# Patient Record
Sex: FEMALE | Race: OTHER | HISPANIC OR LATINO | URBAN - METROPOLITAN AREA
[De-identification: names, ages, dates, MRNs, and addresses within clinical notes are randomized per-mention and may not be internally consistent; named-entity substitution may affect disease eponyms.]

---

## 2017-06-01 ENCOUNTER — EMERGENCY (EMERGENCY)
Facility: HOSPITAL | Age: 82
LOS: 1 days | Discharge: HOME | End: 2017-06-01
Attending: EMERGENCY MEDICINE
Payer: MEDICARE

## 2017-06-01 VITALS
HEART RATE: 70 BPM | TEMPERATURE: 99 F | OXYGEN SATURATION: 98 % | WEIGHT: 130.07 LBS | DIASTOLIC BLOOD PRESSURE: 55 MMHG | SYSTOLIC BLOOD PRESSURE: 128 MMHG | HEIGHT: 61 IN | RESPIRATION RATE: 16 BRPM

## 2017-06-01 DIAGNOSIS — Z98.890 OTHER SPECIFIED POSTPROCEDURAL STATES: Chronic | ICD-10-CM

## 2017-06-01 LAB
ALBUMIN SERPL ELPH-MCNC: 3.7 G/DL — SIGNIFICANT CHANGE UP (ref 3.5–5)
ALP SERPL-CCNC: 62 U/L — SIGNIFICANT CHANGE UP (ref 40–120)
ALT FLD-CCNC: 32 U/L DA — SIGNIFICANT CHANGE UP (ref 10–60)
ANION GAP SERPL CALC-SCNC: 3 MMOL/L — LOW (ref 5–17)
APPEARANCE UR: CLEAR — SIGNIFICANT CHANGE UP
AST SERPL-CCNC: 25 U/L — SIGNIFICANT CHANGE UP (ref 10–40)
BASOPHILS # BLD AUTO: 0.1 K/UL — SIGNIFICANT CHANGE UP (ref 0–0.2)
BASOPHILS NFR BLD AUTO: 2 % — SIGNIFICANT CHANGE UP (ref 0–2)
BILIRUB SERPL-MCNC: 1 MG/DL — SIGNIFICANT CHANGE UP (ref 0.2–1.2)
BILIRUB UR-MCNC: NEGATIVE — SIGNIFICANT CHANGE UP
BUN SERPL-MCNC: 9 MG/DL — SIGNIFICANT CHANGE UP (ref 7–18)
CALCIUM SERPL-MCNC: 9.5 MG/DL — SIGNIFICANT CHANGE UP (ref 8.4–10.5)
CHLORIDE SERPL-SCNC: 105 MMOL/L — SIGNIFICANT CHANGE UP (ref 96–108)
CO2 SERPL-SCNC: 35 MMOL/L — HIGH (ref 22–31)
COLOR SPEC: YELLOW — SIGNIFICANT CHANGE UP
CREAT SERPL-MCNC: 0.69 MG/DL — SIGNIFICANT CHANGE UP (ref 0.5–1.3)
DIFF PNL FLD: ABNORMAL
EOSINOPHIL # BLD AUTO: 0.2 K/UL — SIGNIFICANT CHANGE UP (ref 0–0.5)
EOSINOPHIL NFR BLD AUTO: 3.2 % — SIGNIFICANT CHANGE UP (ref 0–6)
GLUCOSE SERPL-MCNC: 80 MG/DL — SIGNIFICANT CHANGE UP (ref 70–99)
GLUCOSE UR QL: NEGATIVE — SIGNIFICANT CHANGE UP
HCT VFR BLD CALC: 41.7 % — SIGNIFICANT CHANGE UP (ref 34.5–45)
HGB BLD-MCNC: 12.9 G/DL — SIGNIFICANT CHANGE UP (ref 11.5–15.5)
KETONES UR-MCNC: NEGATIVE — SIGNIFICANT CHANGE UP
LEUKOCYTE ESTERASE UR-ACNC: NEGATIVE — SIGNIFICANT CHANGE UP
LIDOCAIN IGE QN: 188 U/L — SIGNIFICANT CHANGE UP (ref 73–393)
LYMPHOCYTES # BLD AUTO: 2.6 K/UL — SIGNIFICANT CHANGE UP (ref 1–3.3)
LYMPHOCYTES # BLD AUTO: 43.5 % — SIGNIFICANT CHANGE UP (ref 13–44)
MCHC RBC-ENTMCNC: 26.8 PG — LOW (ref 27–34)
MCHC RBC-ENTMCNC: 31 GM/DL — LOW (ref 32–36)
MCV RBC AUTO: 86.4 FL — SIGNIFICANT CHANGE UP (ref 80–100)
MONOCYTES # BLD AUTO: 0.5 K/UL — SIGNIFICANT CHANGE UP (ref 0–0.9)
MONOCYTES NFR BLD AUTO: 8.4 % — SIGNIFICANT CHANGE UP (ref 2–14)
NEUTROPHILS # BLD AUTO: 2.5 K/UL — SIGNIFICANT CHANGE UP (ref 1.8–7.4)
NEUTROPHILS NFR BLD AUTO: 42.9 % — LOW (ref 43–77)
NITRITE UR-MCNC: NEGATIVE — SIGNIFICANT CHANGE UP
PH UR: 7 — SIGNIFICANT CHANGE UP (ref 5–8)
PLATELET # BLD AUTO: 239 K/UL — SIGNIFICANT CHANGE UP (ref 150–400)
POTASSIUM SERPL-MCNC: 4.5 MMOL/L — SIGNIFICANT CHANGE UP (ref 3.5–5.3)
POTASSIUM SERPL-SCNC: 4.5 MMOL/L — SIGNIFICANT CHANGE UP (ref 3.5–5.3)
PROT SERPL-MCNC: 8.2 G/DL — SIGNIFICANT CHANGE UP (ref 6–8.3)
PROT UR-MCNC: NEGATIVE — SIGNIFICANT CHANGE UP
RBC # BLD: 4.83 M/UL — SIGNIFICANT CHANGE UP (ref 3.8–5.2)
RBC # FLD: 13.6 % — SIGNIFICANT CHANGE UP (ref 10.3–14.5)
SODIUM SERPL-SCNC: 143 MMOL/L — SIGNIFICANT CHANGE UP (ref 135–145)
SP GR SPEC: 1 — LOW (ref 1.01–1.02)
UROBILINOGEN FLD QL: NEGATIVE — SIGNIFICANT CHANGE UP
WBC # BLD: 5.9 K/UL — SIGNIFICANT CHANGE UP (ref 3.8–10.5)
WBC # FLD AUTO: 5.9 K/UL — SIGNIFICANT CHANGE UP (ref 3.8–10.5)

## 2017-06-01 PROCEDURE — 87186 SC STD MICRODIL/AGAR DIL: CPT

## 2017-06-01 PROCEDURE — 80053 COMPREHEN METABOLIC PANEL: CPT

## 2017-06-01 PROCEDURE — 74177 CT ABD & PELVIS W/CONTRAST: CPT | Mod: 26

## 2017-06-01 PROCEDURE — 83690 ASSAY OF LIPASE: CPT

## 2017-06-01 PROCEDURE — 85027 COMPLETE CBC AUTOMATED: CPT

## 2017-06-01 PROCEDURE — 87086 URINE CULTURE/COLONY COUNT: CPT

## 2017-06-01 PROCEDURE — 74177 CT ABD & PELVIS W/CONTRAST: CPT

## 2017-06-01 PROCEDURE — 99285 EMERGENCY DEPT VISIT HI MDM: CPT

## 2017-06-01 PROCEDURE — 99284 EMERGENCY DEPT VISIT MOD MDM: CPT | Mod: 25

## 2017-06-01 PROCEDURE — 81001 URINALYSIS AUTO W/SCOPE: CPT

## 2017-06-01 RX ORDER — SODIUM CHLORIDE 9 MG/ML
1000 INJECTION INTRAMUSCULAR; INTRAVENOUS; SUBCUTANEOUS ONCE
Qty: 0 | Refills: 0 | Status: COMPLETED | OUTPATIENT
Start: 2017-06-01 | End: 2017-06-01

## 2017-06-01 RX ADMIN — SODIUM CHLORIDE 1000 MILLILITER(S): 9 INJECTION INTRAMUSCULAR; INTRAVENOUS; SUBCUTANEOUS at 20:26

## 2017-06-01 NOTE — ED ADULT TRIAGE NOTE - CHIEF COMPLAINT QUOTE
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 Surgical eval Dr Mcbride/ [place] per paperwork with pt.

## 2017-06-01 NOTE — ED PROVIDER NOTE - CHPI ED SYMPTOMS NEG
no diarrhea/no chills/no nausea/no fever/no vomiting/no SOB, no cough, no palpitations, no chest pain

## 2017-06-01 NOTE — ED PROVIDER NOTE - OBJECTIVE STATEMENT
81 y/o F pt with no significant PMHx, and PSHx of H/O hernia repair (12/2016), presents to ED c/o diffuse abd pain, vaginal spotting and dysuria x yesterday. Pt notes abd pain has resolved today. Pt reports visiting the OBGYN to day and was referred to the ED for further evaluation. Pt denies fever, chills, nausea, vomiting, diarrhea, SOB, cough, CP, palpitations, or any other complaints. NKDA. Nai (daughter)at bedside translating for pt in Divehi. 81 y/o F pt with no significant PMHx, and PSHx of H/O hernia repair (12/2016), presents to ED c/o diffuse abd pain, vaginal spotting and dysuria x yesterday. Pt notes abd pain has resolved today. Pt reports visiting the OBGYN to day and was referred to the ED for further evaluation. Pt denies fever, chills, nausea, vomiting, diarrhea, SOB, cough, CP, palpitations, or any other complaints. NKDA. Nai (daughter)at bedside translating for pt in Greenlandic, offered translational services but pt declined. 83 y/o F pt with no significant PMHx, and PSHx of H/O hernia repair (12/2016), presents to ED c/o diffuse abd pain, vaginal spotting and dysuria x yesterday. Pt notes abd pain has resolved today. Pt reports visiting the OBGYN today and was referred to the ED for further evaluation. Pt denies fever, chills, nausea, vomiting, diarrhea, SOB, cough, CP, palpitations, or any other complaints. NKDA.

## 2017-06-01 NOTE — ED ADULT NURSE NOTE - OBJECTIVE STATEMENT
Patient complains of abdominal pain, dysuria, and vaginal spotting x yesterday. Denies fever, chills, nausea, vomiting, shortness of breath.

## 2017-06-01 NOTE — ED ADULT NURSE REASSESSMENT NOTE - NS ED NURSE REASSESS COMMENT FT1
CT scan completed. Patient remains alert, responsive, resting in stretcher, moving all extremities. Family at bedside.

## 2017-06-01 NOTE — ED ADULT NURSE NOTE - ED STAT RN HANDOFF DETAILS
Patient endorsed to RN Tova, alert, responsive, resting in stretcher. Awaiting inpatient bed. Patient endorsed to RN Tova, alert, responsive, resting in stretcher. Awaiting disposition.

## 2017-06-01 NOTE — ED PROVIDER NOTE - PROGRESS NOTE DETAILS
CT negative for any acute sx proscess. Discussed w Kimberly VILLALOBOS PA, pt can return to Dr. Quiroz for TV/pelvic sono

## 2017-06-01 NOTE — ED PROVIDER NOTE - MEDICAL DECISION MAKING DETAILS
82 F pt with abd pain and vaginal spotting. Will do labs, UA, CT abd/pelvis, and pain control (pt declined).

## 2017-06-01 NOTE — ED PROVIDER NOTE - SKIN, MLM
Skin normal color for race, warm, dry and intact. 10 x 4 cm well healing scar on the medial distal leg from skin grafting

## 2017-06-03 DIAGNOSIS — R10.9 UNSPECIFIED ABDOMINAL PAIN: ICD-10-CM

## 2019-08-28 ENCOUNTER — INPATIENT (INPATIENT)
Facility: HOSPITAL | Age: 84
LOS: 1 days | Discharge: HOME | DRG: 871 | End: 2019-08-30
Attending: INTERNAL MEDICINE | Admitting: INTERNAL MEDICINE
Payer: MEDICARE

## 2019-08-28 VITALS
HEART RATE: 89 BPM | SYSTOLIC BLOOD PRESSURE: 144 MMHG | DIASTOLIC BLOOD PRESSURE: 67 MMHG | RESPIRATION RATE: 22 BRPM | HEIGHT: 60 IN | WEIGHT: 126.99 LBS | OXYGEN SATURATION: 93 % | TEMPERATURE: 103 F

## 2019-08-28 DIAGNOSIS — Z98.890 OTHER SPECIFIED POSTPROCEDURAL STATES: Chronic | ICD-10-CM

## 2019-08-28 DIAGNOSIS — R10.13 EPIGASTRIC PAIN: ICD-10-CM

## 2019-08-28 DIAGNOSIS — J18.9 PNEUMONIA, UNSPECIFIED ORGANISM: ICD-10-CM

## 2019-08-28 DIAGNOSIS — Z29.9 ENCOUNTER FOR PROPHYLACTIC MEASURES, UNSPECIFIED: ICD-10-CM

## 2019-08-28 DIAGNOSIS — R74.8 ABNORMAL LEVELS OF OTHER SERUM ENZYMES: ICD-10-CM

## 2019-08-28 DIAGNOSIS — A41.9 SEPSIS, UNSPECIFIED ORGANISM: ICD-10-CM

## 2019-08-28 LAB
24R-OH-CALCIDIOL SERPL-MCNC: 26.6 NG/ML — LOW (ref 30–80)
ALBUMIN SERPL ELPH-MCNC: 2.8 G/DL — LOW (ref 3.5–5)
ALBUMIN SERPL ELPH-MCNC: 3.6 G/DL — SIGNIFICANT CHANGE UP (ref 3.5–5)
ALP SERPL-CCNC: 56 U/L — SIGNIFICANT CHANGE UP (ref 40–120)
ALP SERPL-CCNC: 67 U/L — SIGNIFICANT CHANGE UP (ref 40–120)
ALT FLD-CCNC: 27 U/L DA — SIGNIFICANT CHANGE UP (ref 10–60)
ALT FLD-CCNC: 30 U/L DA — SIGNIFICANT CHANGE UP (ref 10–60)
ANION GAP SERPL CALC-SCNC: 4 MMOL/L — LOW (ref 5–17)
ANION GAP SERPL CALC-SCNC: 6 MMOL/L — SIGNIFICANT CHANGE UP (ref 5–17)
APPEARANCE UR: CLEAR — SIGNIFICANT CHANGE UP
APTT BLD: 25.2 SEC — LOW (ref 27.5–36.3)
AST SERPL-CCNC: 23 U/L — SIGNIFICANT CHANGE UP (ref 10–40)
AST SERPL-CCNC: 37 U/L — SIGNIFICANT CHANGE UP (ref 10–40)
BASOPHILS # BLD AUTO: 0.02 K/UL — SIGNIFICANT CHANGE UP (ref 0–0.2)
BASOPHILS NFR BLD AUTO: 0.3 % — SIGNIFICANT CHANGE UP (ref 0–2)
BILIRUB SERPL-MCNC: 1.6 MG/DL — HIGH (ref 0.2–1.2)
BILIRUB SERPL-MCNC: 2 MG/DL — HIGH (ref 0.2–1.2)
BILIRUB UR-MCNC: NEGATIVE — SIGNIFICANT CHANGE UP
BUN SERPL-MCNC: 14 MG/DL — SIGNIFICANT CHANGE UP (ref 7–18)
BUN SERPL-MCNC: 9 MG/DL — SIGNIFICANT CHANGE UP (ref 7–18)
CALCIUM SERPL-MCNC: 7.5 MG/DL — LOW (ref 8.4–10.5)
CALCIUM SERPL-MCNC: 8.6 MG/DL — SIGNIFICANT CHANGE UP (ref 8.4–10.5)
CHLORIDE SERPL-SCNC: 102 MMOL/L — SIGNIFICANT CHANGE UP (ref 96–108)
CHLORIDE SERPL-SCNC: 109 MMOL/L — HIGH (ref 96–108)
CHOLEST SERPL-MCNC: 124 MG/DL — SIGNIFICANT CHANGE UP (ref 10–199)
CK MB BLD-MCNC: 3.6 % — HIGH (ref 0–3.5)
CK MB BLD-MCNC: 4.3 % — HIGH (ref 0–3.5)
CK MB CFR SERPL CALC: 7.1 NG/ML — HIGH (ref 0–3.6)
CK MB CFR SERPL CALC: 9.3 NG/ML — HIGH (ref 0–3.6)
CK SERPL-CCNC: 196 U/L — SIGNIFICANT CHANGE UP (ref 21–215)
CK SERPL-CCNC: 217 U/L — HIGH (ref 21–215)
CO2 SERPL-SCNC: 27 MMOL/L — SIGNIFICANT CHANGE UP (ref 22–31)
CO2 SERPL-SCNC: 30 MMOL/L — SIGNIFICANT CHANGE UP (ref 22–31)
COLOR SPEC: YELLOW — SIGNIFICANT CHANGE UP
CREAT SERPL-MCNC: 0.68 MG/DL — SIGNIFICANT CHANGE UP (ref 0.5–1.3)
CREAT SERPL-MCNC: 0.83 MG/DL — SIGNIFICANT CHANGE UP (ref 0.5–1.3)
DIFF PNL FLD: ABNORMAL
EOSINOPHIL # BLD AUTO: 0.04 K/UL — SIGNIFICANT CHANGE UP (ref 0–0.5)
EOSINOPHIL NFR BLD AUTO: 0.6 % — SIGNIFICANT CHANGE UP (ref 0–6)
GLUCOSE SERPL-MCNC: 110 MG/DL — HIGH (ref 70–99)
GLUCOSE SERPL-MCNC: 125 MG/DL — HIGH (ref 70–99)
GLUCOSE UR QL: NEGATIVE — SIGNIFICANT CHANGE UP
HBA1C BLD-MCNC: 5.7 % — HIGH (ref 4–5.6)
HCT VFR BLD CALC: 33.9 % — LOW (ref 34.5–45)
HCT VFR BLD CALC: 38.4 % — SIGNIFICANT CHANGE UP (ref 34.5–45)
HDLC SERPL-MCNC: 66 MG/DL — SIGNIFICANT CHANGE UP
HGB BLD-MCNC: 10.7 G/DL — LOW (ref 11.5–15.5)
HGB BLD-MCNC: 12.2 G/DL — SIGNIFICANT CHANGE UP (ref 11.5–15.5)
IMM GRANULOCYTES NFR BLD AUTO: 0.3 % — SIGNIFICANT CHANGE UP (ref 0–1.5)
INR BLD: 1.37 RATIO — HIGH (ref 0.88–1.16)
KETONES UR-MCNC: NEGATIVE — SIGNIFICANT CHANGE UP
LACTATE SERPL-SCNC: 1.1 MMOL/L — SIGNIFICANT CHANGE UP (ref 0.7–2)
LEUKOCYTE ESTERASE UR-ACNC: ABNORMAL
LIPID PNL WITH DIRECT LDL SERPL: 50 MG/DL — SIGNIFICANT CHANGE UP
LYMPHOCYTES # BLD AUTO: 0.63 K/UL — LOW (ref 1–3.3)
LYMPHOCYTES # BLD AUTO: 10.2 % — LOW (ref 13–44)
MAGNESIUM SERPL-MCNC: 1.9 MG/DL — SIGNIFICANT CHANGE UP (ref 1.6–2.6)
MCHC RBC-ENTMCNC: 26.8 PG — LOW (ref 27–34)
MCHC RBC-ENTMCNC: 26.9 PG — LOW (ref 27–34)
MCHC RBC-ENTMCNC: 31.6 GM/DL — LOW (ref 32–36)
MCHC RBC-ENTMCNC: 31.8 GM/DL — LOW (ref 32–36)
MCV RBC AUTO: 84.8 FL — SIGNIFICANT CHANGE UP (ref 80–100)
MCV RBC AUTO: 84.8 FL — SIGNIFICANT CHANGE UP (ref 80–100)
MONOCYTES # BLD AUTO: 0.04 K/UL — SIGNIFICANT CHANGE UP (ref 0–0.9)
MONOCYTES NFR BLD AUTO: 0.6 % — LOW (ref 2–14)
NEUTROPHILS # BLD AUTO: 5.42 K/UL — SIGNIFICANT CHANGE UP (ref 1.8–7.4)
NEUTROPHILS NFR BLD AUTO: 88 % — HIGH (ref 43–77)
NITRITE UR-MCNC: NEGATIVE — SIGNIFICANT CHANGE UP
NRBC # BLD: 0 /100 WBCS — SIGNIFICANT CHANGE UP (ref 0–0)
NRBC # BLD: 0 /100 WBCS — SIGNIFICANT CHANGE UP (ref 0–0)
NT-PROBNP SERPL-SCNC: 88 PG/ML — SIGNIFICANT CHANGE UP (ref 0–450)
PH UR: 7 — SIGNIFICANT CHANGE UP (ref 5–8)
PHOSPHATE SERPL-MCNC: 3 MG/DL — SIGNIFICANT CHANGE UP (ref 2.5–4.5)
PLATELET # BLD AUTO: 164 K/UL — SIGNIFICANT CHANGE UP (ref 150–400)
PLATELET # BLD AUTO: 193 K/UL — SIGNIFICANT CHANGE UP (ref 150–400)
POTASSIUM SERPL-MCNC: 3.6 MMOL/L — SIGNIFICANT CHANGE UP (ref 3.5–5.3)
POTASSIUM SERPL-MCNC: 3.8 MMOL/L — SIGNIFICANT CHANGE UP (ref 3.5–5.3)
POTASSIUM SERPL-SCNC: 3.6 MMOL/L — SIGNIFICANT CHANGE UP (ref 3.5–5.3)
POTASSIUM SERPL-SCNC: 3.8 MMOL/L — SIGNIFICANT CHANGE UP (ref 3.5–5.3)
PROT SERPL-MCNC: 6.3 G/DL — SIGNIFICANT CHANGE UP (ref 6–8.3)
PROT SERPL-MCNC: 7.8 G/DL — SIGNIFICANT CHANGE UP (ref 6–8.3)
PROT UR-MCNC: 30 MG/DL
PROTHROM AB SERPL-ACNC: 15.3 SEC — HIGH (ref 10–12.9)
RBC # BLD: 4 M/UL — SIGNIFICANT CHANGE UP (ref 3.8–5.2)
RBC # BLD: 4.53 M/UL — SIGNIFICANT CHANGE UP (ref 3.8–5.2)
RBC # FLD: 14.7 % — HIGH (ref 10.3–14.5)
RBC # FLD: 15 % — HIGH (ref 10.3–14.5)
SODIUM SERPL-SCNC: 138 MMOL/L — SIGNIFICANT CHANGE UP (ref 135–145)
SODIUM SERPL-SCNC: 140 MMOL/L — SIGNIFICANT CHANGE UP (ref 135–145)
SP GR SPEC: 1 — LOW (ref 1.01–1.02)
TOTAL CHOLESTEROL/HDL RATIO MEASUREMENT: 1.9 RATIO — LOW (ref 3.3–7.1)
TRIGL SERPL-MCNC: 42 MG/DL — SIGNIFICANT CHANGE UP (ref 10–149)
TROPONIN I SERPL-MCNC: 0.13 NG/ML — HIGH (ref 0–0.04)
TROPONIN I SERPL-MCNC: 4.18 NG/ML — HIGH (ref 0–0.04)
TROPONIN I SERPL-MCNC: 4.53 NG/ML — HIGH (ref 0–0.04)
TROPONIN I SERPL-MCNC: 5.89 NG/ML — HIGH (ref 0–0.04)
TSH SERPL-MCNC: 0.31 UU/ML — LOW (ref 0.34–4.82)
UROBILINOGEN FLD QL: NEGATIVE — SIGNIFICANT CHANGE UP
VIT B12 SERPL-MCNC: 421 PG/ML — SIGNIFICANT CHANGE UP (ref 232–1245)
WBC # BLD: 6.17 K/UL — SIGNIFICANT CHANGE UP (ref 3.8–10.5)
WBC # BLD: 7.24 K/UL — SIGNIFICANT CHANGE UP (ref 3.8–10.5)
WBC # FLD AUTO: 6.17 K/UL — SIGNIFICANT CHANGE UP (ref 3.8–10.5)
WBC # FLD AUTO: 7.24 K/UL — SIGNIFICANT CHANGE UP (ref 3.8–10.5)

## 2019-08-28 PROCEDURE — 93010 ELECTROCARDIOGRAM REPORT: CPT | Mod: 76

## 2019-08-28 PROCEDURE — 99285 EMERGENCY DEPT VISIT HI MDM: CPT

## 2019-08-28 PROCEDURE — 71275 CT ANGIOGRAPHY CHEST: CPT | Mod: 26

## 2019-08-28 PROCEDURE — 99223 1ST HOSP IP/OBS HIGH 75: CPT

## 2019-08-28 PROCEDURE — 71045 X-RAY EXAM CHEST 1 VIEW: CPT | Mod: 26

## 2019-08-28 PROCEDURE — 99232 SBSQ HOSP IP/OBS MODERATE 35: CPT

## 2019-08-28 RX ORDER — ASPIRIN/CALCIUM CARB/MAGNESIUM 324 MG
81 TABLET ORAL DAILY
Refills: 0 | Status: DISCONTINUED | OUTPATIENT
Start: 2019-08-28 | End: 2019-08-30

## 2019-08-28 RX ORDER — AZITHROMYCIN 500 MG/1
500 TABLET, FILM COATED ORAL EVERY 24 HOURS
Refills: 0 | Status: DISCONTINUED | OUTPATIENT
Start: 2019-08-29 | End: 2019-08-30

## 2019-08-28 RX ORDER — SODIUM CHLORIDE 9 MG/ML
1000 INJECTION INTRAMUSCULAR; INTRAVENOUS; SUBCUTANEOUS ONCE
Refills: 0 | Status: COMPLETED | OUTPATIENT
Start: 2019-08-28 | End: 2019-08-28

## 2019-08-28 RX ORDER — SODIUM CHLORIDE 9 MG/ML
1000 INJECTION INTRAMUSCULAR; INTRAVENOUS; SUBCUTANEOUS
Refills: 0 | Status: DISCONTINUED | OUTPATIENT
Start: 2019-08-28 | End: 2019-08-30

## 2019-08-28 RX ORDER — ATORVASTATIN CALCIUM 80 MG/1
40 TABLET, FILM COATED ORAL AT BEDTIME
Refills: 0 | Status: DISCONTINUED | OUTPATIENT
Start: 2019-08-28 | End: 2019-08-30

## 2019-08-28 RX ORDER — KETOROLAC TROMETHAMINE 30 MG/ML
15 SYRINGE (ML) INJECTION ONCE
Refills: 0 | Status: DISCONTINUED | OUTPATIENT
Start: 2019-08-28 | End: 2019-08-28

## 2019-08-28 RX ORDER — HEPARIN SODIUM 5000 [USP'U]/ML
5000 INJECTION INTRAVENOUS; SUBCUTANEOUS EVERY 12 HOURS
Refills: 0 | Status: DISCONTINUED | OUTPATIENT
Start: 2019-08-28 | End: 2019-08-30

## 2019-08-28 RX ORDER — ACETAMINOPHEN 500 MG
1000 TABLET ORAL ONCE
Refills: 0 | Status: COMPLETED | OUTPATIENT
Start: 2019-08-28 | End: 2019-08-28

## 2019-08-28 RX ORDER — CEFTRIAXONE 500 MG/1
1000 INJECTION, POWDER, FOR SOLUTION INTRAMUSCULAR; INTRAVENOUS EVERY 24 HOURS
Refills: 0 | Status: DISCONTINUED | OUTPATIENT
Start: 2019-08-29 | End: 2019-08-30

## 2019-08-28 RX ORDER — CEFTRIAXONE 500 MG/1
1000 INJECTION, POWDER, FOR SOLUTION INTRAMUSCULAR; INTRAVENOUS ONCE
Refills: 0 | Status: COMPLETED | OUTPATIENT
Start: 2019-08-28 | End: 2019-08-28

## 2019-08-28 RX ORDER — ASPIRIN/CALCIUM CARB/MAGNESIUM 324 MG
325 TABLET ORAL ONCE
Refills: 0 | Status: COMPLETED | OUTPATIENT
Start: 2019-08-28 | End: 2019-08-28

## 2019-08-28 RX ORDER — PANTOPRAZOLE SODIUM 20 MG/1
40 TABLET, DELAYED RELEASE ORAL
Refills: 0 | Status: DISCONTINUED | OUTPATIENT
Start: 2019-08-28 | End: 2019-08-30

## 2019-08-28 RX ORDER — AZITHROMYCIN 500 MG/1
500 TABLET, FILM COATED ORAL ONCE
Refills: 0 | Status: COMPLETED | OUTPATIENT
Start: 2019-08-28 | End: 2019-08-28

## 2019-08-28 RX ORDER — SODIUM CHLORIDE 9 MG/ML
1800 INJECTION INTRAMUSCULAR; INTRAVENOUS; SUBCUTANEOUS ONCE
Refills: 0 | Status: COMPLETED | OUTPATIENT
Start: 2019-08-28 | End: 2019-08-28

## 2019-08-28 RX ORDER — MORPHINE SULFATE 50 MG/1
1 CAPSULE, EXTENDED RELEASE ORAL ONCE
Refills: 0 | Status: DISCONTINUED | OUTPATIENT
Start: 2019-08-28 | End: 2019-08-28

## 2019-08-28 RX ORDER — MORPHINE SULFATE 50 MG/1
2 CAPSULE, EXTENDED RELEASE ORAL ONCE
Refills: 0 | Status: DISCONTINUED | OUTPATIENT
Start: 2019-08-28 | End: 2019-08-28

## 2019-08-28 RX ADMIN — Medication 30 MILLILITER(S): at 22:25

## 2019-08-28 RX ADMIN — SODIUM CHLORIDE 75 MILLILITER(S): 9 INJECTION INTRAMUSCULAR; INTRAVENOUS; SUBCUTANEOUS at 17:57

## 2019-08-28 RX ADMIN — Medication 325 MILLIGRAM(S): at 07:52

## 2019-08-28 RX ADMIN — CEFTRIAXONE 1000 MILLIGRAM(S): 500 INJECTION, POWDER, FOR SOLUTION INTRAMUSCULAR; INTRAVENOUS at 03:46

## 2019-08-28 RX ADMIN — Medication 1000 MILLIGRAM(S): at 05:03

## 2019-08-28 RX ADMIN — MORPHINE SULFATE 2 MILLIGRAM(S): 50 CAPSULE, EXTENDED RELEASE ORAL at 07:00

## 2019-08-28 RX ADMIN — Medication 400 MILLIGRAM(S): at 01:55

## 2019-08-28 RX ADMIN — MORPHINE SULFATE 1 MILLIGRAM(S): 50 CAPSULE, EXTENDED RELEASE ORAL at 23:23

## 2019-08-28 RX ADMIN — PANTOPRAZOLE SODIUM 40 MILLIGRAM(S): 20 TABLET, DELAYED RELEASE ORAL at 07:52

## 2019-08-28 RX ADMIN — SODIUM CHLORIDE 1000 MILLILITER(S): 9 INJECTION INTRAMUSCULAR; INTRAVENOUS; SUBCUTANEOUS at 06:59

## 2019-08-28 RX ADMIN — SODIUM CHLORIDE 75 MILLILITER(S): 9 INJECTION INTRAMUSCULAR; INTRAVENOUS; SUBCUTANEOUS at 07:26

## 2019-08-28 RX ADMIN — Medication 81 MILLIGRAM(S): at 12:00

## 2019-08-28 RX ADMIN — CEFTRIAXONE 100 MILLIGRAM(S): 500 INJECTION, POWDER, FOR SOLUTION INTRAMUSCULAR; INTRAVENOUS at 03:10

## 2019-08-28 RX ADMIN — SODIUM CHLORIDE 1800 MILLILITER(S): 9 INJECTION INTRAMUSCULAR; INTRAVENOUS; SUBCUTANEOUS at 01:54

## 2019-08-28 RX ADMIN — Medication 15 MILLIGRAM(S): at 03:58

## 2019-08-28 RX ADMIN — AZITHROMYCIN 500 MILLIGRAM(S): 500 TABLET, FILM COATED ORAL at 03:10

## 2019-08-28 RX ADMIN — Medication 15 MILLIGRAM(S): at 05:03

## 2019-08-28 RX ADMIN — HEPARIN SODIUM 5000 UNIT(S): 5000 INJECTION INTRAVENOUS; SUBCUTANEOUS at 17:56

## 2019-08-28 RX ADMIN — MORPHINE SULFATE 2 MILLIGRAM(S): 50 CAPSULE, EXTENDED RELEASE ORAL at 05:08

## 2019-08-28 NOTE — H&P ADULT - PROBLEM SELECTOR PLAN 3
could be from atypical presentation of NSTEMI or gastritis   pt takes ibuprofen at home but only takes if needed  will start pantoprazole  pt denies dysphagia or odynophagia, hemoptysis or hematemesis

## 2019-08-28 NOTE — H&P ADULT - PROBLEM SELECTOR PLAN 1
pt came in with fever of 103, RR 22 meets sirs criteria  CT angio chest neg for consolidation, PE  U/A only positive for bacteria, no wbc count, pt does not have urinary symptoms   since pt has cough will start empiric rocephin and zithromax  f/u blood and urine culture   s/p 2.8 L ivf c/w ns at 75 cc/hr

## 2019-08-28 NOTE — H&P ADULT - PROBLEM SELECTOR PLAN 2
pt is having epigastric pain, improves with rubbing- atypical pain   ekg NSR, trop elevated 0.131 will trend it down   will give full dose apisin and start lipitor will hold bb since bp is running on lower side  echocardiogram, lipid panel, hba1c  tele monitoring  cardiology evaluation

## 2019-08-28 NOTE — H&P ADULT - HISTORY OF PRESENT ILLNESS
Patient is 83 year old female has past medical hx significant for celiac disease was brought in by daughter after pt was shaking last night.     Per daughter pt was in her usual health until last night when she woke up at midnight pt started to have shaking shivering pt was brought in to ED pt was code sepsis for fever of 103, pt had CT angio chest which was neg for PE, pt was given Rocephin and Zithromax for concern for pneumonia.   pt is very poor historian complaining of dry cough for a week, epigastric pain for several days, severe pain, non radiating, unable to provide much information, per daughter they were told pt has acid reflux, pt is having dizziness and upper back pain. Pt denies headache, sob, palpitation, abdominal pain, n/v/d, hematuria, dysuria hemoptysis, rash.

## 2019-08-28 NOTE — PROGRESS NOTE ADULT - PROBLEM SELECTOR PLAN 2
pt is having epigastric pain, improves with rubbing- atypical pain   ekg NSR, trop elevated 0.131 will trend it down   will give full dose apisin and start lipitor will hold bb since bp is running on lower side  echocardiogram, lipid panel, hba1c  tele monitoring  cardiology evaluation pt is having epigastric pain, improves with rubbing- atypical pain   ekg NSR, trop elevated 0.131 will trend it down 0: T2: 4.5-->T3:5.8   full dose apisin was given, lipitor started,  will hold bb since bp is running on lower side  echocardiogram, lipid panel : LDL:50, hba1c: 5.7  tele monitoring  cardiology evaluation pt is having epigastric pain, improves with rubbing- atypical pain   ekg NSR, trop elevated 0.131 will trend it down 0: T2: 4.5-->T3:5.8   full dose apisin was given, lipitor started,  will hold bb since bp is running on lower side  echocardiogram, lipid panel : LDL:50, hba1c: 5.7  tele monitoring  cardiology evaluation  repeat ECG in AM 8/28 : Invrt Ts, Tn:4.5  repeat ECG in PM 8/28 : Invrt Ts. Tn:5.8  will perform NST AM

## 2019-08-28 NOTE — ED PROVIDER NOTE - CARE PLAN
Principal Discharge DX:	PNA (pneumonia)  Secondary Diagnosis:	Hypoxia Principal Discharge DX:	PNA (pneumonia)  Secondary Diagnosis:	Hypoxia  Secondary Diagnosis:	UTI (urinary tract infection)

## 2019-08-28 NOTE — H&P ADULT - NSHPPHYSICALEXAM_GEN_ALL_CORE
Vital Signs Last 24 Hrs  T(C): 37.6 (28 Aug 2019 06:00), Max: 39.7 (28 Aug 2019 01:40)  T(F): 99.6 (28 Aug 2019 06:00), Max: 103.4 (28 Aug 2019 01:40)  HR: 67 (28 Aug 2019 07:00) (67 - 89)  BP: 98/50 (28 Aug 2019 07:00) (98/49 - 144/67)  BP(mean): --  RR: 12 (28 Aug 2019 07:00) (12 - 22)  SpO2: 95% (28 Aug 2019 07:00) (91% - 98%)    PHYSICAL EXAM:  GENERAL: NAD, well-developed  HEAD:  Atraumatic, Normocephalic  EYES: EOMI, PERRLA, conjunctiva and sclera clear  NECK: Supple, No JVD  CHEST/LUNG: Clear to auscultation bilaterally; No wheeze  HEART: Regular rate and rhythm; s1+ s2+  ABDOMEN: Soft, Nontender, epigastric tenderness ; Bowel sounds present  EXTREMITIES:, No clubbing, cyanosis, or edema  NEUROLOGY:AAOx3 non-focal  SKIN: No rashes or lesions

## 2019-08-28 NOTE — PROGRESS NOTE ADULT - SUBJECTIVE AND OBJECTIVE BOX
Patient is a 84y old  Female who presents with a chief complaint of Sepsis/ epigastric pain (28 Aug 2019 09:22)      INTERVAL HPI/OVERNIGHT EVENTS:      REVIEW OF SYSTEMS:  CONSTITUTIONAL: No fever, weight loss, or fatigue  EYES: No eye pain, visual disturbances, or discharge  ENMT:  No difficulty hearing, tinnitus, vertigo; No sinus or throat pain  NECK: No pain or stiffness  BREASTS: No pain, masses, or nipple discharge  RESPIRATORY: No cough, wheezing, chills or hemoptysis; No shortness of breath  CARDIOVASCULAR: No chest pain, palpitations, dizziness, or leg swelling  GASTROINTESTINAL: No abdominal or epigastric pain. No nausea, vomiting, or hematemesis; No diarrhea or constipation. No melena or hematochezia.  GENITOURINARY: No dysuria, frequency, hematuria, or incontinence  NEUROLOGICAL: No headaches, memory loss, loss of strength, numbness, or tremors  SKIN: No itching, burning, rashes, or lesions   LYMPH NODES: No enlarged glands  ENDOCRINE: No heat or cold intolerance; No hair loss  MUSCULOSKELETAL: No joint pain or swelling; No muscle, back, or extremity pain  HEME/LYMPH: No easy bruising, or bleeding gums  ALLERY AND IMMUNOLOGIC: No hives or eczema    FAMILY HISTORY:    Vital Signs Last 24 Hrs  T(C): 37 (28 Aug 2019 11:16), Max: 39.7 (28 Aug 2019 01:40)  T(F): 98.6 (28 Aug 2019 11:16), Max: 103.4 (28 Aug 2019 01:40)  HR: 58 (28 Aug 2019 11:16) (58 - 89)  BP: 107/49 (28 Aug 2019 11:16) (90/47 - 144/67)  BP(mean): --  RR: 18 (28 Aug 2019 11:16) (12 - 22)  SpO2: 96% (28 Aug 2019 11:16) (91% - 98%)    08-28-19 @ 07:01  -  08-28-19 @ 12:12  --------------------------------------------------------  IN: 118 mL / OUT: 0 mL / NET: 118 mL        PHYSICAL EXAM:  GENERAL: NAD, well-groomed, well-developed  HEAD:  Atraumatic, Normocephalic  EYES: EOMI, PERRLA, conjunctiva and sclera clear  ENMT: No tonsillar erythema, exudates, or enlargement; Moist mucous membranes, Good dentition, No lesions  NECK: Supple, No JVD, Normal thyroid  NERVOUS SYSTEM:  Alert & Oriented X3, Good concentration; Motor Strength 5/5 B/L upper and lower extremities; DTRs 2+ intact and symmetric  CHEST/LUNG: Clear to percussion bilaterally; No rales, rhonchi, wheezing, or rubs  HEART: Regular rate and rhythm; No murmurs, rubs, or gallops  ABDOMEN: Soft, Nontender, Nondistended; Bowel sounds present  EXTREMITIES:  2+ Peripheral Pulses, No clubbing, cyanosis, or edema  LYMPH: No lymphadenopathy noted  SKIN: No rashes or lesions    Consultant(s) Notes Reviewed:  [x ] YES  [ ] NO  Care Discussed with Consultants/Other Providers [ x] YES  [ ] NO    LABS:        RADIOLOGY & ADDITIONAL TESTS:    Imaging Personally Reviewed:  [ ] YES  [ ] NO  aspirin enteric coated 81 milliGRAM(s) Oral daily  atorvastatin 40 milliGRAM(s) Oral at bedtime  heparin  Injectable 5000 Unit(s) SubCutaneous every 12 hours  pantoprazole    Tablet 40 milliGRAM(s) Oral before breakfast  sodium chloride 0.9%. 1000 milliLiter(s) IV Continuous <Continuous>      HEALTH ISSUES - PROBLEM Dx:  Prophylactic measure: Prophylactic measure  Epigastric abdominal pain: Epigastric abdominal pain  Elevated troponin: Elevated troponin  Sepsis: Sepsis Patient is a 84y old  Female who presents with a chief complaint of Sepsis/ epigastric pain (28 Aug 2019 09:22)      INTERVAL HPI/OVERNIGHT EVENTS: pt seen and examined, no overnight acute events, morning Chest pain has been resolved. no SOB         REVIEW OF SYSTEMS:  CONSTITUTIONAL: No fever, weight loss, or fatigue  EYES: No eye pain, visual disturbances, or discharge  ENMT:  No difficulty hearing, tinnitus, vertigo; No sinus or throat pain  NECK: No pain or stiffness  BREASTS: No pain, masses, or nipple discharge  RESPIRATORY: No cough, wheezing, chills or hemoptysis; No shortness of breath  CARDIOVASCULAR: No chest pain, palpitations, dizziness, or leg swelling  GASTROINTESTINAL: No abdominal or epigastric pain. No nausea, vomiting, or hematemesis; No diarrhea or constipation. No melena or hematochezia.  GENITOURINARY: No dysuria, frequency, hematuria, or incontinence  NEUROLOGICAL: No headaches, memory loss, loss of strength, numbness, or tremors  SKIN: No itching, burning, rashes, or lesions   LYMPH NODES: No enlarged glands  ENDOCRINE: No heat or cold intolerance; No hair loss  MUSCULOSKELETAL: No joint pain or swelling; No muscle, back, or extremity pain  HEME/LYMPH: No easy bruising, or bleeding gums  ALLERY AND IMMUNOLOGIC: No hives or eczema    FAMILY HISTORY:    Vital Signs Last 24 Hrs  T(C): 37 (28 Aug 2019 11:16), Max: 39.7 (28 Aug 2019 01:40)  T(F): 98.6 (28 Aug 2019 11:16), Max: 103.4 (28 Aug 2019 01:40)  HR: 58 (28 Aug 2019 11:16) (58 - 89)  BP: 107/49 (28 Aug 2019 11:16) (90/47 - 144/67)  BP(mean): --  RR: 18 (28 Aug 2019 11:16) (12 - 22)  SpO2: 96% (28 Aug 2019 11:16) (91% - 98%)    08-28-19 @ 07:01  -  08-28-19 @ 12:12  --------------------------------------------------------  IN: 118 mL / OUT: 0 mL / NET: 118 mL        PHYSICAL EXAM:  GENERAL: NAD, well-groomed, well-developed  HEAD:  Atraumatic, Normocephalic  EYES: EOMI, PERRLA, conjunctiva and sclera clear  ENMT: No tonsillar erythema, exudates, or enlargement; Moist mucous membranes, Good dentition, No lesions  NECK: Supple, No JVD, Normal thyroid  NERVOUS SYSTEM:  Alert & Oriented X3, Good concentration; Motor Strength 5/5 B/L upper and lower extremities; DTRs 2+ intact and symmetric  CHEST/LUNG: Clear to percussion bilaterally; No rales, rhonchi, wheezing, or rubs  HEART: Regular rate and rhythm; No murmurs, rubs, or gallops  ABDOMEN: Soft, Nontender, Nondistended; Bowel sounds present  EXTREMITIES:  2+ Peripheral Pulses, No clubbing, cyanosis, or edema  LYMPH: No lymphadenopathy noted  SKIN: No rashes or lesions    Consultant(s) Notes Reviewed:  [x ] YES  [ ] NO  Care Discussed with Consultants/Other Providers [ x] YES  [ ] NO    LABS:                        10.7   7.24  )-----------( 164      ( 28 Aug 2019 07:53 )             33.9   08-28    140  |  109<H>  |  9   ----------------------------<  110<H>  3.6   |  27  |  0.68    Ca    7.5<L>      28 Aug 2019 07:53  Phos  3.0     08-28  Mg     1.9     08-28    TPro  6.3  /  Alb  2.8<L>  /  TBili  1.6<H>  /  DBili  x   /  AST  37  /  ALT  30  /  AlkPhos  56  08-28          RADIOLOGY & ADDITIONAL TESTS:    Imaging Personally Reviewed:  [ ] YES  [ ] NO  aspirin enteric coated 81 milliGRAM(s) Oral daily  atorvastatin 40 milliGRAM(s) Oral at bedtime  heparin  Injectable 5000 Unit(s) SubCutaneous every 12 hours  pantoprazole    Tablet 40 milliGRAM(s) Oral before breakfast  sodium chloride 0.9%. 1000 milliLiter(s) IV Continuous <Continuous>      HEALTH ISSUES - PROBLEM Dx:  Prophylactic measure: Prophylactic measure  Epigastric abdominal pain: Epigastric abdominal pain  Elevated troponin: Elevated troponin  Sepsis: Sepsis

## 2019-08-28 NOTE — H&P ADULT - ATTENDING COMMENTS
Vital Signs Last 24 Hrs  T(C): 37.6 (28 Aug 2019 06:00), Max: 39.7 (28 Aug 2019 01:40)  T(F): 99.6 (28 Aug 2019 06:00), Max: 103.4 (28 Aug 2019 01:40)  HR: 73 (28 Aug 2019 06:00) (70 - 89)  BP: 100/48 (28 Aug 2019 06:00) (100/48 - 144/67)  BP(mean): --  RR: 12 (28 Aug 2019 06:00) (12 - 22)  SpO2: 95% (28 Aug 2019 06:00) (91% - 98%) Patient seen and examined with Medical resident - Dr Alonso  84 year old woman with no significant medical hx with presenting with 1 day of high grade fever with chills, SOB and non-productive cough. [facility] ROS only revealed abdominal pain in the epigastric region.  No vomiting or diarrhea  IN the ED, initial work up unremarkable.     Vital Signs Last 24 Hrs  T(C): 37.6 (28 Aug 2019 06:00), Max: 39.7 (28 Aug 2019 01:40)  T(F): 99.6 (28 Aug 2019 06:00), Max: 103.4 (28 Aug 2019 01:40)  HR: 73 (28 Aug 2019 06:00) (70 - 89)  BP: 100/48 (28 Aug 2019 06:00) (100/48 - 144/67)  RR: 12 (28 Aug 2019 06:00) (12 - 22)  SpO2: 95% (28 Aug 2019 06:00) (91% - 98%)    Elderly woman, lying in bed, NAD presently, AAO X 3 - [place] speaking   MMM, no pharyngeal erythema, no palpable LAD  CTA B/L, no added sounds  Soft, mild ot moderate TTP mostly in the epigastric region with mild ttp in the LUQ and periumbilical area  ND BS +. NO CVA tenderness  No pedal edema    Labs                        12.2   6.17  )-----------( 193      ( 28 Aug 2019 02:29 )             38.4     08-28    138  |  102  |  14  ----------------------<  125<H>  3.8   |  30  |  0.83    Ca    8.6      28 Aug 2019 02:29  TPro  7.8  /  Alb  3.6  /  TBili  2.0<H>  /  DBili  x   /  AST  23  /  ALT  27  /  AlkPhos  67  08-28    CARDIAC MARKERS ( 28 Aug 2019 02:29 )  0.131 ng/mL / x     / x     / x     / x        Urinalysis Basic - ( 28 Aug 2019 03:40 )  Color: Yellow / Appearance: Clear / S.005 / pH: x  Gluc: x / Ketone: Negative  / Bili: Negative / Urobili: Negative   Blood: x / Protein: 30 mg/dL / Nitrite: Negative   Leuk Esterase: Small / RBC: 5-10 /HPF / WBC 3-5 /HPF   Sq Epi: x / Non Sq Epi: Few /HPF / Bacteria: Moderate /HPF    CTA chest - unremarkable findings    Impression  - Fever/SOB and cough, sick contact with no chest imaging findings likely early pneumonia vs acute bronchitis  - epigastric pain/tenderness with mild cardiac enzymes elevation - r/o ACS vs gastritis  - Mildly elevated glucose level - check A1c    - A/P  Admit to telemetry  Serial trop and repeat EKG  Lipid panel and A1c   mg X 1 dose now  Continue IV antibiotics empirically - ceftriaxone and azithromycin  F/up pending cultures  GI cocktail and PPI  Will monitor closely.

## 2019-08-28 NOTE — ED ADULT NURSE NOTE - OBJECTIVE STATEMENT
brought in by EMS from NH with fever and chills.. sepsis protocol initiated.  NS IVPB  bolus given. urine specimen  obtained & sent to lab. meds given as  ordered. pending ct.scan

## 2019-08-28 NOTE — H&P ADULT - PROBLEM SELECTOR PLAN 4
IMPROVE VTE Individual Risk Assessment  RISK                                                         Points  [  ] Previous VTE                                      3  [  ] Thrombophilia                                   2  [  ] Lower limb paralysis                         2 (unable to hold up >15 seconds)    [  ] Current Cancer                                  2       (within 6 months)  [  ] Immobilization > 24 hrs                    1  [  ] ICU/CCU stay > 24 hrs                         1  [  x] Age > 60                                              1  IMPROVE VTE Score 1  will start heparin sq for dvt ppx

## 2019-08-28 NOTE — ED PROVIDER NOTE - CLINICAL SUMMARY MEDICAL DECISION MAKING FREE TEXT BOX
85 yo presents with fever and cough. Code sepsis was initiated on arrival to ED. Will obtain Sepsis labs, CXR. Patient given empiric antibiotics, IV fluids, and Tylenol. Will reassess. 83 yo presents with fever and cough. Code sepsis was initiated on arrival to ED. Will obtain Sepsis labs, CXR. Patient given empiric antibiotics, IV fluids, and Tylenol. Will reassess.    labs show wbc wnl, cmp unremarkable, UA shows small LE/blood and moderate bacteria, CXR shows no focal infiltrate  on reevaluation, patient has normal capillary refill and normal distal pulses  CTA shows no evidence of PE or consolidation  discussed above with patient and family who agree with plan for admission in setting of need for oxygen supplementation.

## 2019-08-28 NOTE — ED PROVIDER NOTE - OBJECTIVE STATEMENT
83 yo female with no PM hx presents with fever and chills. Per family, they reports that around midnight patient started having shaking chills so family called EMS to bring patient to ED for evaluation. Patient reports having a non productive cough for the last day. Patient states her "lungs hurt." Per daughter, the patient has been complaining of back pain for the last month that recently worsened. Patient denies any vomiting, diarrhea, abdominal pain, dysuria, hematuria and any other acute complaints.

## 2019-08-28 NOTE — CHART NOTE - NSCHARTNOTEFT_GEN_A_CORE
Pt with elevated second cardiac enzyme, troponin 0.131 --> 4.530. No complaints of chest pain or shortness of breath. Pt states she had epigastric pain at home and in the ER which resolved. Discussed with Dr. Sher, cardiology consult Dr Escobedo called. Additional EKG ordered, third cardiac enzyme due at 1400. Signout given to Dr. Wilde on 5N.

## 2019-08-28 NOTE — CONSULT NOTE ADULT - SUBJECTIVE AND OBJECTIVE BOX
PRESENTING CC:Elevated troponins    SUBJ: 83 year old female has past medical hx significant for celiac disease was brought in by daughter after pt was shaking last night. Pt with elevated second cardiac enzyme, troponin 0.131 --> 4.530. No complaints of chest pain or shortness of breath.       PMH -reviewed admission note, no change since admission  Heart failure: acute [ ] chronic [ ] acute or chronic [ ] diastolic [ ] systolic [ ] combined systolic and diastolic[ ]  DEVIN: ATN[ ] renal medullary necrosis [ ] CKD I [ ]CKDII [ ]CKD III [ ]CKD IV [ ]CKD V [ ]Other pathological lesions [ ]    MEDICATIONS  (STANDING):  aspirin enteric coated 81 milliGRAM(s) Oral daily  atorvastatin 40 milliGRAM(s) Oral at bedtime  heparin  Injectable 5000 Unit(s) SubCutaneous every 12 hours  pantoprazole    Tablet 40 milliGRAM(s) Oral before breakfast  sodium chloride 0.9%. 1000 milliLiter(s) (75 mL/Hr) IV Continuous <Continuous>      FAMILY HISTORY:  No family history of premature coronary artery disease or sudden cardiac death      REVIEW OF SYSTEMS:  Constitutional: [ ] fever, [ ]weight loss,  [ ]fatigue  Eyes: [ ] visual changes  Respiratory: [ ]shortness of breath;  [ ] cough, [ ]wheezing, [ ]chills, [ ]hemoptysis  Cardiovascular: [ ] chest pain, [ ]palpitations, [ ]dizziness,  [ ]leg swelling[ ]orthopnea[ ]PND  Gastrointestinal: [x ] abdominal pain, [ ]nausea, [ ]vomiting,  [ ]diarrhea   Genitourinary: [ ] dysuria, [ ] hematuria  Neurologic: [ ] headaches [ ] tremors[ ]weakness  Skin: [ ] itching, [ ]burning, [ ] rashes  Endocrine: [ ] heat or cold intolerance  Musculoskeletal: [ ] joint pain or swelling; [ ] muscle, back, or extremity pain  Psychiatric: [ ] depression, [ ]anxiety, [ ]mood swings, or [ ]difficulty sleeping  Hematologic: [ ] easy bruising, [ ] bleeding gums    [x] All remaining systems negative except as per above.   [ ]Unable to obtain.    Vital Signs Last 24 Hrs  T(C): 36.8 (28 Aug 2019 08:34), Max: 39.7 (28 Aug 2019 01:40)  T(F): 98.2 (28 Aug 2019 08:34), Max: 103.4 (28 Aug 2019 01:40)  HR: 66 (28 Aug 2019 08:34) (66 - 89)  BP: 112/45 (28 Aug 2019 08:34) (90/47 - 144/67)  RR: 18 (28 Aug 2019 08:34) (12 - 22)  SpO2: 96% (28 Aug 2019 08:34) (91% - 98%)  I&O's Summary      PHYSICAL EXAM:  General: No acute distress BMI-24.8  HEENT: EOMI, PERRL  Neck: Supple, [ ] JVD  Lungs: Equal air entry bilaterally; [ ] rales [ ] wheezing [ ] rhonchi  Heart: Regular rate and rhythm; [x ] murmur   2/6 [x ] systolic [ ] diastolic [ ] radiation[ ] rubs [ ]  gallops  Abdomen: Epigastric tender, bowel sounds present  Extremities: No clubbing, cyanosis, [ ] edema  Nervous system:  Alert & Oriented X3, no focal deficits  Psychiatric: Normal affect  Skin: No rashes or lesions    LABS:  08-28    140  |  109<H>  |  9   ----------------------------<  110<H>  3.6   |  27  |  0.68    Ca    7.5<L>      28 Aug 2019 07:53  Phos  3.0     08-28  Mg     1.9     08-28    TPro  6.3  /  Alb  2.8<L>  /  TBili  1.6<H>  /  DBili  x   /  AST  37  /  ALT  30  /  AlkPhos  56  08-28    Creatinine Trend: 0.68<--, 0.83<--                        10.7   7.24  )-----------( 164      ( 28 Aug 2019 07:53 )             33.9     PT/INR - ( 28 Aug 2019 02:29 )   PT: 15.3 sec;   INR: 1.37 ratio         PTT - ( 28 Aug 2019 02:29 )  PTT:25.2 sec  Lipid Panel: Serum Pro-Brain Natriuretic Peptide: 88 pg/mL (08-28 @ 02:29)    Cardiac Enzymes: CARDIAC MARKERS ( 28 Aug 2019 07:53 )  4.530 ng/mL / x     / x     / x     / x      CARDIAC MARKERS ( 28 Aug 2019 02:29 )  0.131 ng/mL / x     / x     / x     / x          Serum Pro-Brain Natriuretic Peptide: 88 pg/mL (08-28-19 @ 02:29)        RADIOLOGY:    ECG [my interpretation]:    TELEMETRY:    ECHO:    STRESS TEST:    CATHETERIZATION:      IMPRESSION AND PLAN: PRESENTING CC:Elevated troponins    SUBJ: 83 year old female has past medical hx significant for celiac disease was brought in by daughter after pt was shaking last night. Pt with elevated second cardiac enzyme, troponin 0.131 --> 4.530. No complaints of chest pain or shortness of breath.       PMH -reviewed admission note, no change since admission  Heart failure: acute [ ] chronic [ ] acute or chronic [ ] diastolic [ ] systolic [ ] combined systolic and diastolic[ ]  DEVIN: ATN[ ] renal medullary necrosis [ ] CKD I [ ]CKDII [ ]CKD III [ ]CKD IV [ ]CKD V [ ]Other pathological lesions [ ]    MEDICATIONS  (STANDING):  aspirin enteric coated 81 milliGRAM(s) Oral daily  atorvastatin 40 milliGRAM(s) Oral at bedtime  heparin  Injectable 5000 Unit(s) SubCutaneous every 12 hours  pantoprazole    Tablet 40 milliGRAM(s) Oral before breakfast  sodium chloride 0.9%. 1000 milliLiter(s) (75 mL/Hr) IV Continuous <Continuous>      FAMILY HISTORY:  No family history of premature coronary artery disease or sudden cardiac death      REVIEW OF SYSTEMS:  Constitutional: [ ] fever, [ ]weight loss,  [ ]fatigue  Eyes: [ ] visual changes  Respiratory: [ ]shortness of breath;  [ ] cough, [ ]wheezing, [ ]chills, [ ]hemoptysis  Cardiovascular: [ ] chest pain, [ ]palpitations, [ ]dizziness,  [ ]leg swelling[ ]orthopnea[ ]PND  Gastrointestinal: [x ] abdominal pain, [ ]nausea, [ ]vomiting,  [ ]diarrhea   Genitourinary: [ ] dysuria, [ ] hematuria  Neurologic: [ ] headaches [ ] tremors[ ]weakness  Skin: [ ] itching, [ ]burning, [ ] rashes  Endocrine: [ ] heat or cold intolerance  Musculoskeletal: [ ] joint pain or swelling; [ ] muscle, back, or extremity pain  Psychiatric: [ ] depression, [ ]anxiety, [ ]mood swings, or [ ]difficulty sleeping  Hematologic: [ ] easy bruising, [ ] bleeding gums    [x] All remaining systems negative except as per above.   [ ]Unable to obtain.    Vital Signs Last 24 Hrs  T(C): 36.8 (28 Aug 2019 08:34), Max: 39.7 (28 Aug 2019 01:40)  T(F): 98.2 (28 Aug 2019 08:34), Max: 103.4 (28 Aug 2019 01:40)  HR: 66 (28 Aug 2019 08:34) (66 - 89)  BP: 112/45 (28 Aug 2019 08:34) (90/47 - 144/67)  RR: 18 (28 Aug 2019 08:34) (12 - 22)  SpO2: 96% (28 Aug 2019 08:34) (91% - 98%)  I&O's Summary      PHYSICAL EXAM:  General: No acute distress BMI-24.8  HEENT: EOMI, PERRL  Neck: Supple, [ ] JVD  Lungs: Equal air entry bilaterally; [ ] rales [ ] wheezing [ ] rhonchi  Heart: Regular rate and rhythm; [x ] murmur   2/6 [x ] systolic [ ] diastolic [ ] radiation[ ] rubs [ ]  gallops  Abdomen: Epigastric tender, bowel sounds present  Extremities: No clubbing, cyanosis, [ ] edema  Nervous system:  Alert & Oriented X3, no focal deficits  Psychiatric: Normal affect  Skin: No rashes or lesions    LABS:  08-28    140  |  109<H>  |  9   ----------------------------<  110<H>  3.6   |  27  |  0.68    Ca    7.5<L>      28 Aug 2019 07:53  Phos  3.0     08-28  Mg     1.9     08-28    TPro  6.3  /  Alb  2.8<L>  /  TBili  1.6<H>  /  DBili  x   /  AST  37  /  ALT  30  /  AlkPhos  56  08-28    Creatinine Trend: 0.68<--, 0.83<--                        10.7   7.24  )-----------( 164      ( 28 Aug 2019 07:53 )             33.9     PT/INR - ( 28 Aug 2019 02:29 )   PT: 15.3 sec;   INR: 1.37 ratio         PTT - ( 28 Aug 2019 02:29 )  PTT:25.2 sec  Lipid Panel: Serum Pro-Brain Natriuretic Peptide: 88 pg/mL (08-28 @ 02:29)    Cardiac Enzymes: CARDIAC MARKERS ( 28 Aug 2019 07:53 )  4.530 ng/mL / x     / x     / x     / x      CARDIAC MARKERS ( 28 Aug 2019 02:29 )  0.131 ng/mL / x     / x     / x     / x          Serum Pro-Brain Natriuretic Peptide: 88 pg/mL (08-28-19 @ 02:29)        RADIOLOGY:   CT ANGIO CHEST IMPRESSION:   No pulmonary embolism.    ECG [my interpretation]:Sinus rhythm no acute ST T wave abnormalities    TELEMETRY:Sinus rhythm      IMPRESSION AND PLAN: PRESENTING CC:Elevated troponins    SUBJ: 83 year old female has past medical hx significant for celiac disease was brought in by daughter after pt was shaking last night. Pt with elevated second cardiac enzyme, troponin 0.131 --> 4.530. No complaints of chest pain or shortness of breath.       PMH -reviewed admission note, no change since admission  Heart failure: acute [ ] chronic [ ] acute or chronic [ ] diastolic [ ] systolic [ ] combined systolic and diastolic[ ]  DEVIN: ATN[ ] renal medullary necrosis [ ] CKD I [ ]CKDII [ ]CKD III [ ]CKD IV [ ]CKD V [ ]Other pathological lesions [ ]    MEDICATIONS  (STANDING):  aspirin enteric coated 81 milliGRAM(s) Oral daily  atorvastatin 40 milliGRAM(s) Oral at bedtime  heparin  Injectable 5000 Unit(s) SubCutaneous every 12 hours  pantoprazole    Tablet 40 milliGRAM(s) Oral before breakfast  sodium chloride 0.9%. 1000 milliLiter(s) (75 mL/Hr) IV Continuous <Continuous>      FAMILY HISTORY:  No family history of premature coronary artery disease or sudden cardiac death      REVIEW OF SYSTEMS:  Constitutional: [ ] fever, [ ]weight loss,  [ ]fatigue  Eyes: [ ] visual changes  Respiratory: [ ]shortness of breath;  [ ] cough, [ ]wheezing, [ ]chills, [ ]hemoptysis  Cardiovascular: [ ] chest pain, [ ]palpitations, [ ]dizziness,  [ ]leg swelling[ ]orthopnea[ ]PND  Gastrointestinal: [x ] abdominal pain, [ ]nausea, [ ]vomiting,  [ ]diarrhea   Genitourinary: [ ] dysuria, [ ] hematuria  Neurologic: [ ] headaches [ ] tremors[ ]weakness  Skin: [ ] itching, [ ]burning, [ ] rashes  Endocrine: [ ] heat or cold intolerance  Musculoskeletal: [ ] joint pain or swelling; [ ] muscle, back, or extremity pain  Psychiatric: [ ] depression, [ ]anxiety, [ ]mood swings, or [ ]difficulty sleeping  Hematologic: [ ] easy bruising, [ ] bleeding gums    [x] All remaining systems negative except as per above.   [ ]Unable to obtain.    Vital Signs Last 24 Hrs  T(C): 36.8 (28 Aug 2019 08:34), Max: 39.7 (28 Aug 2019 01:40)  T(F): 98.2 (28 Aug 2019 08:34), Max: 103.4 (28 Aug 2019 01:40)  HR: 66 (28 Aug 2019 08:34) (66 - 89)  BP: 112/45 (28 Aug 2019 08:34) (90/47 - 144/67)  RR: 18 (28 Aug 2019 08:34) (12 - 22)  SpO2: 96% (28 Aug 2019 08:34) (91% - 98%)  I&O's Summary      PHYSICAL EXAM:  General: No acute distress BMI-24.8  HEENT: EOMI, PERRL  Neck: Supple, [ ] JVD  Lungs: Equal air entry bilaterally; [ ] rales [ ] wheezing [ ] rhonchi  Heart: Regular rate and rhythm; [x ] murmur   2/6 [x ] systolic [ ] diastolic [ ] radiation[ ] rubs [ ]  gallops  Abdomen: Epigastric tender, bowel sounds present  Extremities: No clubbing, cyanosis, [ ] edema  Nervous system:  Alert & Oriented X3, no focal deficits  Psychiatric: Normal affect  Skin: No rashes or lesions    LABS:  08-28    140  |  109<H>  |  9   ----------------------------<  110<H>  3.6   |  27  |  0.68    Ca    7.5<L>      28 Aug 2019 07:53  Phos  3.0     08-28  Mg     1.9     08-28    TPro  6.3  /  Alb  2.8<L>  /  TBili  1.6<H>  /  DBili  x   /  AST  37  /  ALT  30  /  AlkPhos  56  08-28    Creatinine Trend: 0.68<--, 0.83<--                        10.7   7.24  )-----------( 164      ( 28 Aug 2019 07:53 )             33.9     PT/INR - ( 28 Aug 2019 02:29 )   PT: 15.3 sec;   INR: 1.37 ratio         PTT - ( 28 Aug 2019 02:29 )  PTT:25.2 sec  Lipid Panel: Serum Pro-Brain Natriuretic Peptide: 88 pg/mL (08-28 @ 02:29)    Cardiac Enzymes: CARDIAC MARKERS ( 28 Aug 2019 07:53 )  4.530 ng/mL / x     / x     / x     / x      CARDIAC MARKERS ( 28 Aug 2019 02:29 )  0.131 ng/mL / x     / x     / x     / x          Serum Pro-Brain Natriuretic Peptide: 88 pg/mL (08-28-19 @ 02:29)        RADIOLOGY:   CT ANGIO CHEST IMPRESSION:   No pulmonary embolism.    ECG [my interpretation]:Sinus rhythm no acute ST T wave abnormalities    TELEMETRY:Sinus rhythm      IMPRESSION AND PLAN:  83 year old female has past medical hx significant for celiac disease was brought in by daughter after pt was shaking last night.       Problem/Plan - 1:  ·  Problem: Sepsis.  Plan: pt came in with fever of 103, RR 22 meets sirs criteria  CT angio chest neg for consolidation, PE  U/A only positive for bacteria, no wbc count, pt does not have urinary symptoms   since pt has cough will start empiric rocephin and zithromax      Problem/Plan - 2:  ·  Problem: Elevated troponin.  Plan: pt is having epigastric pain, improves with rubbing- atypical pain   ekg NSR, trop elevated   No prior cardiac history-likely demand  -TTE L V function

## 2019-08-28 NOTE — CHART NOTE - NSCHARTNOTEFT_GEN_A_CORE
paged by RN that pt is complaining of chest pain- assessed pt- pain in the epigastric area- ordered maalox- repeating EKG- tele doesn't any acute changes

## 2019-08-29 LAB
ANION GAP SERPL CALC-SCNC: 5 MMOL/L — SIGNIFICANT CHANGE UP (ref 5–17)
BUN SERPL-MCNC: 6 MG/DL — LOW (ref 7–18)
CALCIUM SERPL-MCNC: 8.4 MG/DL — SIGNIFICANT CHANGE UP (ref 8.4–10.5)
CHLORIDE SERPL-SCNC: 105 MMOL/L — SIGNIFICANT CHANGE UP (ref 96–108)
CO2 SERPL-SCNC: 29 MMOL/L — SIGNIFICANT CHANGE UP (ref 22–31)
CREAT SERPL-MCNC: 0.6 MG/DL — SIGNIFICANT CHANGE UP (ref 0.5–1.3)
GLUCOSE SERPL-MCNC: 99 MG/DL — SIGNIFICANT CHANGE UP (ref 70–99)
HCT VFR BLD CALC: 36.1 % — SIGNIFICANT CHANGE UP (ref 34.5–45)
HGB BLD-MCNC: 11.3 G/DL — LOW (ref 11.5–15.5)
MAGNESIUM SERPL-MCNC: 2.2 MG/DL — SIGNIFICANT CHANGE UP (ref 1.6–2.6)
MCHC RBC-ENTMCNC: 26.8 PG — LOW (ref 27–34)
MCHC RBC-ENTMCNC: 31.3 GM/DL — LOW (ref 32–36)
MCV RBC AUTO: 85.7 FL — SIGNIFICANT CHANGE UP (ref 80–100)
NRBC # BLD: 0 /100 WBCS — SIGNIFICANT CHANGE UP (ref 0–0)
PHOSPHATE SERPL-MCNC: 2.3 MG/DL — LOW (ref 2.5–4.5)
PLATELET # BLD AUTO: 192 K/UL — SIGNIFICANT CHANGE UP (ref 150–400)
POTASSIUM SERPL-MCNC: 3.8 MMOL/L — SIGNIFICANT CHANGE UP (ref 3.5–5.3)
POTASSIUM SERPL-SCNC: 3.8 MMOL/L — SIGNIFICANT CHANGE UP (ref 3.5–5.3)
RBC # BLD: 4.21 M/UL — SIGNIFICANT CHANGE UP (ref 3.8–5.2)
RBC # FLD: 15.3 % — HIGH (ref 10.3–14.5)
SODIUM SERPL-SCNC: 139 MMOL/L — SIGNIFICANT CHANGE UP (ref 135–145)
WBC # BLD: 6.41 K/UL — SIGNIFICANT CHANGE UP (ref 3.8–10.5)
WBC # FLD AUTO: 6.41 K/UL — SIGNIFICANT CHANGE UP (ref 3.8–10.5)

## 2019-08-29 PROCEDURE — 78452 HT MUSCLE IMAGE SPECT MULT: CPT | Mod: 26

## 2019-08-29 PROCEDURE — 99233 SBSQ HOSP IP/OBS HIGH 50: CPT | Mod: GC

## 2019-08-29 PROCEDURE — 99222 1ST HOSP IP/OBS MODERATE 55: CPT | Mod: GC

## 2019-08-29 PROCEDURE — 93016 CV STRESS TEST SUPVJ ONLY: CPT

## 2019-08-29 PROCEDURE — 93018 CV STRESS TEST I&R ONLY: CPT

## 2019-08-29 RX ORDER — ERGOCALCIFEROL 1.25 MG/1
50000 CAPSULE ORAL
Refills: 0 | Status: DISCONTINUED | OUTPATIENT
Start: 2019-08-29 | End: 2019-08-30

## 2019-08-29 RX ADMIN — AZITHROMYCIN 250 MILLIGRAM(S): 500 TABLET, FILM COATED ORAL at 03:06

## 2019-08-29 RX ADMIN — HEPARIN SODIUM 5000 UNIT(S): 5000 INJECTION INTRAVENOUS; SUBCUTANEOUS at 17:25

## 2019-08-29 RX ADMIN — Medication 81 MILLIGRAM(S): at 11:50

## 2019-08-29 RX ADMIN — HEPARIN SODIUM 5000 UNIT(S): 5000 INJECTION INTRAVENOUS; SUBCUTANEOUS at 05:33

## 2019-08-29 RX ADMIN — PANTOPRAZOLE SODIUM 40 MILLIGRAM(S): 20 TABLET, DELAYED RELEASE ORAL at 05:33

## 2019-08-29 RX ADMIN — ATORVASTATIN CALCIUM 40 MILLIGRAM(S): 80 TABLET, FILM COATED ORAL at 21:37

## 2019-08-29 RX ADMIN — MORPHINE SULFATE 1 MILLIGRAM(S): 50 CAPSULE, EXTENDED RELEASE ORAL at 00:22

## 2019-08-29 RX ADMIN — CEFTRIAXONE 100 MILLIGRAM(S): 500 INJECTION, POWDER, FOR SOLUTION INTRAMUSCULAR; INTRAVENOUS at 03:06

## 2019-08-29 NOTE — CONSULT NOTE ADULT - ATTENDING COMMENTS
Patient was seen and examined,interim events noted,labs and radiology studies reviewed.  Carlos Escobedo MD,FACC.  [address] [place] [address].  [id number].  [phone]
Pt seen and examined with medical resident. I agree with his history, physical, assessment and plan with my additions below.    Acute viral prodome    Clinically stable, appears comfortable. No respiratory distress  Symptoms appear very acute. WBC normal and no fever while in the hospital. No increased O2 requirement. CT angio shows no evidence of pneumonia or PE. Nonspecific bilateral centrlobular emphysematous changes which may be from prior enviromental exposures. She has no long term symptoms    Recommend symptomatic support. No long term medications or investigations from a pulmonary standpoint required.

## 2019-08-29 NOTE — CONSULT NOTE ADULT - SUBJECTIVE AND OBJECTIVE BOX
=================Interval HPI===================  - HPI: Patient is 83 year old female has past medical hx significant for celiac disease was brought in by daughter after pt was shaking last night.     As per daughter pt was in her usual health until last night when she woke up at midnight pt started to have shaking shivering pt was brought in to ED pt was code sepsis for fever of 103, pt had CT angio chest which was neg for PE, pt was given Rocephin and Zithromax for concern for pneumonia. Pt is very poor historian complaining of dry cough for a week, epigastric pain for several days, severe pain, non radiating, unable to provide much information, per daughter they were told pt has acid reflux, pt is having dizziness and upper back pain. Pt denies headache, sob, palpitation, abdominal pain, n/v/d, hematuria, dysuria hemoptysis, rash.      - Social History  Smoking:  Alcohol:  Drugs:  Work History:    ================CHIEF COMPLAINT===============  Patient is a 84y old  Female who presents with a chief complaint of Sepsis/ epigastric pain (28 Aug 2019 12:12)        =========INTERVAL HPI/OVERNIGHT EVENTS=========  Offers no new complaints      ============CURRENT MEDICATIONS===============    MEDICATIONS  (STANDING):  aspirin enteric coated 81 milliGRAM(s) Oral daily  atorvastatin 40 milliGRAM(s) Oral at bedtime  azithromycin  IVPB 500 milliGRAM(s) IV Intermittent every 24 hours  cefTRIAXone   IVPB 1000 milliGRAM(s) IV Intermittent every 24 hours  heparin  Injectable 5000 Unit(s) SubCutaneous every 12 hours  pantoprazole    Tablet 40 milliGRAM(s) Oral before breakfast  sodium chloride 0.9%. 1000 milliLiter(s) (75 mL/Hr) IV Continuous <Continuous>    MEDICATIONS  (PRN):  aluminum hydroxide/magnesium hydroxide/simethicone Suspension 30 milliLiter(s) Oral every 4 hours PRN Dyspepsia        ============REVIEW OF SYSTEMS==================    CONSTITUTIONAL: No fever  EYES: no acute visual disturbances  NECK: No pain or stiffness  RESPIRATORY: No cough; No shortness of breath  CARDIOVASCULAR: No chest pain, no palpitations  GASTROINTESTINAL: No pain. No nausea or vomiting; No diarrhea   NEUROLOGICAL: No headache or numbness, no tremors  MUSCULOSKELETAL: No joint pain, no muscle pain  GENITOURINARY: no dysuria, no frequency, no hesitancy  PSYCHIATRY: no depression , no anxiety  ALL OTHER  ROS negative      ================VITALS SIGNS=====================  Vital Signs Last 24 Hrs  T(C): 36.9 (29 Aug 2019 11:45), Max: 37 (28 Aug 2019 22:17)  T(F): 98.4 (29 Aug 2019 11:45), Max: 98.6 (28 Aug 2019 22:17)  HR: 71 (29 Aug 2019 11:45) (60 - 75)  BP: 137/57 (29 Aug 2019 11:45) (111/64 - 161/66)  BP(mean): --  RR: 18 (29 Aug 2019 11:45) (18 - 18)  SpO2: 99% (29 Aug 2019 11:45) (94% - 99%)    ===============PHYSICAL EXAM====================    GENERAL: NAD  HEENT: Normocephalic;  conjunctivae and sclerae clear; moist mucous membranes;   NECK : supple  CHEST/LUNG: Clear to auscultation bilaterally with good air entry   HEART: S1 S2  regular; no murmurs, gallops or rubs  ABDOMEN: Soft, Nontender, Nondistended; Bowel sounds present  EXTREMITIES: no cyanosis; no edema; no calf tenderness  SKIN: warm and dry; no rash  NERVOUS SYSTEM:  Awake and alert; Oriented  to place, person and time    ==============LABORATORIES======================  LABS:                        11.3   6.41  )-----------( 192      ( 29 Aug 2019 07:24 )             36.1         139  |  105  |  6<L>  ----------------------------<  99  3.8   |  29  |  0.60    Ca    8.4      29 Aug 2019 07:24  Phos  2.3       Mg     2.2         TPro  6.3  /  Alb  2.8<L>  /  TBili  1.6<H>  /  DBili  x   /  AST  37  /  ALT  30  /  AlkPhos  56      PT/INR - ( 28 Aug 2019 02:29 )   PT: 15.3 sec;   INR: 1.37 ratio         PTT - ( 28 Aug 2019 02:29 )  PTT:25.2 sec  Urinalysis Basic - ( 28 Aug 2019 03:40 )    Color: Yellow / Appearance: Clear / S.005 / pH: x  Gluc: x / Ketone: Negative  / Bili: Negative / Urobili: Negative   Blood: x / Protein: 30 mg/dL / Nitrite: Negative   Leuk Esterase: Small / RBC: 5-10 /HPF / WBC 3-5 /HPF   Sq Epi: x / Non Sq Epi: Few /HPF / Bacteria: Moderate /HPF      CAPILLARY BLOOD GLUCOSE          =============INPUTS/OUPUTS=====================    19 @ 07:01  -  19 @ 07:00  --------------------------------------------------------  IN: 1236 mL / OUT: 799 mL / NET: 437 mL          RADIOLOGY & ADDITIONAL TESTS:    Imaging Personally Reviewed:  YES    Consultant(s) Notes Reviewed:   YES    Care Discussed with Consultants : YES    Plan of care was discussed with patient  and /or primary care giver; all questions and concerns were addressed and care was aligned with patient's wishes. Time was allowed for questions that were answered to the best of my abilities =================Interval HPI===================  - HPI: Patient is 83 year old female has past medical hx significant for celiac disease was brought in by daughter after pt was shaking last night.     As per patient she was in her usual health until last night when she woke up at midnight pt started to have shaking shivering. Was brought in to ED pt was code sepsis for fever of 103. She has been having cough (Non productive) and SOB upon ambulation. Work up was done Chest Xray showing : Hilar and interstitial prominence suggests the possibility   for an element of interstitial edema. Started on Rocephin and Zithromax for concern for pneumonia. Pt is having dizziness and upper back pain. Pt denies headache, sob, palpitation, abdominal pain, n/v/d, hematuria, dysuria hemoptysis, rash.      - Social History  Smoking: Denied   Alcohol: Denied  Drugs: Denied  Work History: Textiles worker    ================CHIEF COMPLAINT===============  Patient is a 84y old  Female who presents with a chief complaint of Sepsis/ epigastric pain (28 Aug 2019 12:12)        =========INTERVAL HPI/OVERNIGHT EVENTS=========  Offers no new complaints      ============CURRENT MEDICATIONS===============    MEDICATIONS  (STANDING):  aspirin enteric coated 81 milliGRAM(s) Oral daily  atorvastatin 40 milliGRAM(s) Oral at bedtime  azithromycin  IVPB 500 milliGRAM(s) IV Intermittent every 24 hours  cefTRIAXone   IVPB 1000 milliGRAM(s) IV Intermittent every 24 hours  heparin  Injectable 5000 Unit(s) SubCutaneous every 12 hours  pantoprazole    Tablet 40 milliGRAM(s) Oral before breakfast  sodium chloride 0.9%. 1000 milliLiter(s) (75 mL/Hr) IV Continuous <Continuous>    MEDICATIONS  (PRN):  aluminum hydroxide/magnesium hydroxide/simethicone Suspension 30 milliLiter(s) Oral every 4 hours PRN Dyspepsia        ============REVIEW OF SYSTEMS==================    CONSTITUTIONAL: No fever  EYES: no acute visual disturbances  NECK: No pain or stiffness  RESPIRATORY: No cough; No shortness of breath  CARDIOVASCULAR: No chest pain, no palpitations  GASTROINTESTINAL: No pain. No nausea or vomiting; No diarrhea   NEUROLOGICAL: No headache or numbness, no tremors  MUSCULOSKELETAL: No joint pain, no muscle pain  GENITOURINARY: no dysuria, no frequency, no hesitancy  PSYCHIATRY: no depression , no anxiety  ALL OTHER  ROS negative      ================VITALS SIGNS=====================  Vital Signs Last 24 Hrs  T(C): 36.9 (29 Aug 2019 11:45), Max: 37 (28 Aug 2019 22:17)  T(F): 98.4 (29 Aug 2019 11:45), Max: 98.6 (28 Aug 2019 22:17)  HR: 71 (29 Aug 2019 11:45) (60 - 75)  BP: 137/57 (29 Aug 2019 11:45) (111/64 - 161/66)  BP(mean): --  RR: 18 (29 Aug 2019 11:45) (18 - 18)  SpO2: 99% (29 Aug 2019 11:45) (94% - 99%)    ===============PHYSICAL EXAM====================    GENERAL: NAD  HEENT: Normocephalic;  conjunctivae and sclerae clear; moist mucous membranes;   NECK : supple  CHEST/LUNG: Clear to auscultation bilaterally with good air entry   HEART: S1 S2  regular; no murmurs, gallops or rubs  ABDOMEN: Soft, Nontender, Nondistended; Bowel sounds present  EXTREMITIES: no cyanosis; no edema; no calf tenderness  SKIN: warm and dry; no rash  NERVOUS SYSTEM:  Awake and alert; Oriented  to place, person and time    ==============LABORATORIES======================  LABS:                        11.3   6.41  )-----------( 192      ( 29 Aug 2019 07:24 )             36.1     08-    139  |  105  |  6<L>  ----------------------------<  99  3.8   |  29  |  0.60    Ca    8.4      29 Aug 2019 07:24  Phos  2.3       Mg     2.2         TPro  6.3  /  Alb  2.8<L>  /  TBili  1.6<H>  /  DBili  x   /  AST  37  /  ALT  30  /  AlkPhos  56      PT/INR - ( 28 Aug 2019 02:29 )   PT: 15.3 sec;   INR: 1.37 ratio         PTT - ( 28 Aug 2019 02:29 )  PTT:25.2 sec  Urinalysis Basic - ( 28 Aug 2019 03:40 )    Color: Yellow / Appearance: Clear / S.005 / pH: x  Gluc: x / Ketone: Negative  / Bili: Negative / Urobili: Negative   Blood: x / Protein: 30 mg/dL / Nitrite: Negative   Leuk Esterase: Small / RBC: 5-10 /HPF / WBC 3-5 /HPF   Sq Epi: x / Non Sq Epi: Few /HPF / Bacteria: Moderate /HPF      CAPILLARY BLOOD GLUCOSE          =============INPUTS/OUPUTS=====================    19 @ 07:01  -  19 @ 07:00  --------------------------------------------------------  IN: 1236 mL / OUT: 799 mL / NET: 437 mL          RADIOLOGY & ADDITIONAL TESTS:    Imaging Personally Reviewed:  YES    Consultant(s) Notes Reviewed:   YES    Care Discussed with Consultants : YES    Plan of care was discussed with patient  and /or primary care giver; all questions and concerns were addressed and care was aligned with patient's wishes. Time was allowed for questions that were answered to the best of my abilities

## 2019-08-29 NOTE — PROGRESS NOTE ADULT - PROBLEM SELECTOR PLAN 1
pt came in with fever of 103, RR 22 meets sirs criteria  CT angio chest neg for consolidation, PE  U/A only positive for bacteria, no wbc count, pt does not have urinary symptoms   since pt has cough will start empiric rocephin and zithromax  f/u blood and urine culture   s/p 2.8 L ivf c/w ns at 75 cc/hr pt came in with fever of 103, RR 22 meets sirs criteria  CT angio chest neg for consolidation, PE  U/A only positive for bacteria, no wbc count, pt does not have urinary symptoms   since pt has cough will start empiric rocephin and zithromax  f/u blood culture: neg  []FU UC  s/p 2.8 L ivf c/w ns at 75 cc/hr  [x] FU pulm as per attending: no further recommendation

## 2019-08-29 NOTE — CONSULT NOTE ADULT - ASSESSMENT
Assessment:  Imaging:   CT of the chest: Patent central airways. No pulmonary nodules, masses   or consolidation. Centrilobular emphysema. Subsegmental, dependent   atelectasis. Subsegmental atelectasis.    Echocardiogram: Left Ventricle: Normal Left Ventricular Systolic Function,  (EF = 55%) Normal left ventricular internal dimensions and wall thicknesses. Grade I diastolic dysfunction (Impaired  relaxation). Right Heart: Normal right atrium. Normal right ventricular size and systolic function. There is trace tricuspid regurgitation. Normal pulmonic valve. Patent central airways. No pulmonary nodules, masses   or consolidation. Centrilobular emphysema. Subsegmental, dependent   atelectasis. Subsegmental atelectasis.      Recommendations: Assessment:  - CT of the chest showing: Centrilobular emphysema. Subsegmental, dependent atelectasis. with subsegmental atelectasis. 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 Patient currently asymptomatic at this time.   - Dyspnea could be related to acute bronchitis picture likely viral. No infiltrates are appreciated in lungs.  Lung are clear and patient is able to performed her household activities.   - Elevated troponin. Likely demand in the setting of respiratory infection. Blood Cultures negative. Echocardiogram showing Normal Left Ventricular Systolic Function,  (EF = 55%) Normal left ventricular internal dimensions and wall thicknesses. Grade I diastolic dysfunction (Impaired relaxation). Right Heart: Normal right atrium. Normal right ventricular size and systolic function. There is trace tricuspid regurgitation.   NST Negative study for significant reversible ischemia    Recommendations:  - Patient clinically better and currently asymptomatic. Not currently wheezing or on any respiratory distress. Recommend a to complete a short course of ABx to complete for 5 days, currently day #2.   - Follow up with her PCP upon discharge   - No need for any additional inhalers at this time since the patient is not requiring them during admission. Assessment:  - CT of the chest showing: Centrilobular emphysema. Subsegmental, dependent atelectasis. with subsegmental atelectasis. 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 Patient currently asymptomatic at this time.   - Dyspnea could be related to acute bronchitis picture likely viral. No infiltrates are appreciated in lungs.  Lung are clear and patient is able to performed her household activities.   - Elevated troponin. Likely demand in the setting of respiratory infection. Blood Cultures negative. Echocardiogram showing Normal Left Ventricular Systolic Function,  (EF = 55%) Normal left ventricular internal dimensions and wall thicknesses. Grade I diastolic dysfunction (Impaired relaxation). Right Heart: Normal right atrium. Normal right ventricular size and systolic function. There is trace tricuspid regurgitation.   NST Negative study for significant reversible ischemia    Recommendations:  - Patient clinically better and currently asymptomatic. Not currently wheezing or on any respiratory distress. Recommend a to complete a short course of ABx to complete for 5 days, currently day #2.   - Follow up with her PCP upon discharge   - No need for any additional inhalers at this time since the patient has not required them during the course of admission.

## 2019-08-29 NOTE — PROGRESS NOTE ADULT - SUBJECTIVE AND OBJECTIVE BOX
Patient is a 84y old  Female who presents with a chief complaint of Sepsis/ epigastric pain (29 Aug 2019 14:42)      INTERVAL HPI/OVERNIGHT EVENTS:      REVIEW OF SYSTEMS:  CONSTITUTIONAL: No fever, weight loss, or fatigue  EYES: No eye pain, visual disturbances, or discharge  ENMT:  No difficulty hearing, tinnitus, vertigo; No sinus or throat pain  NECK: No pain or stiffness  BREASTS: No pain, masses, or nipple discharge  RESPIRATORY: No cough, wheezing, chills or hemoptysis; No shortness of breath  CARDIOVASCULAR: No chest pain, palpitations, dizziness, or leg swelling  GASTROINTESTINAL: No abdominal or epigastric pain. No nausea, vomiting, or hematemesis; No diarrhea or constipation. No melena or hematochezia.  GENITOURINARY: No dysuria, frequency, hematuria, or incontinence  NEUROLOGICAL: No headaches, memory loss, loss of strength, numbness, or tremors  SKIN: No itching, burning, rashes, or lesions   LYMPH NODES: No enlarged glands  ENDOCRINE: No heat or cold intolerance; No hair loss  MUSCULOSKELETAL: No joint pain or swelling; No muscle, back, or extremity pain  HEME/LYMPH: No easy bruising, or bleeding gums  ALLERY AND IMMUNOLOGIC: No hives or eczema    FAMILY HISTORY:    Vital Signs Last 24 Hrs  T(C): 36.7 (29 Aug 2019 15:57), Max: 37 (28 Aug 2019 22:17)  T(F): 98 (29 Aug 2019 15:57), Max: 98.6 (28 Aug 2019 22:17)  HR: 65 (29 Aug 2019 15:57) (60 - 75)  BP: 139/58 (29 Aug 2019 15:57) (127/65 - 161/66)  BP(mean): --  RR: 18 (29 Aug 2019 15:57) (18 - 18)  SpO2: 96% (29 Aug 2019 15:57) (94% - 99%)    08-28-19 @ 07:01  -  08-29-19 @ 07:00  --------------------------------------------------------  IN: 1236 mL / OUT: 799 mL / NET: 437 mL        PHYSICAL EXAM:  GENERAL: NAD, well-groomed, well-developed  HEAD:  Atraumatic, Normocephalic  EYES: EOMI, PERRLA, conjunctiva and sclera clear  ENMT: No tonsillar erythema, exudates, or enlargement; Moist mucous membranes, Good dentition, No lesions  NECK: Supple, No JVD, Normal thyroid  NERVOUS SYSTEM:  Alert & Oriented X3, Good concentration; Motor Strength 5/5 B/L upper and lower extremities; DTRs 2+ intact and symmetric  CHEST/LUNG: Clear to percussion bilaterally; No rales, rhonchi, wheezing, or rubs  HEART: Regular rate and rhythm; No murmurs, rubs, or gallops  ABDOMEN: Soft, Nontender, Nondistended; Bowel sounds present  EXTREMITIES:  2+ Peripheral Pulses, No clubbing, cyanosis, or edema  LYMPH: No lymphadenopathy noted  SKIN: No rashes or lesions    Consultant(s) Notes Reviewed:  [x ] YES  [ ] NO  Care Discussed with Consultants/Other Providers [ x] YES  [ ] NO    LABS:        RADIOLOGY & ADDITIONAL TESTS:    Imaging Personally Reviewed:  [ ] YES  [ ] NO  aluminum hydroxide/magnesium hydroxide/simethicone Suspension 30 milliLiter(s) Oral every 4 hours PRN  aspirin enteric coated 81 milliGRAM(s) Oral daily  atorvastatin 40 milliGRAM(s) Oral at bedtime  azithromycin  IVPB 500 milliGRAM(s) IV Intermittent every 24 hours  cefTRIAXone   IVPB 1000 milliGRAM(s) IV Intermittent every 24 hours  heparin  Injectable 5000 Unit(s) SubCutaneous every 12 hours  pantoprazole    Tablet 40 milliGRAM(s) Oral before breakfast  sodium chloride 0.9%. 1000 milliLiter(s) IV Continuous <Continuous>      HEALTH ISSUES - PROBLEM Dx:  Prophylactic measure: Prophylactic measure  Epigastric abdominal pain: Epigastric abdominal pain  Elevated troponin: Elevated troponin  Sepsis: Sepsis Patient is a 84y old  Female who presents with a chief complaint of Sepsis/ epigastric pain (29 Aug 2019 14:42)      INTERVAL HPI/OVERNIGHT EVENTS: pt seen and examined, no overnight acute events,  co atypical chest pain . reproducible chest pain with palpation , costochondritis?          REVIEW OF SYSTEMS:  CONSTITUTIONAL: No fever, weight loss, or fatigue  EYES: No eye pain, visual disturbances, or discharge  ENMT:  No difficulty hearing, tinnitus, vertigo; No sinus or throat pain  NECK: No pain or stiffness  BREASTS: No pain, masses, or nipple discharge  RESPIRATORY: No cough, wheezing, chills or hemoptysis; No shortness of breath  CARDIOVASCULAR: No chest pain, palpitations, dizziness, or leg swelling  GASTROINTESTINAL: No abdominal or epigastric pain. No nausea, vomiting, or hematemesis; No diarrhea or constipation. No melena or hematochezia.  GENITOURINARY: No dysuria, frequency, hematuria, or incontinence  NEUROLOGICAL: No headaches, memory loss, loss of strength, numbness, or tremors  SKIN: No itching, burning, rashes, or lesions   LYMPH NODES: No enlarged glands  ENDOCRINE: No heat or cold intolerance; No hair loss  MUSCULOSKELETAL: No joint pain or swelling; No muscle, back, or extremity pain  HEME/LYMPH: No easy bruising, or bleeding gums  ALLERY AND IMMUNOLOGIC: No hives or eczema    FAMILY HISTORY:    Vital Signs Last 24 Hrs  T(C): 36.7 (29 Aug 2019 15:57), Max: 37 (28 Aug 2019 22:17)  T(F): 98 (29 Aug 2019 15:57), Max: 98.6 (28 Aug 2019 22:17)  HR: 65 (29 Aug 2019 15:57) (60 - 75)  BP: 139/58 (29 Aug 2019 15:57) (127/65 - 161/66)  BP(mean): --  RR: 18 (29 Aug 2019 15:57) (18 - 18)  SpO2: 96% (29 Aug 2019 15:57) (94% - 99%)    08-28-19 @ 07:01  -  08-29-19 @ 07:00  --------------------------------------------------------  IN: 1236 mL / OUT: 799 mL / NET: 437 mL        PHYSICAL EXAM:  GENERAL: NAD, well-groomed, well-developed  HEAD:  Atraumatic, Normocephalic  EYES: EOMI, PERRLA, conjunctiva and sclera clear  ENMT: No tonsillar erythema, exudates, or enlargement; Moist mucous membranes, Good dentition, No lesions  NECK: Supple, No JVD, Normal thyroid  NERVOUS SYSTEM:  Alert & Oriented X3, Good concentration; Motor Strength 5/5 B/L upper and lower extremities; DTRs 2+ intact and symmetric  CHEST/LUNG: Clear to percussion bilaterally; No rales, rhonchi, wheezing, or rubs  HEART: Regular rate and rhythm; No murmurs, rubs, or gallops  ABDOMEN: Soft, Nontender, Nondistended; Bowel sounds present  EXTREMITIES:  2+ Peripheral Pulses, No clubbing, cyanosis, or edema  LYMPH: No lymphadenopathy noted  SKIN: No rashes or lesions    Consultant(s) Notes Reviewed:  [x ] YES  [ ] NO  Care Discussed with Consultants/Other Providers [ x] YES  [ ] NO    LABS:        RADIOLOGY & ADDITIONAL TESTS:    Imaging Personally Reviewed:  [ ] YES  [ ] NO  aluminum hydroxide/magnesium hydroxide/simethicone Suspension 30 milliLiter(s) Oral every 4 hours PRN  aspirin enteric coated 81 milliGRAM(s) Oral daily  atorvastatin 40 milliGRAM(s) Oral at bedtime  azithromycin  IVPB 500 milliGRAM(s) IV Intermittent every 24 hours  cefTRIAXone   IVPB 1000 milliGRAM(s) IV Intermittent every 24 hours  heparin  Injectable 5000 Unit(s) SubCutaneous every 12 hours  pantoprazole    Tablet 40 milliGRAM(s) Oral before breakfast  sodium chloride 0.9%. 1000 milliLiter(s) IV Continuous <Continuous>      HEALTH ISSUES - PROBLEM Dx:  Prophylactic measure: Prophylactic measure  Epigastric abdominal pain: Epigastric abdominal pain  Elevated troponin: Elevated troponin  Sepsis: Sepsis

## 2019-08-29 NOTE — PROGRESS NOTE ADULT - PROBLEM SELECTOR PLAN 2
pt is having epigastric pain, improves with rubbing- atypical pain   ekg NSR, trop elevated 0.131 will trend it down 0: T2: 4.5-->T3:5.8   full dose apisin was given, lipitor started,  will hold bb since bp is running on lower side  echocardiogram, lipid panel : LDL:50, hba1c: 5.7  tele monitoring  cardiology evaluation  repeat ECG in AM 8/28 : Invrt Ts, Tn:4.5  repeat ECG in PM 8/28 : Invrt Ts. Tn:5.8  will perform NST AM pt is having epigastric pain, improves with rubbing- atypical pain   ekg NSR, trop elevated 0.131 will trend it down 0: T2: 4.5-->T3:5.8-->4.1  full dose apisin was given, lipitor started,  will hold bb since bp is running on lower side  echocardiogram, lipid panel : LDL:50, hba1c: 5.7  tele monitoring  cardiology evaluation: Antonios NST  will perform NST 8/29 : neg

## 2019-08-30 ENCOUNTER — TRANSCRIPTION ENCOUNTER (OUTPATIENT)
Age: 84
End: 2019-08-30

## 2019-08-30 ENCOUNTER — CHART COPY (OUTPATIENT)
Age: 84
End: 2019-08-30

## 2019-08-30 VITALS
DIASTOLIC BLOOD PRESSURE: 61 MMHG | TEMPERATURE: 98 F | HEART RATE: 65 BPM | SYSTOLIC BLOOD PRESSURE: 144 MMHG | RESPIRATION RATE: 18 BRPM | OXYGEN SATURATION: 100 %

## 2019-08-30 LAB
ANION GAP SERPL CALC-SCNC: 5 MMOL/L — SIGNIFICANT CHANGE UP (ref 5–17)
BUN SERPL-MCNC: 5 MG/DL — LOW (ref 7–18)
CALCIUM SERPL-MCNC: 9.1 MG/DL — SIGNIFICANT CHANGE UP (ref 8.4–10.5)
CHLORIDE SERPL-SCNC: 104 MMOL/L — SIGNIFICANT CHANGE UP (ref 96–108)
CO2 SERPL-SCNC: 32 MMOL/L — HIGH (ref 22–31)
CREAT SERPL-MCNC: 0.62 MG/DL — SIGNIFICANT CHANGE UP (ref 0.5–1.3)
GLUCOSE SERPL-MCNC: 93 MG/DL — SIGNIFICANT CHANGE UP (ref 70–99)
HCT VFR BLD CALC: 36.4 % — SIGNIFICANT CHANGE UP (ref 34.5–45)
HGB BLD-MCNC: 11.7 G/DL — SIGNIFICANT CHANGE UP (ref 11.5–15.5)
MAGNESIUM SERPL-MCNC: 2.3 MG/DL — SIGNIFICANT CHANGE UP (ref 1.6–2.6)
MCHC RBC-ENTMCNC: 27.3 PG — SIGNIFICANT CHANGE UP (ref 27–34)
MCHC RBC-ENTMCNC: 32.1 GM/DL — SIGNIFICANT CHANGE UP (ref 32–36)
MCV RBC AUTO: 84.8 FL — SIGNIFICANT CHANGE UP (ref 80–100)
NRBC # BLD: 0 /100 WBCS — SIGNIFICANT CHANGE UP (ref 0–0)
PHOSPHATE SERPL-MCNC: 3.1 MG/DL — SIGNIFICANT CHANGE UP (ref 2.5–4.5)
PLATELET # BLD AUTO: 202 K/UL — SIGNIFICANT CHANGE UP (ref 150–400)
POTASSIUM SERPL-MCNC: 3.4 MMOL/L — LOW (ref 3.5–5.3)
POTASSIUM SERPL-SCNC: 3.4 MMOL/L — LOW (ref 3.5–5.3)
RBC # BLD: 4.29 M/UL — SIGNIFICANT CHANGE UP (ref 3.8–5.2)
RBC # FLD: 15.4 % — HIGH (ref 10.3–14.5)
SODIUM SERPL-SCNC: 141 MMOL/L — SIGNIFICANT CHANGE UP (ref 135–145)
WBC # BLD: 5.32 K/UL — SIGNIFICANT CHANGE UP (ref 3.8–10.5)
WBC # FLD AUTO: 5.32 K/UL — SIGNIFICANT CHANGE UP (ref 3.8–10.5)

## 2019-08-30 PROCEDURE — 99232 SBSQ HOSP IP/OBS MODERATE 35: CPT | Mod: GC

## 2019-08-30 PROCEDURE — 99238 HOSP IP/OBS DSCHRG MGMT 30/<: CPT

## 2019-08-30 RX ORDER — IBUPROFEN 200 MG
0 TABLET ORAL
Qty: 0 | Refills: 0 | DISCHARGE

## 2019-08-30 RX ORDER — ACETAMINOPHEN 500 MG
2 TABLET ORAL
Qty: 60 | Refills: 0
Start: 2019-08-30 | End: 2019-09-08

## 2019-08-30 RX ORDER — ERGOCALCIFEROL 1.25 MG/1
1 CAPSULE ORAL
Qty: 4 | Refills: 0
Start: 2019-08-30 | End: 2019-09-28

## 2019-08-30 RX ADMIN — Medication 81 MILLIGRAM(S): at 11:36

## 2019-08-30 RX ADMIN — ERGOCALCIFEROL 50000 UNIT(S): 1.25 CAPSULE ORAL at 11:36

## 2019-08-30 RX ADMIN — PANTOPRAZOLE SODIUM 40 MILLIGRAM(S): 20 TABLET, DELAYED RELEASE ORAL at 06:27

## 2019-08-30 NOTE — DISCHARGE NOTE NURSING/CASE MANAGEMENT/SOCIAL WORK - PATIENT PORTAL LINK FT
You can access the FollowMyHealth Patient Portal offered by [facility] by registering at the following website: [email or web address]. By joining [facility]’s FollowMyHealth portal, you will also be able to view your health information using other applications (apps) compatible with our system.

## 2019-08-30 NOTE — PROGRESS NOTE ADULT - ATTENDING COMMENTS
Agree with above    Asymptomatic.  Afebrile, WBC normal  No evidence of pneumonia. Possible viral prodrome  Stable from a pulmonary standpoint
Patient was examined and discussed with Dr. Wilde.   She is feeling much better.     Lungs, bilateral air entry  Cor, RRR                 < from: CT Angio Chest w/ IV Cont (08.28.19 @ 05:30) >    LUNGS AND AIRWAYS: Patent central airways. No pulmonary nodules, masses  or consolidation. Centrilobular emphysema. Subsegmental, dependent   atelectasis. Subsegmental atelectasis.    PLEURA: No pleural effusion.    MEDIASTINUM AND MITCH: No lymphadenopathy.    VESSELS: No pulmonary embolism.    HEART: Heart size is enlarged. No pericardial effusion. Mild coronary   artery calcifications.    CHEST WALL AND LOWER NECK: Within normal limits.    VISUALIZED UPPER ABDOMEN: Status post cholecystectomy. Pneumobilia.    BONES: Mild degenerative changes of the spine.    IMPRESSION:     No pulmonary embolism.    < end of copied text >    < from: Nuclear Stress Test-Pharmacologic (08.29.19 @ 10:04) >    * There is a small, predominantly fixed, mild intensity  defect in the basal inferior wall that thickens, most  consistent with attenuation artifact.  * Post-stress resting myocardial perfusion gated SPECT  imaging was performed (LVEF = 64 %;LVEDV = 105 ml.)  * Negative study for significant reversible ischemia    < end of copied text >    IMP:  Chest pain, positive troponins; no evidence of reversible ischemia on stress test              Centrilobular emphysema, recurrent chest pain is possibly related to rupture of small blebs.   Asymptomatic bacteriuria  Plan:  Pulmonary consultation           Reassurance, given           Possible discharge home.  F/u PMD on Tuesday.  Patient has appointment
Patient was examined and discussed with Dr. Wilde.   Recurrent episodes of sharp chest pain, which do not correlate with exertion.     Alert woman, in NAD  Vital Signs Last 24 Hrs  T(C): 36.7 (29 Aug 2019 15:57), Max: 37 (28 Aug 2019 22:17)  T(F): 98 (29 Aug 2019 15:57), Max: 98.6 (28 Aug 2019 22:17)  HR: 65 (29 Aug 2019 15:57) (60 - 75)  BP: 139/58 (29 Aug 2019 15:57) (127/65 - 161/66)  BP(mean): --  RR: 18 (29 Aug 2019 15:57) (18 - 18)  SpO2: 96% (29 Aug 2019 15:57) (94% - 99%)  Lungs, bilateral air entry  Cor, RRR                        11.3   6.41  )-----------( 192      ( 29 Aug 2019 07:24 )             36.1   08-29    139  |  105  |  6<L>  ----------------------------<  99  3.8   |  29  |  0.60    Ca    8.4      29 Aug 2019 07:24  Phos  2.3     08-29  Mg     2.2     08-29    TPro  6.3  /  Alb  2.8<L>  /  TBili  1.6<H>  /  DBili  x   /  AST  37  /  ALT  30  /  AlkPhos  56  08-28  Troponin I, Serum: 4.180    < from: CT Angio Chest w/ IV Cont (08.28.19 @ 05:30) >    LUNGS AND AIRWAYS: Patent central airways. No pulmonary nodules, masses  or consolidation. Centrilobular emphysema. Subsegmental, dependent   atelectasis. Subsegmental atelectasis.    PLEURA: No pleural effusion.    MEDIASTINUM AND MITCH: No lymphadenopathy.    VESSELS: No pulmonary embolism.    HEART: Heart size is enlarged. No pericardial effusion. Mild coronary   artery calcifications.    CHEST WALL AND LOWER NECK: Within normal limits.    VISUALIZED UPPER ABDOMEN: Status post cholecystectomy. Pneumobilia.    BONES: Mild degenerative changes of the spine.    IMPRESSION:     No pulmonary embolism.    < end of copied text >    < from: Nuclear Stress Test-Pharmacologic (08.29.19 @ 10:04) >    * There is a small, predominantly fixed, mild intensity  defect in the basal inferior wall that thickens, most  consistent with attenuation artifact.  * Post-stress resting myocardial perfusion gated SPECT  imaging was performed (LVEF = 64 %;LVEDV = 105 ml.)  * Negative study for significant reversible ischemia    < end of copied text >    IMP:  Chest pain, positive troponins; no evidence of reversible ischemia on stress test              Centrilobular emphysema, recurrent chest pain is possibly related to rupture of small blebs.   Plan:  Pulmonary consultation           Reassurance, given           Possible discharge in AM.
Patient was examined at the bedside and discussed with Dr. Wilde.     Patient states she had pain yesterday, but not today.   '  Vital Signs Last 24 Hrs  T(C): 36.9 (28 Aug 2019 15:52), Max: 39.7 (28 Aug 2019 01:40)  T(F): 98.4 (28 Aug 2019 15:52), Max: 103.4 (28 Aug 2019 01:40)  HR: 64 (28 Aug 2019 15:52) (58 - 89)  BP: 111/64 (28 Aug 2019 15:52) (90/47 - 144/67)  BP(mean): --  RR: 18 (28 Aug 2019 15:52) (12 - 22)  SpO2: 94% (28 Aug 2019 15:52) (91% - 98%)  Lungs, clear  Cor, RRR  Abdomen, mild epigastric tenderness    EKG, as above  preliminary echo shows mild-moderate AI; EF 50%                          10.7   7.24  )-----------( 164      ( 28 Aug 2019 07:53 )             33.9           140  |  109<H>  |  9   ----------------------------<  110<H>  3.6   |  27  |  0.68    Ca    7.5<L>      28 Aug 2019 07:53  Phos  3.0       Mg     1.9         TPro  6.3  /  Alb  2.8<L>  /  TBili  1.6<H>  /  DBili  x   /  AST  37  /  ALT  30  /  AlkPhos  56                Urinalysis Basic - ( 28 Aug 2019 03:40 )    Color: Yellow / Appearance: Clear / S.005 / pH: x  Gluc: x / Ketone: Negative  / Bili: Negative / Urobili: Negative   Blood: x / Protein: 30 mg/dL / Nitrite: Negative   Leuk Esterase: Small / RBC: 5-10 /HPF / WBC 3-5 /HPF   Sq Epi: x / Non Sq Epi: Few /HPF / Bacteria: Moderate /HPF        PT/INR - ( 28 Aug 2019 02:29 )   PT: 15.3 sec;   INR: 1.37 ratio         PTT - ( 28 Aug 2019 02:29 )  PTT:25.2 sec    Lactate Trend   @ 02:29 Lactate:1.1       CARDIAC MARKERS ( 28 Aug 2019 14:23 )  5.890 ng/mL / x     / 217 U/L / x     / 9.3 ng/mL  CARDIAC MARKERS ( 28 Aug 2019 07:53 )  4.530 ng/mL / x     / x     / x     / x      CARDIAC MARKERS ( 28 Aug 2019 02:29 )  0.131 ng/mL / x     / x     / x     / x        IMP:  Clinical course does not correlate with ACS, though troponins are elevated.          possible gastritis  Plan: Continue tele. ASA, Cardiology f/u

## 2019-08-30 NOTE — PROGRESS NOTE ADULT - ASSESSMENT
Patient is 83 year old female has past medical hx significant for celiac disease was brought in by daughter after pt was shaking last night.     Per daughter pt was in her usual health until last night when she woke up at midnight pt started to have shaking shivering pt was brought in to ED pt was code sepsis for fever of 103, pt had CT angio chest which was neg for PE, pt was given Rocephin and Zithromax for concern for pneumonia.   pt is very poor historian complaining of dry cough for a week, epigastric pain for several days, severe pain, non radiating, improves with rubbing, unable to provide much information, per daughter they were told pt has acid reflux, pt is having dizziness and upper back pain. Pt denies headache, sob, palpitation, abdominal pain, n/v/d, hematuria, dysuria hemoptysis, rash.
Assessment:  - CT of the chest showing: Centrilobular emphysema. Subsegmental, dependent atelectasis. with subsegmental atelectasis. 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 Patient currently asymptomatic at this time.   - Acute Viral prodome.   - Cardiac work up done: Echocardiogram showing Normal Left Ventricular Systolic Function. NST Negative study for significant reversible ischemia    Recommendations:  - Patient clinically better and currently asymptomatic.   - Can complete a short course of Abx to complete for 5 days   - Follow up with her PCP upon discharge   - No need for any additional inhalers at this time since the patient has not required them during the course of admission. Recommend symptomatic support from Pulmonary standpoint.
Patient is 83 year old female has past medical hx significant for celiac disease was brought in by daughter after pt was shaking last night.     Per daughter pt was in her usual health until last night when she woke up at midnight pt started to have shaking shivering pt was brought in to ED pt was code sepsis for fever of 103, pt had CT angio chest which was neg for PE, pt was given Rocephin and Zithromax for concern for pneumonia.   pt is very poor historian complaining of dry cough for a week, epigastric pain for several days, severe pain, non radiating, improves with rubbing, unable to provide much information, per daughter they were told pt has acid reflux, pt is having dizziness and upper back pain. Pt denies headache, sob, palpitation, abdominal pain, n/v/d, hematuria, dysuria hemoptysis, rash.
Patient is 83 year old female has past medical hx significant for celiac disease was brought in by daughter after pt was shaking last night.     Per daughter pt was in her usual health until last night when she woke up at midnight pt started to have shaking shivering pt was brought in to ED pt was code sepsis for fever of 103, pt had CT angio chest which was neg for PE, pt was given Rocephin and Zithromax for concern for pneumonia.   pt is very poor historian complaining of dry cough for a week, epigastric pain for several days, severe pain, non radiating, improves with rubbing, unable to provide much information, per daughter they were told pt has acid reflux, pt is having dizziness and upper back pain. Pt denies headache, sob, palpitation, abdominal pain, n/v/d, hematuria, dysuria hemoptysis, rash.

## 2019-08-30 NOTE — PROGRESS NOTE ADULT - PROBLEM SELECTOR PLAN 1
pt came in with fever of 103, RR 22 meets sirs criteria  CT angio chest neg for consolidation, PE  U/A only positive for bacteria, no wbc count, pt does not have urinary symptoms   since pt has cough will start empiric rocephin and zithromax  f/u blood culture: neg  []FU UC  s/p 2.8 L ivf c/w ns at 75 cc/hr  [x] FU pulm as per attending: no further recommendation

## 2019-08-30 NOTE — PROGRESS NOTE ADULT - SUBJECTIVE AND OBJECTIVE BOX
==================PULMONOLOGY NOTE===================   Discussed with primary attending    ================CHIEF COMPLAINT===============  Patient is a 84y old  Female who presents with a chief complaint of Sepsis/ epigastric pain (29 Aug 2019 16:12)        =========INTERVAL HPI/OVERNIGHT EVENTS=========  Offers no new complaints      ============CURRENT MEDICATIONS===============    MEDICATIONS  (STANDING):  aspirin enteric coated 81 milliGRAM(s) Oral daily  atorvastatin 40 milliGRAM(s) Oral at bedtime  azithromycin  IVPB 500 milliGRAM(s) IV Intermittent every 24 hours  cefTRIAXone   IVPB 1000 milliGRAM(s) IV Intermittent every 24 hours  ergocalciferol 73642 Unit(s) Oral <User Schedule>  heparin  Injectable 5000 Unit(s) SubCutaneous every 12 hours  pantoprazole    Tablet 40 milliGRAM(s) Oral before breakfast  sodium chloride 0.9%. 1000 milliLiter(s) (75 mL/Hr) IV Continuous <Continuous>    MEDICATIONS  (PRN):  aluminum hydroxide/magnesium hydroxide/simethicone Suspension 30 milliLiter(s) Oral every 4 hours PRN Dyspepsia        ============REVIEW OF SYSTEMS==================    CONSTITUTIONAL: No fever  EYES: no acute visual disturbances  NECK: No pain or stiffness  RESPIRATORY: No cough; No shortness of breath  CARDIOVASCULAR: No chest pain, no palpitations  GASTROINTESTINAL: No pain. No nausea or vomiting; No diarrhea   NEUROLOGICAL: No headache or numbness, no tremors  MUSCULOSKELETAL: No joint pain, no muscle pain  GENITOURINARY: no dysuria, no frequency, no hesitancy  PSYCHIATRY: no depression , no anxiety  ALL OTHER  ROS negative      ================VITALS SIGNS=====================  Vital Signs Last 24 Hrs  T(C): 36.9 (30 Aug 2019 04:52), Max: 37.1 (29 Aug 2019 20:21)  T(F): 98.4 (30 Aug 2019 04:52), Max: 98.7 (29 Aug 2019 20:21)  HR: 78 (30 Aug 2019 04:52) (65 - 78)  BP: 157/78 (30 Aug 2019 04:52) (137/57 - 157/78)  BP(mean): --  RR: 19 (30 Aug 2019 04:52) (18 - 19)  SpO2: 97% (30 Aug 2019 04:52) (96% - 100%)    ===============PHYSICAL EXAM====================    GENERAL: NAD  HEENT: Normocephalic;  conjunctivae and sclerae clear; moist mucous membranes;   NECK : supple  CHEST/LUNG: Clear to auscultation bilaterally with good air entry   HEART: S1 S2  regular; no murmurs, gallops or rubs  ABDOMEN: Soft, Nontender, Nondistended; Bowel sounds present  EXTREMITIES: no cyanosis; no edema; no calf tenderness  SKIN: warm and dry; no rash  NERVOUS SYSTEM:  Awake and alert; Oriented  to place, person and time    ==============LABORATORIES======================  LABS:                        11.7   5.32  )-----------( 202      ( 30 Aug 2019 07:45 )             36.4     08-30    141  |  104  |  5<L>  ----------------------------<  93  3.4<L>   |  32<H>  |  0.62    Ca    9.1      30 Aug 2019 07:45  Phos  3.1     08-30  Mg     2.3     08-30          CAPILLARY BLOOD GLUCOSE          =============INPUTS/OUPUTS=====================    08-29-19 @ 07:01  -  08-30-19 @ 07:00  --------------------------------------------------------  IN: 650 mL / OUT: 690 mL / NET: -40 mL          RADIOLOGY & ADDITIONAL TESTS:    Imaging Personally Reviewed:  YES    Consultant(s) Notes Reviewed:   YES    Care Discussed with Consultants : YES    Plan of care was discussed with patient  and /or primary care giver; all questions and concerns were addressed and care was aligned with patient's wishes. Time was allowed for questions that were answered to the best of my abilities

## 2019-08-30 NOTE — DISCHARGE NOTE PROVIDER - NSDCCPCAREPLAN_GEN_ALL_CORE_FT
PRINCIPAL DISCHARGE DIAGNOSIS  Diagnosis: Elevated troponin  Assessment and Plan of Treatment: YOu presented non cardiac chest pain with Elevated troponin. Full dose apisin was given, lipitor started, betablocker was heldsince bp is running on lower side, heart ultrasound , non sttress test was normal. Follow up with Primary Care Physician within one week after discharge to inform of your recent hospitalization. Patient/Family was oriented on instruction.        SECONDARY DISCHARGE DIAGNOSES  Diagnosis: UTI (urinary tract infection)  Assessment and Plan of Treatment: You presented with asymptomatic bacteriuira , no need of treatment . Follow up with Primary Care Physician within one week after discharge to inform of your recent hospitalization. Patient/Family was oriented on instruction.      Diagnosis: Hypoxia  Assessment and Plan of Treatment:     Diagnosis: Epigastric abdominal pain  Assessment and Plan of Treatment: Epigastric abdominal pain

## 2019-08-30 NOTE — PROGRESS NOTE ADULT - SUBJECTIVE AND OBJECTIVE BOX
MEDICAL ATTENDING NOTE  Patient is a 84y old  Female who presents with a chief complaint of Sepsis/ epigastric pain (30 Aug 2019 10:15)      HPI:  Patient is 83 year old female has past medical hx significant for celiac disease was brought in by daughter after pt was shaking last night.     Per daughter pt was in her usual health until last night when she woke up at midnight pt started to have shaking shivering pt was brought in to ED pt was code sepsis for fever of 103, pt had CT angio chest which was neg for PE, pt was given Rocephin and Zithromax for concern for pneumonia.   pt is very poor historian complaining of dry cough for a week, epigastric pain for several days, severe pain, non radiating, unable to provide much information, per daughter they were told pt has acid reflux, pt is having dizziness and upper back pain. Pt denies headache, sob, palpitation, abdominal pain, n/v/d, hematuria, dysuria hemoptysis, rash. (28 Aug 2019 06:23)      INTERVAL HPI/OVERNIGHT EVENTS: no new complaints    MEDICATIONS  (STANDING):  aspirin enteric coated 81 milliGRAM(s) Oral daily  atorvastatin 40 milliGRAM(s) Oral at bedtime  azithromycin  IVPB 500 milliGRAM(s) IV Intermittent every 24 hours  cefTRIAXone   IVPB 1000 milliGRAM(s) IV Intermittent every 24 hours  ergocalciferol 63859 Unit(s) Oral <User Schedule>  heparin  Injectable 5000 Unit(s) SubCutaneous every 12 hours  pantoprazole    Tablet 40 milliGRAM(s) Oral before breakfast  sodium chloride 0.9%. 1000 milliLiter(s) (75 mL/Hr) IV Continuous <Continuous>    MEDICATIONS  (PRN):  aluminum hydroxide/magnesium hydroxide/simethicone Suspension 30 milliLiter(s) Oral every 4 hours PRN Dyspepsia      __________________________________________________  REVIEW OF SYSTEMS:    CONSTITUTIONAL: No fever,   EYES: no acute visual disturbances  NECK: No pain or stiffness  RESPIRATORY: No cough; No shortness of breath  CARDIOVASCULAR: No chest pain, no palpitations  GASTROINTESTINAL: No pain. No nausea or vomiting; No diarrhea   NEUROLOGICAL: No headache or numbness, no tremors  MUSCULOSKELETAL: No joint pain, no muscle pain  GENITOURINARY: no dysuria, no frequency, no hesitancy  PSYCHIATRY: no depression , no anxiety  ALL OTHER  ROS negative        Vital Signs Last 24 Hrs  T(C): 36.9 (30 Aug 2019 04:52), Max: 37.1 (29 Aug 2019 20:21)  T(F): 98.4 (30 Aug 2019 04:52), Max: 98.7 (29 Aug 2019 20:21)  HR: 78 (30 Aug 2019 04:52) (65 - 78)  BP: 157/78 (30 Aug 2019 04:52) (137/57 - 157/78)  BP(mean): --  RR: 19 (30 Aug 2019 04:52) (18 - 19)  SpO2: 97% (30 Aug 2019 04:52) (96% - 100%)    ________________________________________________  PHYSICAL EXAM:  GENERAL: NAD  HEENT: Normocephalic;  conjunctivae and sclerae clear; moist mucous membranes;   NECK : supple  CHEST/LUNG: Clear to auscultation bilaterally with good air entry   HEART: S1 S2  regular; no murmurs, gallops or rubs  ABDOMEN: Soft, Nontender, Nondistended; Bowel sounds present  EXTREMITIES: no cyanosis; no edema; no calf tenderness  SKIN: warm and dry; no rash  NERVOUS SYSTEM:  Awake and alert; Oriented  to place, person and time ; no new deficits    _________________________________________________  LABS:                        11.7   5.32  )-----------( 202      ( 30 Aug 2019 07:45 )             36.4     08-30    141  |  104  |  5<L>  ----------------------------<  93  3.4<L>   |  32<H>  |  0.62    Ca    9.1      30 Aug 2019 07:45  Phos  3.1     08-30  Mg     2.3     08-30

## 2019-08-30 NOTE — DISCHARGE NOTE PROVIDER - HOSPITAL COURSE
Background  and course of admission:    HPI:    Patient is 83 year old female has past medical hx significant for celiac disease was brought in by daughter after pt was shaking last night.         Per daughter pt was in her usual health until last night when she woke up at midnight pt started to have shaking shivering pt was brought in to ED pt was code sepsis for fever of 103, pt had CT angio chest which was neg for PE, pt was given Rocephin and Zithromax for concern for pneumonia.     pt is very poor historian complaining of dry cough for a week, epigastric pain for several days, severe pain, non radiating, unable to provide much information, per daughter they were told pt has acid reflux, pt is having dizziness and upper back pain. Pt denies headache, sob, palpitation, abdominal pain, n/v/d, hematuria, dysuria hemoptysis, rash. (28 Aug 2019 06:23)        PT came in with fever of 103, RR 22 met sirs criteria, CT angio chest neg for consolidation and PE, U/A only positive for bacteria, no wbc count, pt does not have urinary symptoms. Since pt has cough will start empiric rocephin and zithromax,  blood cultur was neg, UC come back positive, UC positive for enterococcus. Asymptomatic bacteriuria.         For non cardiac chest pain with Elevated troponin.   Tn trend it down 0: T2: 4.5-->T3:5.8-->4.1full dose apisin was given, lipitor started, bb was heldsince bp is running on lower side    echocardiogram. NST test perfomed , no ischemia or MI    will perform NST 8/29 : neg.         Given patient's improved clinical status and current hemodynamic stability, decision was made to discharge.    Please refer to patient's complete medical chart with documents for a full hospital course, for this is only a brief summary.

## 2019-08-30 NOTE — PROGRESS NOTE ADULT - PROBLEM SELECTOR PLAN 2
pt is having epigastric pain, improves with rubbing- atypical pain   ekg NSR, trop elevated 0.131 will trend it down 0: T2: 4.5-->T3:5.8-->4.1  full dose apisin was given, lipitor started,  will hold bb since bp is running on lower side  echocardiogram, lipid panel : LDL:50, hba1c: 5.7  tele monitoring  cardiology evaluation: Antonios NST  will perform NST 8/29 : neg

## 2019-08-31 LAB
-  AMIKACIN: SIGNIFICANT CHANGE UP
-  AZTREONAM: SIGNIFICANT CHANGE UP
-  CEFEPIME: SIGNIFICANT CHANGE UP
-  CEFTAZIDIME: SIGNIFICANT CHANGE UP
-  CIPROFLOXACIN: SIGNIFICANT CHANGE UP
-  GENTAMICIN: SIGNIFICANT CHANGE UP
-  IMIPENEM: SIGNIFICANT CHANGE UP
-  LEVOFLOXACIN: SIGNIFICANT CHANGE UP
-  MEROPENEM: SIGNIFICANT CHANGE UP
-  PIPERACILLIN/TAZOBACTAM: SIGNIFICANT CHANGE UP
-  TOBRAMYCIN: SIGNIFICANT CHANGE UP
METHOD TYPE: SIGNIFICANT CHANGE UP

## 2019-09-01 LAB
-  AMPICILLIN: SIGNIFICANT CHANGE UP
-  CIPROFLOXACIN: SIGNIFICANT CHANGE UP
-  LEVOFLOXACIN: SIGNIFICANT CHANGE UP
-  NITROFURANTOIN: SIGNIFICANT CHANGE UP
-  TETRACYCLINE: SIGNIFICANT CHANGE UP
-  VANCOMYCIN: SIGNIFICANT CHANGE UP
CULTURE RESULTS: SIGNIFICANT CHANGE UP
METHOD TYPE: SIGNIFICANT CHANGE UP
ORGANISM # SPEC MICROSCOPIC CNT: SIGNIFICANT CHANGE UP
SPECIMEN SOURCE: SIGNIFICANT CHANGE UP

## 2019-09-02 LAB
CULTURE RESULTS: SIGNIFICANT CHANGE UP
CULTURE RESULTS: SIGNIFICANT CHANGE UP
SPECIMEN SOURCE: SIGNIFICANT CHANGE UP
SPECIMEN SOURCE: SIGNIFICANT CHANGE UP

## 2019-09-19 PROCEDURE — 83880 ASSAY OF NATRIURETIC PEPTIDE: CPT

## 2019-09-19 PROCEDURE — 96365 THER/PROPH/DIAG IV INF INIT: CPT

## 2019-09-19 PROCEDURE — 83036 HEMOGLOBIN GLYCOSYLATED A1C: CPT

## 2019-09-19 PROCEDURE — 93005 ELECTROCARDIOGRAM TRACING: CPT

## 2019-09-19 PROCEDURE — 82550 ASSAY OF CK (CPK): CPT

## 2019-09-19 PROCEDURE — 87186 SC STD MICRODIL/AGAR DIL: CPT

## 2019-09-19 PROCEDURE — 80061 LIPID PANEL: CPT

## 2019-09-19 PROCEDURE — 85610 PROTHROMBIN TIME: CPT

## 2019-09-19 PROCEDURE — 81001 URINALYSIS AUTO W/SCOPE: CPT

## 2019-09-19 PROCEDURE — 96367 TX/PROPH/DG ADDL SEQ IV INF: CPT

## 2019-09-19 PROCEDURE — 99285 EMERGENCY DEPT VISIT HI MDM: CPT | Mod: 25

## 2019-09-19 PROCEDURE — 87086 URINE CULTURE/COLONY COUNT: CPT

## 2019-09-19 PROCEDURE — 84484 ASSAY OF TROPONIN QUANT: CPT

## 2019-09-19 PROCEDURE — 83735 ASSAY OF MAGNESIUM: CPT

## 2019-09-19 PROCEDURE — A9502: CPT

## 2019-09-19 PROCEDURE — 87040 BLOOD CULTURE FOR BACTERIA: CPT

## 2019-09-19 PROCEDURE — 36415 COLL VENOUS BLD VENIPUNCTURE: CPT

## 2019-09-19 PROCEDURE — 93306 TTE W/DOPPLER COMPLETE: CPT

## 2019-09-19 PROCEDURE — 80048 BASIC METABOLIC PNL TOTAL CA: CPT

## 2019-09-19 PROCEDURE — 78452 HT MUSCLE IMAGE SPECT MULT: CPT

## 2019-09-19 PROCEDURE — 82607 VITAMIN B-12: CPT

## 2019-09-19 PROCEDURE — 82553 CREATINE MB FRACTION: CPT

## 2019-09-19 PROCEDURE — 80053 COMPREHEN METABOLIC PANEL: CPT

## 2019-09-19 PROCEDURE — 82306 VITAMIN D 25 HYDROXY: CPT

## 2019-09-19 PROCEDURE — 96375 TX/PRO/DX INJ NEW DRUG ADDON: CPT

## 2019-09-19 PROCEDURE — 93017 CV STRESS TEST TRACING ONLY: CPT

## 2019-09-19 PROCEDURE — 85027 COMPLETE CBC AUTOMATED: CPT

## 2019-09-19 PROCEDURE — 84443 ASSAY THYROID STIM HORMONE: CPT

## 2019-09-19 PROCEDURE — 84100 ASSAY OF PHOSPHORUS: CPT

## 2019-09-19 PROCEDURE — 71045 X-RAY EXAM CHEST 1 VIEW: CPT

## 2019-09-19 PROCEDURE — 85730 THROMBOPLASTIN TIME PARTIAL: CPT

## 2019-09-19 PROCEDURE — 83605 ASSAY OF LACTIC ACID: CPT

## 2019-09-19 PROCEDURE — 96366 THER/PROPH/DIAG IV INF ADDON: CPT

## 2019-09-19 PROCEDURE — 71275 CT ANGIOGRAPHY CHEST: CPT

## 2020-02-10 NOTE — ED ADULT NURSE NOTE - NS ED NOTE  TALK SOMEONE YN
Activities as tolerated/Monitor weight daily/Report signs and symptoms to primary care provider/Call primary care provider for follow up after discharge/Low salt diet
no

## 2020-03-13 ENCOUNTER — EMERGENCY (EMERGENCY)
Facility: HOSPITAL | Age: 85
LOS: 1 days | Discharge: HOME | End: 2020-03-13
Attending: EMERGENCY MEDICINE
Payer: MEDICARE

## 2020-03-13 VITALS
TEMPERATURE: 103 F | OXYGEN SATURATION: 95 % | HEART RATE: 88 BPM | RESPIRATION RATE: 17 BRPM | SYSTOLIC BLOOD PRESSURE: 164 MMHG | DIASTOLIC BLOOD PRESSURE: 60 MMHG | HEIGHT: 61 IN | WEIGHT: 128.97 LBS

## 2020-03-13 VITALS
SYSTOLIC BLOOD PRESSURE: 132 MMHG | DIASTOLIC BLOOD PRESSURE: 78 MMHG | HEART RATE: 89 BPM | OXYGEN SATURATION: 96 % | TEMPERATURE: 99 F | RESPIRATION RATE: 18 BRPM

## 2020-03-13 DIAGNOSIS — Z98.890 OTHER SPECIFIED POSTPROCEDURAL STATES: Chronic | ICD-10-CM

## 2020-03-13 LAB
ALBUMIN SERPL ELPH-MCNC: 3.8 G/DL — SIGNIFICANT CHANGE UP (ref 3.5–5)
ALP SERPL-CCNC: 80 U/L — SIGNIFICANT CHANGE UP (ref 40–120)
ALT FLD-CCNC: 67 U/L DA — HIGH (ref 10–60)
ANION GAP SERPL CALC-SCNC: 6 MMOL/L — SIGNIFICANT CHANGE UP (ref 5–17)
APPEARANCE UR: CLEAR — SIGNIFICANT CHANGE UP
APTT BLD: 31.4 SEC — SIGNIFICANT CHANGE UP (ref 27.5–36.3)
AST SERPL-CCNC: 62 U/L — HIGH (ref 10–40)
BACTERIA # UR AUTO: ABNORMAL /HPF
BASOPHILS # BLD AUTO: 0.06 K/UL — SIGNIFICANT CHANGE UP (ref 0–0.2)
BASOPHILS NFR BLD AUTO: 1 % — SIGNIFICANT CHANGE UP (ref 0–2)
BILIRUB SERPL-MCNC: 1.9 MG/DL — HIGH (ref 0.2–1.2)
BILIRUB UR-MCNC: NEGATIVE — SIGNIFICANT CHANGE UP
BUN SERPL-MCNC: 9 MG/DL — SIGNIFICANT CHANGE UP (ref 7–18)
CALCIUM SERPL-MCNC: 9 MG/DL — SIGNIFICANT CHANGE UP (ref 8.4–10.5)
CHLORIDE SERPL-SCNC: 99 MMOL/L — SIGNIFICANT CHANGE UP (ref 96–108)
CO2 SERPL-SCNC: 28 MMOL/L — SIGNIFICANT CHANGE UP (ref 22–31)
COLOR SPEC: YELLOW — SIGNIFICANT CHANGE UP
COMMENT - URINE: SIGNIFICANT CHANGE UP
CREAT SERPL-MCNC: 0.76 MG/DL — SIGNIFICANT CHANGE UP (ref 0.5–1.3)
DIFF PNL FLD: ABNORMAL
EOSINOPHIL # BLD AUTO: 0 K/UL — SIGNIFICANT CHANGE UP (ref 0–0.5)
EOSINOPHIL NFR BLD AUTO: 0 % — SIGNIFICANT CHANGE UP (ref 0–6)
EPI CELLS # UR: SIGNIFICANT CHANGE UP /HPF
GLUCOSE SERPL-MCNC: 111 MG/DL — HIGH (ref 70–99)
GLUCOSE UR QL: NEGATIVE — SIGNIFICANT CHANGE UP
HCT VFR BLD CALC: 41.4 % — SIGNIFICANT CHANGE UP (ref 34.5–45)
HGB BLD-MCNC: 13.5 G/DL — SIGNIFICANT CHANGE UP (ref 11.5–15.5)
INR BLD: 1.35 RATIO — HIGH (ref 0.88–1.16)
KETONES UR-MCNC: ABNORMAL
LACTATE SERPL-SCNC: 1.5 MMOL/L — SIGNIFICANT CHANGE UP (ref 0.7–2)
LEUKOCYTE ESTERASE UR-ACNC: NEGATIVE — SIGNIFICANT CHANGE UP
LYMPHOCYTES # BLD AUTO: 0.82 K/UL — LOW (ref 1–3.3)
LYMPHOCYTES # BLD AUTO: 13 % — SIGNIFICANT CHANGE UP (ref 13–44)
MANUAL SMEAR VERIFICATION: SIGNIFICANT CHANGE UP
MCHC RBC-ENTMCNC: 26.9 PG — LOW (ref 27–34)
MCHC RBC-ENTMCNC: 32.6 GM/DL — SIGNIFICANT CHANGE UP (ref 32–36)
MCV RBC AUTO: 82.6 FL — SIGNIFICANT CHANGE UP (ref 80–100)
MONOCYTES # BLD AUTO: 0.7 K/UL — SIGNIFICANT CHANGE UP (ref 0–0.9)
MONOCYTES NFR BLD AUTO: 11 % — SIGNIFICANT CHANGE UP (ref 2–14)
NEUTROPHILS # BLD AUTO: 4.68 K/UL — SIGNIFICANT CHANGE UP (ref 1.8–7.4)
NEUTROPHILS NFR BLD AUTO: 74 % — SIGNIFICANT CHANGE UP (ref 43–77)
NITRITE UR-MCNC: NEGATIVE — SIGNIFICANT CHANGE UP
NRBC # BLD: 0 /100 — SIGNIFICANT CHANGE UP (ref 0–0)
PH UR: 7 — SIGNIFICANT CHANGE UP (ref 5–8)
PLAT MORPH BLD: NORMAL — SIGNIFICANT CHANGE UP
PLATELET # BLD AUTO: 205 K/UL — SIGNIFICANT CHANGE UP (ref 150–400)
POTASSIUM SERPL-MCNC: 3.8 MMOL/L — SIGNIFICANT CHANGE UP (ref 3.5–5.3)
POTASSIUM SERPL-SCNC: 3.8 MMOL/L — SIGNIFICANT CHANGE UP (ref 3.5–5.3)
PROT SERPL-MCNC: 8.8 G/DL — HIGH (ref 6–8.3)
PROT UR-MCNC: 15
PROTHROM AB SERPL-ACNC: 15.4 SEC — HIGH (ref 10–12.9)
RBC # BLD: 5.01 M/UL — SIGNIFICANT CHANGE UP (ref 3.8–5.2)
RBC # FLD: 14.9 % — HIGH (ref 10.3–14.5)
RBC BLD AUTO: NORMAL — SIGNIFICANT CHANGE UP
RBC CASTS # UR COMP ASSIST: ABNORMAL /HPF (ref 0–2)
SODIUM SERPL-SCNC: 133 MMOL/L — LOW (ref 135–145)
SP GR SPEC: 1.01 — SIGNIFICANT CHANGE UP (ref 1.01–1.02)
UROBILINOGEN FLD QL: NEGATIVE — SIGNIFICANT CHANGE UP
VARIANT LYMPHS # BLD: 1 % — SIGNIFICANT CHANGE UP (ref 0–6)
WBC # BLD: 6.32 K/UL — SIGNIFICANT CHANGE UP (ref 3.8–10.5)
WBC # FLD AUTO: 6.32 K/UL — SIGNIFICANT CHANGE UP (ref 3.8–10.5)
WBC UR QL: SIGNIFICANT CHANGE UP /HPF (ref 0–5)

## 2020-03-13 PROCEDURE — 85027 COMPLETE CBC AUTOMATED: CPT

## 2020-03-13 PROCEDURE — 85730 THROMBOPLASTIN TIME PARTIAL: CPT

## 2020-03-13 PROCEDURE — 96374 THER/PROPH/DIAG INJ IV PUSH: CPT

## 2020-03-13 PROCEDURE — 71045 X-RAY EXAM CHEST 1 VIEW: CPT

## 2020-03-13 PROCEDURE — 87086 URINE CULTURE/COLONY COUNT: CPT

## 2020-03-13 PROCEDURE — 71045 X-RAY EXAM CHEST 1 VIEW: CPT | Mod: 26

## 2020-03-13 PROCEDURE — 85610 PROTHROMBIN TIME: CPT

## 2020-03-13 PROCEDURE — 36415 COLL VENOUS BLD VENIPUNCTURE: CPT

## 2020-03-13 PROCEDURE — 81001 URINALYSIS AUTO W/SCOPE: CPT

## 2020-03-13 PROCEDURE — 80053 COMPREHEN METABOLIC PANEL: CPT

## 2020-03-13 PROCEDURE — 99284 EMERGENCY DEPT VISIT MOD MDM: CPT

## 2020-03-13 PROCEDURE — 99284 EMERGENCY DEPT VISIT MOD MDM: CPT | Mod: 25

## 2020-03-13 PROCEDURE — 83605 ASSAY OF LACTIC ACID: CPT

## 2020-03-13 PROCEDURE — 87040 BLOOD CULTURE FOR BACTERIA: CPT

## 2020-03-13 RX ORDER — ACETAMINOPHEN 500 MG
1 TABLET ORAL
Qty: 18 | Refills: 0
Start: 2020-03-13 | End: 2020-03-15

## 2020-03-13 RX ORDER — CEFTRIAXONE 500 MG/1
1000 INJECTION, POWDER, FOR SOLUTION INTRAMUSCULAR; INTRAVENOUS ONCE
Refills: 0 | Status: COMPLETED | OUTPATIENT
Start: 2020-03-13 | End: 2020-03-13

## 2020-03-13 RX ORDER — ACETAMINOPHEN 500 MG
650 TABLET ORAL ONCE
Refills: 0 | Status: COMPLETED | OUTPATIENT
Start: 2020-03-13 | End: 2020-03-13

## 2020-03-13 RX ORDER — SODIUM CHLORIDE 9 MG/ML
1800 INJECTION INTRAMUSCULAR; INTRAVENOUS; SUBCUTANEOUS ONCE
Refills: 0 | Status: COMPLETED | OUTPATIENT
Start: 2020-03-13 | End: 2020-03-13

## 2020-03-13 RX ADMIN — Medication 650 MILLIGRAM(S): at 20:36

## 2020-03-13 RX ADMIN — SODIUM CHLORIDE 1800 MILLILITER(S): 9 INJECTION INTRAMUSCULAR; INTRAVENOUS; SUBCUTANEOUS at 20:37

## 2020-03-13 RX ADMIN — CEFTRIAXONE 100 MILLIGRAM(S): 500 INJECTION, POWDER, FOR SOLUTION INTRAMUSCULAR; INTRAVENOUS at 20:37

## 2020-03-13 NOTE — ED PROVIDER NOTE - CLINICAL SUMMARY MEDICAL DECISION MAKING FREE TEXT BOX
pt comes in with fever x one day , no cough , no urinary complaints   labs wnl , nontoxic appealing   d/c home

## 2020-03-13 NOTE — ED PROVIDER NOTE - OBJECTIVE STATEMENT
84 year old female with PMHx of Celiac disease and PSHx of hernia repair presents to the ED with complaints of chills this morning and then feeling warm later on in the evening. Patient additionally states that she has been feeling generally weak and unwell today. Patient otherwise denies any cough, nausea, vomiting, diarrhea, and all other acute complaints. Patient reports that she is not currently taking any medications. NKDA.

## 2020-03-13 NOTE — ED PROVIDER NOTE - PATIENT PORTAL LINK FT
You can access the FollowMyHealth Patient Portal offered by [facility] by registering at the following website: [email or web address]. By joining [facility]’s FollowMyHealth portal, you will also be able to view your health information using other applications (apps) compatible with our system.

## 2020-03-15 LAB
CULTURE RESULTS: SIGNIFICANT CHANGE UP
SPECIMEN SOURCE: SIGNIFICANT CHANGE UP

## 2021-04-07 NOTE — PATIENT PROFILE ADULT - DO YOU FEEL UNSAFE AT HOME, WORK, OR SCHOOL?
What Is The Reason For Today's Visit?: History of Non-Melanoma Skin Cancer How Many Skin Cancers Have You Had?: one When Was Your Last Cancer Diagnosed?: 2006 no

## 2021-10-13 NOTE — ED PROVIDER NOTE - NS_EDPROVIDERDISPOUSERTYPE_ED_A_ED
Take an extra torsemide 20mg (1 tablet) tomorrow only 10/14/21 then resume prior dose of torsemide 20mg on Mondays, Wednesdays and Fridays    Limit your sodium intake to 600mg per meal and restrict your fluids to 64oz a day    Increase your physical activity at home   Scribe Attestation (For Scribes USE Only)... Attending Attestation (For Attendings USE Only).../Scribe Attestation (For Scribes USE Only)...

## 2022-02-03 NOTE — PROGRESS NOTE ADULT - PROBLEM SELECTOR PLAN 3
could be from atypical presentation of NSTEMI or gastritis   pt takes ibuprofen at home but only takes if needed  will start pantoprazole  pt denies dysphagia or odynophagia, hemoptysis or hematemesis
Normal muscle tone/strength

## 2022-07-15 ENCOUNTER — EMERGENCY (EMERGENCY)
Facility: HOSPITAL | Age: 87
LOS: 1 days | Discharge: HOME | End: 2022-07-15
Attending: EMERGENCY MEDICINE
Payer: MEDICARE

## 2022-07-15 VITALS
SYSTOLIC BLOOD PRESSURE: 120 MMHG | TEMPERATURE: 98 F | HEART RATE: 69 BPM | RESPIRATION RATE: 18 BRPM | OXYGEN SATURATION: 98 % | DIASTOLIC BLOOD PRESSURE: 74 MMHG

## 2022-07-15 VITALS
OXYGEN SATURATION: 98 % | SYSTOLIC BLOOD PRESSURE: 115 MMHG | HEART RATE: 68 BPM | TEMPERATURE: 99 F | WEIGHT: 108.25 LBS | DIASTOLIC BLOOD PRESSURE: 77 MMHG | HEIGHT: 61 IN | RESPIRATION RATE: 18 BRPM

## 2022-07-15 DIAGNOSIS — Z98.890 OTHER SPECIFIED POSTPROCEDURAL STATES: Chronic | ICD-10-CM

## 2022-07-15 LAB
ALBUMIN SERPL ELPH-MCNC: 3.4 G/DL — LOW (ref 3.5–5)
ALP SERPL-CCNC: 58 U/L — SIGNIFICANT CHANGE UP (ref 40–120)
ALT FLD-CCNC: 27 U/L DA — SIGNIFICANT CHANGE UP (ref 10–60)
ANION GAP SERPL CALC-SCNC: 9 MMOL/L — SIGNIFICANT CHANGE UP (ref 5–17)
APPEARANCE UR: CLEAR — SIGNIFICANT CHANGE UP
AST SERPL-CCNC: 27 U/L — SIGNIFICANT CHANGE UP (ref 10–40)
BASOPHILS # BLD AUTO: 0.05 K/UL — SIGNIFICANT CHANGE UP (ref 0–0.2)
BASOPHILS NFR BLD AUTO: 0.7 % — SIGNIFICANT CHANGE UP (ref 0–2)
BILIRUB SERPL-MCNC: 2 MG/DL — HIGH (ref 0.2–1.2)
BILIRUB UR-MCNC: NEGATIVE — SIGNIFICANT CHANGE UP
BUN SERPL-MCNC: 9 MG/DL — SIGNIFICANT CHANGE UP (ref 7–18)
CALCIUM SERPL-MCNC: 9.3 MG/DL — SIGNIFICANT CHANGE UP (ref 8.4–10.5)
CHLORIDE SERPL-SCNC: 102 MMOL/L — SIGNIFICANT CHANGE UP (ref 96–108)
CO2 SERPL-SCNC: 28 MMOL/L — SIGNIFICANT CHANGE UP (ref 22–31)
COLOR SPEC: YELLOW — SIGNIFICANT CHANGE UP
CREAT SERPL-MCNC: 0.78 MG/DL — SIGNIFICANT CHANGE UP (ref 0.5–1.3)
DIFF PNL FLD: NEGATIVE — SIGNIFICANT CHANGE UP
EGFR: 74 ML/MIN/1.73M2 — SIGNIFICANT CHANGE UP
EOSINOPHIL # BLD AUTO: 0.1 K/UL — SIGNIFICANT CHANGE UP (ref 0–0.5)
EOSINOPHIL NFR BLD AUTO: 1.5 % — SIGNIFICANT CHANGE UP (ref 0–6)
GLUCOSE SERPL-MCNC: 81 MG/DL — SIGNIFICANT CHANGE UP (ref 70–99)
GLUCOSE UR QL: NEGATIVE — SIGNIFICANT CHANGE UP
HCT VFR BLD CALC: 41.4 % — SIGNIFICANT CHANGE UP (ref 34.5–45)
HGB BLD-MCNC: 13.2 G/DL — SIGNIFICANT CHANGE UP (ref 11.5–15.5)
HIV 1 & 2 AB SERPL IA.RAPID: SIGNIFICANT CHANGE UP
IMM GRANULOCYTES NFR BLD AUTO: 0.1 % — SIGNIFICANT CHANGE UP (ref 0–1.5)
KETONES UR-MCNC: NEGATIVE — SIGNIFICANT CHANGE UP
LACTATE SERPL-SCNC: 0.8 MMOL/L — SIGNIFICANT CHANGE UP (ref 0.7–2)
LEUKOCYTE ESTERASE UR-ACNC: ABNORMAL
LIDOCAIN IGE QN: 144 U/L — SIGNIFICANT CHANGE UP (ref 73–393)
LYMPHOCYTES # BLD AUTO: 2.52 K/UL — SIGNIFICANT CHANGE UP (ref 1–3.3)
LYMPHOCYTES # BLD AUTO: 37.3 % — SIGNIFICANT CHANGE UP (ref 13–44)
MCHC RBC-ENTMCNC: 27 PG — SIGNIFICANT CHANGE UP (ref 27–34)
MCHC RBC-ENTMCNC: 31.9 GM/DL — LOW (ref 32–36)
MCV RBC AUTO: 84.7 FL — SIGNIFICANT CHANGE UP (ref 80–100)
MONOCYTES # BLD AUTO: 0.56 K/UL — SIGNIFICANT CHANGE UP (ref 0–0.9)
MONOCYTES NFR BLD AUTO: 8.3 % — SIGNIFICANT CHANGE UP (ref 2–14)
NEUTROPHILS # BLD AUTO: 3.51 K/UL — SIGNIFICANT CHANGE UP (ref 1.8–7.4)
NEUTROPHILS NFR BLD AUTO: 52.1 % — SIGNIFICANT CHANGE UP (ref 43–77)
NITRITE UR-MCNC: NEGATIVE — SIGNIFICANT CHANGE UP
NRBC # BLD: 0 /100 WBCS — SIGNIFICANT CHANGE UP (ref 0–0)
PH UR: 7 — SIGNIFICANT CHANGE UP (ref 5–8)
PLATELET # BLD AUTO: 253 K/UL — SIGNIFICANT CHANGE UP (ref 150–400)
POTASSIUM SERPL-MCNC: 4.1 MMOL/L — SIGNIFICANT CHANGE UP (ref 3.5–5.3)
POTASSIUM SERPL-SCNC: 4.1 MMOL/L — SIGNIFICANT CHANGE UP (ref 3.5–5.3)
PROT SERPL-MCNC: 8.3 G/DL — SIGNIFICANT CHANGE UP (ref 6–8.3)
PROT UR-MCNC: NEGATIVE — SIGNIFICANT CHANGE UP
RBC # BLD: 4.89 M/UL — SIGNIFICANT CHANGE UP (ref 3.8–5.2)
RBC # FLD: 14.9 % — HIGH (ref 10.3–14.5)
SARS-COV-2 RNA SPEC QL NAA+PROBE: SIGNIFICANT CHANGE UP
SODIUM SERPL-SCNC: 139 MMOL/L — SIGNIFICANT CHANGE UP (ref 135–145)
SP GR SPEC: 1 — LOW (ref 1.01–1.02)
TROPONIN I, HIGH SENSITIVITY RESULT: 24.4 NG/L — SIGNIFICANT CHANGE UP
UROBILINOGEN FLD QL: NEGATIVE — SIGNIFICANT CHANGE UP
WBC # BLD: 6.75 K/UL — SIGNIFICANT CHANGE UP (ref 3.8–10.5)
WBC # FLD AUTO: 6.75 K/UL — SIGNIFICANT CHANGE UP (ref 3.8–10.5)

## 2022-07-15 PROCEDURE — 99285 EMERGENCY DEPT VISIT HI MDM: CPT | Mod: 25

## 2022-07-15 PROCEDURE — 99285 EMERGENCY DEPT VISIT HI MDM: CPT

## 2022-07-15 PROCEDURE — 80053 COMPREHEN METABOLIC PANEL: CPT

## 2022-07-15 PROCEDURE — 83690 ASSAY OF LIPASE: CPT

## 2022-07-15 PROCEDURE — 87040 BLOOD CULTURE FOR BACTERIA: CPT

## 2022-07-15 PROCEDURE — 74177 CT ABD & PELVIS W/CONTRAST: CPT | Mod: MA

## 2022-07-15 PROCEDURE — 84484 ASSAY OF TROPONIN QUANT: CPT

## 2022-07-15 PROCEDURE — 93010 ELECTROCARDIOGRAM REPORT: CPT

## 2022-07-15 PROCEDURE — 36415 COLL VENOUS BLD VENIPUNCTURE: CPT

## 2022-07-15 PROCEDURE — 83605 ASSAY OF LACTIC ACID: CPT

## 2022-07-15 PROCEDURE — 87635 SARS-COV-2 COVID-19 AMP PRB: CPT

## 2022-07-15 PROCEDURE — 85025 COMPLETE CBC W/AUTO DIFF WBC: CPT

## 2022-07-15 PROCEDURE — 74177 CT ABD & PELVIS W/CONTRAST: CPT | Mod: 26,MA

## 2022-07-15 PROCEDURE — 86703 HIV-1/HIV-2 1 RESULT ANTBDY: CPT

## 2022-07-15 PROCEDURE — 87086 URINE CULTURE/COLONY COUNT: CPT

## 2022-07-15 PROCEDURE — 81001 URINALYSIS AUTO W/SCOPE: CPT

## 2022-07-15 RX ORDER — SODIUM CHLORIDE 9 MG/ML
1000 INJECTION INTRAMUSCULAR; INTRAVENOUS; SUBCUTANEOUS ONCE
Refills: 0 | Status: COMPLETED | OUTPATIENT
Start: 2022-07-15 | End: 2022-07-15

## 2022-07-15 RX ADMIN — SODIUM CHLORIDE 1000 MILLILITER(S): 9 INJECTION INTRAMUSCULAR; INTRAVENOUS; SUBCUTANEOUS at 14:22

## 2022-07-15 NOTE — ED PROVIDER NOTE - NSFOLLOWUPINSTRUCTIONS_ED_ALL_ED_FT
Gastritis - Adultos    Gastritis, Adult       La gastritis es danielle hinchazón (inflamación) del estómago. La gastritis puede desarrollarse rápidamente (aguda). También puede desarrollarse lentamente con el transcurso del tiempo (crónica). Es importante recibir ayuda para esta afección. Si no obtiene ayuda, el estómago puede sangrar y puede desarrollar llagas (úlceras) en el estómago.      ¿Cuáles son las causas?    Esta afección puede ser causada por lo siguiente:  •Gérmenes que llegan al estómago.      •Beber alcohol en exceso.      •Los medicamentos que magali.      •Demasiado ácido en el estómago.      •Danielle enfermedad del intestino o del estómago.      •Estrés.      •Danielle reacción alérgica.      •Enfermedad de Crohn.      •Algunos tratamientos para el cáncer (radiación).      A veces, se desconoce la causa de esta afección.      ¿Cuáles son los signos o los síntomas?    Los síntomas de esta afección incluyen los siguientes:  •Dolor en el estómago.      •Danielle sensación de ardor en el estómago.      •Ganas de vomitar (náuseas).      •Vómitos.      •Sensación de estar demasiado lleno luego de comer.      •Pérdida de peso.      •Mal aliento.      •Vomitar bert.      •Bert en las heces.        ¿Cómo se diagnostica?    Esta afección puede diagnosticarse en función de lo siguiente:  •Los antecedentes médicos y síntomas.      •Un examen físico.    •Estudios. Estos pueden incluir los siguientes:  •Análisis de bert.      •Pruebas de heces.      •Un procedimiento para observar el interior del estómago (endoscopía geni).      •Un estudio en el cual se magali danielle muestra de tejido para analizarlo (biopsia).          ¿Cómo se trata?    El tratamiento de esta afección depende de la causa. Es posible que le administren lo siguiente:  •Antibióticos si la afección es causada por gérmenes.      •Bloqueadores H2 y medicamentos similares, si la afección fue causada por danielle gran cantidad de ácido.        Siga estas indicaciones en petty casa:    Medicamentos     •[place] los medicamentos de venta ludivina y los recetados solamente latasha se lo haya indicado el médico.      •Si le recetaron un antibiótico, tómelo latasha se lo haya indicado el médico. No deje de tomarlo aunque comience a sentirse mejor.        Comida y bebida      •Mana comidas pequeñas y frecuentes en lugar de comidas abundantes.      •Evite los alimentos y las bebidas que intensifican los síntomas.      •Melany suficiente líquido para mantener la orina de color amarillo pálido.      Consumo de alcohol   • No melany alcohol si:  •El médico le indica que no lo mana.      •Está embarazada, puede estar embarazada o está tratando de quedar embarazada.      •Si chester alcohol:•Limite petty uso a las siguientes medidas:  •De 0 a 1 medida por día para las mujeres.      •De 0 a 2 medidas por día para los hombres.        •Esté atento a la cantidad de alcohol que hay en las bebidas que maglai. En [place], danielle medida equivale a danielle botella de cerveza de 12 oz (355 ml), un vaso de vino de 5 oz (148 ml) o un vaso de danielle bebida alcohólica de geni graduación de 1½ oz (44 ml).        Indicaciones generales     •[place] con el médico sobre maneras de controlar el estrés. Puede realizar ejercicios de respiración profunda, meditación o yoga.      • No fume ni consuma productos que contengan nicotina ni tabaco. Si necesita ayuda para dejar de fumar, consulte al médico.      •Concurra a todas las visitas de seguimiento latasha se lo haya indicado el médico. [place] es importante.        Comuníquese con un médico si:    •Marilyn síntomas empeoran.      •Los síntomas desaparecen y luego reaparecen.        Solicite ayuda inmediatamente si:    •Vomita bert o danielle sustancia parecida a los granos de café.      •La materia fecal es loren o de color slater oscuro.      •Vomita cada vez que intenta shoaib líquidos.      •El dolor de estómago empeora.      •Tiene fiebre.      •No se siente mejor luego de danielle semana.        Resumen    •La gastritis es danielle hinchazón (inflamación) del estómago.      •Debe obtener ayuda para tratar esta afección. Si no obtiene ayuda, el estómago puede sangrar y pueden formarse llagas (úlceras).      •Esta afección se diagnostica mediante los antecedentes médicos, un examen físico o estudios.      •Puede recibir tratamiento con medicamentos para los gérmenes o medicamentos para bloquear la presencia de demasiada cantidad de ácido en el estómago.      Esta información no tiene latasha fin reemplazar el consejo del médico. Asegúrese de hacerle al médico cualquier pregunta que tenga.      Document Revised: 06/27/2019 Document Reviewed: 06/27/2019    Elsevier Patient Education © 2022 [employer]

## 2022-07-15 NOTE — ED PROVIDER NOTE - PROGRESS NOTE DETAILS
Marshall:  Pt received in signout from Dr. Gavin to f/u CT A/P.  CT shows no acute pathology other than thickening of stomach wall.  Suggestive of gastritis.  Pt confirms h/o cholecystectomy but no h/o appendectomy.  On my exam, abdomen soft and mild epigastric TTP.  No RLQ tenderness or guarding.  Pt feels improved.  She takes omeprazole occasionally--I urged her to take it regularly as directed by her doctor and f/u with PMD and GI with return precautions.  Given copies of all results.  She will go home with her daughter.

## 2022-07-15 NOTE — ED ADULT NURSE NOTE - OBJECTIVE STATEMENT
Pt AOx4, ambulatory, c/o nausea and mid chest pain radiating to genral abdomen x 3 days. Pt denies hematuria, dysuria, SOB. EKG done, pt placed on cardiac monitor, no signs of distress noted.

## 2022-07-15 NOTE — ED PROVIDER NOTE - PATIENT PORTAL LINK FT
You can access the FollowMyHealth Patient Portal offered by [facility] by registering at the following website: [email or web address]. By joining [facility]’s FollowMyHealth portal, you will also be able to view your health information using other applications (apps) compatible with our system.

## 2022-07-16 LAB
CULTURE RESULTS: SIGNIFICANT CHANGE UP
SPECIMEN SOURCE: SIGNIFICANT CHANGE UP
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
## 2023-09-25 ENCOUNTER — INPATIENT (INPATIENT)
Facility: HOSPITAL | Age: 88
LOS: 6 days | Discharge: NURSING, REHAB OR LONG-TERM CARE | DRG: 91 | End: 2023-10-02
Attending: STUDENT IN AN ORGANIZED HEALTH CARE EDUCATION/TRAINING PROGRAM | Admitting: STUDENT IN AN ORGANIZED HEALTH CARE EDUCATION/TRAINING PROGRAM
Payer: MEDICARE

## 2023-09-25 VITALS
DIASTOLIC BLOOD PRESSURE: 67 MMHG | WEIGHT: 106.92 LBS | TEMPERATURE: 99 F | HEART RATE: 75 BPM | RESPIRATION RATE: 20 BRPM | OXYGEN SATURATION: 95 % | SYSTOLIC BLOOD PRESSURE: 161 MMHG

## 2023-09-25 DIAGNOSIS — R77.8 OTHER SPECIFIED ABNORMALITIES OF PLASMA PROTEINS: ICD-10-CM

## 2023-09-25 DIAGNOSIS — R41.82 ALTERED MENTAL STATUS, UNSPECIFIED: ICD-10-CM

## 2023-09-25 DIAGNOSIS — Z29.9 ENCOUNTER FOR PROPHYLACTIC MEASURES, UNSPECIFIED: ICD-10-CM

## 2023-09-25 DIAGNOSIS — Z98.890 OTHER SPECIFIED POSTPROCEDURAL STATES: Chronic | ICD-10-CM

## 2023-09-25 DIAGNOSIS — F03.90 UNSPECIFIED DEMENTIA WITHOUT BEHAVIORAL DISTURBANCE: ICD-10-CM

## 2023-09-25 DIAGNOSIS — E80.6 OTHER DISORDERS OF BILIRUBIN METABOLISM: ICD-10-CM

## 2023-09-25 LAB
ACETONE SERPL-MCNC: NEGATIVE — SIGNIFICANT CHANGE UP
ALBUMIN SERPL ELPH-MCNC: 3.4 G/DL — LOW (ref 3.5–5)
ALP SERPL-CCNC: 60 U/L — SIGNIFICANT CHANGE UP (ref 40–120)
ALT FLD-CCNC: 33 U/L DA — SIGNIFICANT CHANGE UP (ref 10–60)
ANION GAP SERPL CALC-SCNC: 7 MMOL/L — SIGNIFICANT CHANGE UP (ref 5–17)
APPEARANCE UR: CLEAR — SIGNIFICANT CHANGE UP
APTT BLD: 27.8 SEC — SIGNIFICANT CHANGE UP (ref 24.5–35.6)
AST SERPL-CCNC: 27 U/L — SIGNIFICANT CHANGE UP (ref 10–40)
BACTERIA # UR AUTO: ABNORMAL /HPF
BASOPHILS # BLD AUTO: 0.04 K/UL — SIGNIFICANT CHANGE UP (ref 0–0.2)
BASOPHILS NFR BLD AUTO: 0.4 % — SIGNIFICANT CHANGE UP (ref 0–2)
BILIRUB SERPL-MCNC: 2.5 MG/DL — HIGH (ref 0.2–1.2)
BILIRUB UR-MCNC: NEGATIVE — SIGNIFICANT CHANGE UP
BUN SERPL-MCNC: 9 MG/DL — SIGNIFICANT CHANGE UP (ref 7–18)
CALCIUM SERPL-MCNC: 8.9 MG/DL — SIGNIFICANT CHANGE UP (ref 8.4–10.5)
CHLORIDE SERPL-SCNC: 100 MMOL/L — SIGNIFICANT CHANGE UP (ref 96–108)
CO2 SERPL-SCNC: 27 MMOL/L — SIGNIFICANT CHANGE UP (ref 22–31)
COLOR SPEC: SIGNIFICANT CHANGE UP
COMMENT - URINE: SIGNIFICANT CHANGE UP
CREAT SERPL-MCNC: 0.76 MG/DL — SIGNIFICANT CHANGE UP (ref 0.5–1.3)
DIFF PNL FLD: NEGATIVE — SIGNIFICANT CHANGE UP
EGFR: 76 ML/MIN/1.73M2 — SIGNIFICANT CHANGE UP
EOSINOPHIL # BLD AUTO: 0.02 K/UL — SIGNIFICANT CHANGE UP (ref 0–0.5)
EOSINOPHIL NFR BLD AUTO: 0.2 % — SIGNIFICANT CHANGE UP (ref 0–6)
EPI CELLS # UR: PRESENT
GLUCOSE SERPL-MCNC: 115 MG/DL — HIGH (ref 70–99)
GLUCOSE UR QL: NEGATIVE MG/DL — SIGNIFICANT CHANGE UP
HCT VFR BLD CALC: 41.5 % — SIGNIFICANT CHANGE UP (ref 34.5–45)
HGB BLD-MCNC: 13.3 G/DL — SIGNIFICANT CHANGE UP (ref 11.5–15.5)
HIV 1 & 2 AB SERPL IA.RAPID: SIGNIFICANT CHANGE UP
IMM GRANULOCYTES NFR BLD AUTO: 0.4 % — SIGNIFICANT CHANGE UP (ref 0–0.9)
INR BLD: 1.37 RATIO — HIGH (ref 0.85–1.18)
KETONES UR-MCNC: 15 MG/DL
LACTATE SERPL-SCNC: 1.4 MMOL/L — SIGNIFICANT CHANGE UP (ref 0.7–2)
LEUKOCYTE ESTERASE UR-ACNC: ABNORMAL
LYMPHOCYTES # BLD AUTO: 1.69 K/UL — SIGNIFICANT CHANGE UP (ref 1–3.3)
LYMPHOCYTES # BLD AUTO: 17.9 % — SIGNIFICANT CHANGE UP (ref 13–44)
MAGNESIUM SERPL-MCNC: 2.1 MG/DL — SIGNIFICANT CHANGE UP (ref 1.6–2.6)
MCHC RBC-ENTMCNC: 26.8 PG — LOW (ref 27–34)
MCHC RBC-ENTMCNC: 32 GM/DL — SIGNIFICANT CHANGE UP (ref 32–36)
MCV RBC AUTO: 83.5 FL — SIGNIFICANT CHANGE UP (ref 80–100)
MONOCYTES # BLD AUTO: 0.9 K/UL — SIGNIFICANT CHANGE UP (ref 0–0.9)
MONOCYTES NFR BLD AUTO: 9.5 % — SIGNIFICANT CHANGE UP (ref 2–14)
NEUTROPHILS # BLD AUTO: 6.77 K/UL — SIGNIFICANT CHANGE UP (ref 1.8–7.4)
NEUTROPHILS NFR BLD AUTO: 71.6 % — SIGNIFICANT CHANGE UP (ref 43–77)
NITRITE UR-MCNC: NEGATIVE — SIGNIFICANT CHANGE UP
NRBC # BLD: 0 /100 WBCS — SIGNIFICANT CHANGE UP (ref 0–0)
NT-PROBNP SERPL-SCNC: 523 PG/ML — HIGH (ref 0–450)
PH UR: 6.5 — SIGNIFICANT CHANGE UP (ref 5–8)
PLATELET # BLD AUTO: 227 K/UL — SIGNIFICANT CHANGE UP (ref 150–400)
POTASSIUM SERPL-MCNC: 4 MMOL/L — SIGNIFICANT CHANGE UP (ref 3.5–5.3)
POTASSIUM SERPL-SCNC: 4 MMOL/L — SIGNIFICANT CHANGE UP (ref 3.5–5.3)
PROT SERPL-MCNC: 8.1 G/DL — SIGNIFICANT CHANGE UP (ref 6–8.3)
PROT UR-MCNC: 30 MG/DL
PROTHROM AB SERPL-ACNC: 15.5 SEC — HIGH (ref 9.5–13)
RBC # BLD: 4.97 M/UL — SIGNIFICANT CHANGE UP (ref 3.8–5.2)
RBC # FLD: 15.1 % — HIGH (ref 10.3–14.5)
RBC CASTS # UR COMP ASSIST: 1 /HPF — SIGNIFICANT CHANGE UP (ref 0–4)
SODIUM SERPL-SCNC: 134 MMOL/L — LOW (ref 135–145)
SP GR SPEC: 1.02 — SIGNIFICANT CHANGE UP (ref 1–1.03)
TROPONIN I, HIGH SENSITIVITY RESULT: 100 NG/L — HIGH
TROPONIN I, HIGH SENSITIVITY RESULT: 112 NG/L — HIGH
UROBILINOGEN FLD QL: 1 MG/DL — SIGNIFICANT CHANGE UP (ref 0.2–1)
WBC # BLD: 9.46 K/UL — SIGNIFICANT CHANGE UP (ref 3.8–10.5)
WBC # FLD AUTO: 9.46 K/UL — SIGNIFICANT CHANGE UP (ref 3.8–10.5)
WBC UR QL: 6 /HPF — HIGH (ref 0–5)

## 2023-09-25 PROCEDURE — 93010 ELECTROCARDIOGRAM REPORT: CPT | Mod: 77

## 2023-09-25 PROCEDURE — 70486 CT MAXILLOFACIAL W/O DYE: CPT | Mod: 26

## 2023-09-25 PROCEDURE — 70450 CT HEAD/BRAIN W/O DYE: CPT | Mod: 26,MA

## 2023-09-25 PROCEDURE — 99222 1ST HOSP IP/OBS MODERATE 55: CPT | Mod: GC

## 2023-09-25 PROCEDURE — 99285 EMERGENCY DEPT VISIT HI MDM: CPT

## 2023-09-25 PROCEDURE — 71045 X-RAY EXAM CHEST 1 VIEW: CPT | Mod: 26

## 2023-09-25 PROCEDURE — 93010 ELECTROCARDIOGRAM REPORT: CPT

## 2023-09-25 RX ORDER — ACETAMINOPHEN 500 MG
650 TABLET ORAL EVERY 6 HOURS
Refills: 0 | Status: DISCONTINUED | OUTPATIENT
Start: 2023-09-25 | End: 2023-10-02

## 2023-09-25 RX ORDER — OLANZAPINE 15 MG/1
2.5 TABLET, FILM COATED ORAL ONCE
Refills: 0 | Status: COMPLETED | OUTPATIENT
Start: 2023-09-25 | End: 2023-09-25

## 2023-09-25 RX ORDER — ENOXAPARIN SODIUM 100 MG/ML
40 INJECTION SUBCUTANEOUS EVERY 24 HOURS
Refills: 0 | Status: DISCONTINUED | OUTPATIENT
Start: 2023-09-25 | End: 2023-10-02

## 2023-09-25 RX ORDER — SODIUM CHLORIDE 9 MG/ML
1500 INJECTION INTRAMUSCULAR; INTRAVENOUS; SUBCUTANEOUS ONCE
Refills: 0 | Status: COMPLETED | OUTPATIENT
Start: 2023-09-25 | End: 2023-09-25

## 2023-09-25 RX ORDER — ASPIRIN/CALCIUM CARB/MAGNESIUM 324 MG
324 TABLET ORAL ONCE
Refills: 0 | Status: COMPLETED | OUTPATIENT
Start: 2023-09-25 | End: 2023-09-25

## 2023-09-25 RX ORDER — ONDANSETRON 8 MG/1
4 TABLET, FILM COATED ORAL EVERY 8 HOURS
Refills: 0 | Status: DISCONTINUED | OUTPATIENT
Start: 2023-09-25 | End: 2023-10-02

## 2023-09-25 RX ORDER — LANOLIN ALCOHOL/MO/W.PET/CERES
3 CREAM (GRAM) TOPICAL AT BEDTIME
Refills: 0 | Status: DISCONTINUED | OUTPATIENT
Start: 2023-09-25 | End: 2023-10-02

## 2023-09-25 RX ORDER — OLANZAPINE 15 MG/1
5 TABLET, FILM COATED ORAL ONCE
Refills: 0 | Status: DISCONTINUED | OUTPATIENT
Start: 2023-09-25 | End: 2023-09-25

## 2023-09-25 RX ORDER — SODIUM CHLORIDE 9 MG/ML
1000 INJECTION, SOLUTION INTRAVENOUS
Refills: 0 | Status: DISCONTINUED | OUTPATIENT
Start: 2023-09-25 | End: 2023-10-02

## 2023-09-25 RX ADMIN — SODIUM CHLORIDE 1500 MILLILITER(S): 9 INJECTION INTRAMUSCULAR; INTRAVENOUS; SUBCUTANEOUS at 16:28

## 2023-09-25 RX ADMIN — Medication 324 MILLIGRAM(S): at 16:29

## 2023-09-25 RX ADMIN — OLANZAPINE 2.5 MILLIGRAM(S): 15 TABLET, FILM COATED ORAL at 20:07

## 2023-09-25 RX ADMIN — SODIUM CHLORIDE 70 MILLILITER(S): 9 INJECTION, SOLUTION INTRAVENOUS at 23:36

## 2023-09-25 NOTE — ED PROVIDER NOTE - IV ALTEPLASE INCLUSION HIDDEN
12/20/2019  Spoke to Giorgi moran for CCM. Updates to patient care team/ comments: UTD  Patient reported changes in medications: None  Med Adherence  Comment: Taking as directed    Health Maintenance: Reviewed with patient. Pt reports quitting smoking.  Narcisa balanced meals along with 20-30 minutes of daily exercise and stretching. Time Spent This Encounter Total: 35 min medical record review, telephone communication, care plan updates where needed, education, goals and action plan recreation/update.  Provide show

## 2023-09-25 NOTE — ED PROVIDER NOTE - OBJECTIVE STATEMENT
88-year-old female with history of celiac disease, not on any meds, dementia.  As per son, patient was placed in assisted living for the past 2 weeks.  Her other son removed her from assisted living yesterday.  Because he did not like the way the patient looked.  Son reported that patient with increased sleepiness, unable to ambulate, no appetite.  Patient denies any pain, N/V, chest pain, dizziness.  Son was told that patient was given Haldol 0.5 yesterday at the assisted living

## 2023-09-25 NOTE — ED ADULT NURSE NOTE - OBJECTIVE STATEMENT
BIBA from home c/o weakness, decreased appetitive. As per family, the Pt has hx of dementia, Pt was in an assisted living facility, Pt was getting "more confused" Pt where she received Haldol. Afterwards Pt "has not been acting like self." Family denies N/V and fever.

## 2023-09-25 NOTE — H&P ADULT - ASSESSMENT
Patient is a 89 yo F with a pmhx of dementia. Patient was brought in by son after having new onset altered mental status and agitation. On labs patient was found to have trops elevated and elevated BNP. EKG showed Anteroseptal infarct with new T wave inversions.  Patient is admitted for AMS and ACS r/o

## 2023-09-25 NOTE — ED PEDIATRIC NURSE REASSESSMENT NOTE - NS ED NURSE REASSESS COMMENT FT2
received pt uncooperative , not in respiratory distress, punching to caregivers and even family at bedside, IV and cardiac monitor pulled out by patient, situation escalated to resident ZEFERINO Nagel and KACI , requested 1:1 at bedside once calm pt has to go for head CT. Left:/chest, general

## 2023-09-25 NOTE — ED ADULT NURSE NOTE - NSFALLUNIVINTERV_ED_ALL_ED
Bed/Stretcher in lowest position, wheels locked, appropriate side rails in place/Call bell, personal items and telephone in reach/Instruct patient to call for assistance before getting out of bed/chair/stretcher/Non-slip footwear applied when patient is off stretcher/[place] to call system/Physically safe environment - no spills, clutter or unnecessary equipment/Purposeful proactive rounding/Room/bathroom lighting operational, light cord in reach

## 2023-09-25 NOTE — H&P ADULT - HISTORY OF PRESENT ILLNESS
Patient is a 87 yo F with a pmhx of dementia with a baseline of AAO 1-2. Brought in by family member after being found shaking and sleepy in the the [facility] patient has been for the past week in a half. Son at bedside states that after seen that the mother was confused, sleepy and shaking he took her home. Son also sates that noticed that the mom is weaker than usual and that patient usually walks independently and now patient needs assistance. Son also is stating that mother in the nursing home received one dose of haldol 0.5mg and thing that this has set her symptoms off. When interviewing the patient, she is unaware of why is on the hospital and does not have any complains.    Patient is a 87 yo F with a pmhx of dementia with a baseline of AAO 1-2. Brought in by family member after being found shaking and sleepy in the [facility] home patient has been for the past week and a half. Son at bedside states that after seeing that the mother was confused, sleepy and shaking he took her home. Son also sates that he noticed that the mom is weaker than usual and that patient usually walks independently and now patient needs assistance. Son also is stating that mother in the nursing home received one dose of haldol 0.5mg and thinks that this has set her symptoms off. When interviewing the patient, she is unaware of why she is in the hospital and does not have any complaints.

## 2023-09-25 NOTE — H&P ADULT - PROBLEM SELECTOR PLAN 1
-patient with new onset agitation and shaking after being in a nursing home   -son states that she is more weak than usual and unable to ambulate independently   -received haldol on the weekend on nursing home   -CTH negative  -no source of infection noted  -could be due to haldol given vs new environment -patient with new onset agitation and shaking after being in a nursing home   -son states that she is more weak than usual and unable to ambulate independently   -received haldol on the weekend on nursing home   -CTH negative  -no source of infection noted  -could be due to haldol given vs new environment  -PT ordered  -bedside S&S

## 2023-09-25 NOTE — ED PROVIDER NOTE - PROGRESS NOTE DETAILS
Labs explained to son  Pt with elevated trop, will admit pt.  Drowsiness mostly 2/2 to haldol case d/w Dr. Lee, will admit

## 2023-09-25 NOTE — H&P ADULT - PROBLEM SELECTOR PLAN 2
-patient with trops in 112  -  -patient cannot specify of there is chest pain, dry on PE   -echo in 2019 showed G1DD and normal EF   -will trend trops   -could repeat ECHO -patient with trops in 112  -  -patient cannot specify of there is chest pain, dry on PE   -echo in 2019 showed G1DD and normal EF   -will trend trops

## 2023-09-25 NOTE — H&P ADULT - NSHPPHYSICALEXAM_GEN_ALL_CORE
PHYSICAL EXAMINATION:  GENERAL: NAD, unaware of situation  HEAD:  Atraumatic, Normocephalic  EYES: scleral icterus noted on exam  NECK: Supple,  CHEST/LUNG: Clear to auscultation. Clear to percussion bilaterally; No rales, rhonchi, wheezing, or rubs  HEART: Regular rate and rhythm; No murmurs, rubs, or gallops  ABDOMEN: hyperthermic abdomen, peristalsis present, mild grimacing on deep palpation of upper quadrants    NERVOUS SYSTEM:  Alert & Oriented X2  EXTREMITIES:  2+ Peripheral Pulses, No clubbing, cyanosis, or edema  SKIN: lesion from previous surgery noted in the right extremity

## 2023-09-25 NOTE — ED PROVIDER NOTE - CLINICAL SUMMARY MEDICAL DECISION MAKING FREE TEXT BOX
pt with inc. sleepiness, not eating, weakness, poss 2/2 Haldol.  will get labs, CT to r/o electrolytes imbalance, infectious process, brain pathology.

## 2023-09-25 NOTE — ED ADULT NURSE NOTE - CHIEF COMPLAINT QUOTE
BIBA from home c/o weakness, somnolence, family signed patient out of [facility] 9/24/23, as per family last seen baseline on Friday, baseline dementia, OOB with walker, but noticed she wasn't responding as usual at home, last dose of Haldol 0.5mg unknown, as per daughter, this is a new medication for her that started in assisted living   on scene

## 2023-09-25 NOTE — H&P ADULT - PROBLEM SELECTOR PLAN 3
-patient with hx of dementia  -does not take any meds   -patient AAO1-2 -elevated T.Bili  -scleral icterus on PE   -grimacing in RUQ palpation  -will f/u with RUQ US -elevated T.Bili  -scleral icterus on PE   -grimacing in RUQ palpation  -will monitor CMP

## 2023-09-25 NOTE — H&P ADULT - ATTENDING COMMENTS
Patient is an 89 yo F  from [facility] with a pmhx of dementia-baseline of AAO 1-2  and celiac disease-not on any meds was brought in by her children as she was found to be tremulous and quite lethargic on Sunday afternoon at the AL. Per son and daughter, patient was given haldol 0.5mg at the AL which is a new medication for her and since then she was completely unresponsive. They brought her home but she didn't wake up until this afternoon, hence, they brought her to the hospital.  In ED, patient's VS were stable. She was given IVF bolus after which she woke up. At the time of my evaluation, patient was awake, aaox1 to her name only, otherwise, quite confused. Family confirmed that she is back to her baseline.     ROS limited due to dementia but she didn't necessarily had any complaints.     Labs reviewed- cbc, bmp, LFTs and CE     PE as above   aaox1, non focal , RT jaw swelling- no warmth/tenderness/ erythema or obvious intra-oral s/of infection   dentures in place     A/P:  #Acute metabolic encephalopathy 2/2 medication- resolved   #Rt jaw swelling  #Elevated troponin- likely Demand ischemia   #Dementia w/ behaviour disturbances   #Celiac disease   #Hyperbilirubinemia   #DVT ppx     Plan:  -Patient p/w lethargy 2/2 sedative -haldol. CT head w/ no acute findings. Patient's mental status is at baseline at this time. UA clear and no s/s of infection.   -Pt was noted to have Rt jaw swelling, no tenderness, no s/s of oral cavity infection on exam. CT does show possibility of maxillary sinusitis, however, no tenderness present. She has been wearing her dentures since Saturday without a break per family. Will get CT mandible for further evaluation. Hold off abx at this time   -s/p IM Zyprexa in ED for CT as patient would be uncooperative. D/w both son and daughter at bedside and informed that this medication may cause sedation, both were in agreement to administer it as patient was starting to get agitated and would not cooperate w/ exam.   -Pt noted w/ elevated CE, EKG non-specific changes. No chest pain or sob reported. Likely demand ischaemia   -T.bili mildly elevated, normal alk-phos, AST/ALT. Monitor repeat t.bili in am  -Needs GOC discussion

## 2023-09-25 NOTE — H&P ADULT - NSHPREVIEWOFSYSTEMS_GEN_ALL_CORE
- CONSTITUTIONAL: Denies fever and chills  - HEENT: Denies changes in vision and hearing.  - RESPIRATORY: Denies SOB and cough.  - CV: Denies chest pain and palpitations  - GI: Denies abdominal pain, nausea, vomiting and diarrhea.  - : Denies dysuria and urinary frequency.  - SKIN: Denies rash and pruritus.  - NEUROLOGICAL: Denies headache and syncope.  - PSYCHIATRIC: Denies recent changes in mood. Denies anxiety and depression.    -per son at bedside patient has been shaking and more sleepy than usual

## 2023-09-25 NOTE — ED ADULT TRIAGE NOTE - CHIEF COMPLAINT QUOTE
BIBA from home c/o weakness, somnolence, family signed patient out of [facility] 9/24/23, as per family last seen baseline on Friday, baseline dementia, OOB with walker, but noticed she wasn't responding as usual at home, last dose of Haldol 0.5mg unknown, as per daughter, this is a new medication for her that started in assisted living   on scene

## 2023-09-26 LAB
ALBUMIN SERPL ELPH-MCNC: 3.1 G/DL — LOW (ref 3.5–5)
ALP SERPL-CCNC: 53 U/L — SIGNIFICANT CHANGE UP (ref 40–120)
ALT FLD-CCNC: 29 U/L DA — SIGNIFICANT CHANGE UP (ref 10–60)
ANION GAP SERPL CALC-SCNC: 6 MMOL/L — SIGNIFICANT CHANGE UP (ref 5–17)
AST SERPL-CCNC: 24 U/L — SIGNIFICANT CHANGE UP (ref 10–40)
BILIRUB SERPL-MCNC: 2.4 MG/DL — HIGH (ref 0.2–1.2)
BUN SERPL-MCNC: 9 MG/DL — SIGNIFICANT CHANGE UP (ref 7–18)
CALCIUM SERPL-MCNC: 8.8 MG/DL — SIGNIFICANT CHANGE UP (ref 8.4–10.5)
CHLORIDE SERPL-SCNC: 104 MMOL/L — SIGNIFICANT CHANGE UP (ref 96–108)
CO2 SERPL-SCNC: 30 MMOL/L — SIGNIFICANT CHANGE UP (ref 22–31)
CREAT SERPL-MCNC: 0.63 MG/DL — SIGNIFICANT CHANGE UP (ref 0.5–1.3)
CULTURE RESULTS: SIGNIFICANT CHANGE UP
EGFR: 86 ML/MIN/1.73M2 — SIGNIFICANT CHANGE UP
GLUCOSE SERPL-MCNC: 103 MG/DL — HIGH (ref 70–99)
HCT VFR BLD CALC: 40.2 % — SIGNIFICANT CHANGE UP (ref 34.5–45)
HGB BLD-MCNC: 12.7 G/DL — SIGNIFICANT CHANGE UP (ref 11.5–15.5)
MAGNESIUM SERPL-MCNC: 2.3 MG/DL — SIGNIFICANT CHANGE UP (ref 1.6–2.6)
MCHC RBC-ENTMCNC: 26.1 PG — LOW (ref 27–34)
MCHC RBC-ENTMCNC: 31.6 GM/DL — LOW (ref 32–36)
MCV RBC AUTO: 82.7 FL — SIGNIFICANT CHANGE UP (ref 80–100)
NRBC # BLD: 0 /100 WBCS — SIGNIFICANT CHANGE UP (ref 0–0)
PHOSPHATE SERPL-MCNC: 2.5 MG/DL — SIGNIFICANT CHANGE UP (ref 2.5–4.5)
PLATELET # BLD AUTO: 212 K/UL — SIGNIFICANT CHANGE UP (ref 150–400)
POTASSIUM SERPL-MCNC: 3.5 MMOL/L — SIGNIFICANT CHANGE UP (ref 3.5–5.3)
POTASSIUM SERPL-SCNC: 3.5 MMOL/L — SIGNIFICANT CHANGE UP (ref 3.5–5.3)
PROT SERPL-MCNC: 7.3 G/DL — SIGNIFICANT CHANGE UP (ref 6–8.3)
RBC # BLD: 4.86 M/UL — SIGNIFICANT CHANGE UP (ref 3.8–5.2)
RBC # FLD: 15.1 % — HIGH (ref 10.3–14.5)
SODIUM SERPL-SCNC: 140 MMOL/L — SIGNIFICANT CHANGE UP (ref 135–145)
SPECIMEN SOURCE: SIGNIFICANT CHANGE UP
TROPONIN I, HIGH SENSITIVITY RESULT: 95.2 NG/L — HIGH
WBC # BLD: 7.38 K/UL — SIGNIFICANT CHANGE UP (ref 3.8–10.5)
WBC # FLD AUTO: 7.38 K/UL — SIGNIFICANT CHANGE UP (ref 3.8–10.5)

## 2023-09-26 PROCEDURE — 99232 SBSQ HOSP IP/OBS MODERATE 35: CPT | Mod: GC

## 2023-09-26 RX ADMIN — ENOXAPARIN SODIUM 40 MILLIGRAM(S): 100 INJECTION SUBCUTANEOUS at 06:26

## 2023-09-26 NOTE — PROGRESS NOTE ADULT - PROBLEM SELECTOR PLAN 2
-p/w troponin 112-->100-->95.2  -  -patient cannot specify of there is chest pain, dry on PE   -echo in 2019 showed G1DD and normal EF   -will trend trops

## 2023-09-26 NOTE — PATIENT PROFILE ADULT - FALL HARM RISK - HARM RISK INTERVENTIONS
Assistance with ambulation/Assistance OOB with selected safe patient handling equipment/Communicate Risk of Fall with Harm to all staff/Monitor for mental status changes/Move patient closer to nurses' station/Reinforce activity limits and safety measures with patient and family/Reorient to person, place and time as needed/Tailored Fall Risk Interventions/Toileting schedule using arm’s reach rule for commode and bathroom/Use of alarms - bed, chair and/or voice tab/Visual Cue: Yellow wristband and red socks/Bed in lowest position, wheels locked, appropriate side rails in place/Call bell, personal items and telephone in reach/Instruct patient to call for assistance before getting out of bed or chair/Non-slip footwear when patient is out of bed/[place] to call system/Physically safe environment - no spills, clutter or unnecessary equipment/Purposeful Proactive Rounding/Room/bathroom lighting operational, light cord in reach

## 2023-09-26 NOTE — PHYSICAL THERAPY INITIAL EVALUATION ADULT - GAIT DEVIATIONS NOTED, PT EVAL
Patient received letter for recall colonoscopy with last colonoscopy being done 2006. Patient is not currently taking Coumadin, Plavix, Pletal or any other blood thinners. Patient denies history of heart or lung problems. Patient denies history of chest pain or shortness of breath. No history of kidney disease, renal dialysis, bleeding problems, or liver disease. Denies any hospitalizations or operations in the last six months. No history of rectal bleeding.Patient denies any other GI symptoms.  unsteady gait

## 2023-09-26 NOTE — PHYSICAL THERAPY INITIAL EVALUATION ADULT - GENERAL OBSERVATIONS, REHAB EVAL
female patient in tele, lack of sleep, mild rigidity, refer for PT, presented gait and transfers with rw and assist from PT to unsteady gait

## 2023-09-26 NOTE — PROGRESS NOTE ADULT - SUBJECTIVE AND OBJECTIVE BOX
PGY-1 Progress Note discussed with attending    PAGER #: [[phone]] TILL 5:00 PM  PLEASE CONTACT ON CALL TEAM:  - On Call Team (Please refer to Lisa) FROM 5:00 PM - 8:30PM  - Nightfloat Team FROM 8:30 -7:30 AM    CC: Patient is a 88y old  Female who presents with a chief complaint of     OVERNIGHT EVENTS:    SUBJECTIVE / INTERVAL HPI: Patient seen and examined at bedside. Very limited interview due to altered mental status. Speaking but not responding appropriately and not making sense. Does say that she is alway cold when asked about shivering.     ROS: Unable to obtain full ROS due to mental status    VITAL SIGNS:  Vital Signs Last 24 Hrs  T(C): 36.6 (26 Sep 2023 15:56), Max: 37 (25 Sep 2023 22:41)  T(F): 97.8 (26 Sep 2023 15:56), Max: 98.6 (25 Sep 2023 22:41)  HR: 79 (26 Sep 2023 15:56) (65 - 90)  BP: 156/94 (26 Sep 2023 15:56) (122/60 - 156/94)  BP(mean): --  RR: 18 (26 Sep 2023 15:56) (17 - 20)  SpO2: 97% (26 Sep 2023 15:56) (95% - 98%)    Parameters below as of 26 Sep 2023 15:56  Patient On (Oxygen Delivery Method): room air        PHYSICAL EXAM:    General: WDWN  HEENT: NC/AT; PERRL, anicteric sclera; MMM, halitosis  Neck: supple  Cardiovascular: +S1/S2; RRR  Respiratory: CTA B/L; no W/R/R  Gastrointestinal: soft, NT/ND; +BSx4  Extremities: WWP; no edema, clubbing or cyanosis  Vascular: 2+ radial, DP/PT pulses B/L  Skin: Warm, dry, good turgor, no rashes, or ecchymoses  Neurological: AAOx1; no focal deficits    MEDICATIONS:  MEDICATIONS  (STANDING):  dextrose 5% + sodium chloride 0.9%. 1000 milliLiter(s) (70 mL/Hr) IV Continuous <Continuous>  enoxaparin Injectable 40 milliGRAM(s) SubCutaneous every 24 hours    MEDICATIONS  (PRN):  acetaminophen     Tablet .. 650 milliGRAM(s) Oral every 6 hours PRN Temp greater or equal to 38C (100.4F), Mild Pain (1 - 3)  aluminum hydroxide/magnesium hydroxide/simethicone Suspension 30 milliLiter(s) Oral every 4 hours PRN Dyspepsia  melatonin 3 milliGRAM(s) Oral at bedtime PRN Insomnia  ondansetron Injectable 4 milliGRAM(s) IV Push every 8 hours PRN Nausea and/or Vomiting      ALLERGIES:  Allergies    No Known Allergies    Intolerances        LABS:                        12.7   7.38  )-----------( 212      ( 26 Sep 2023 05:26 )             40.2     09-26    140  |  104  |  9   ----------------------------<  103<H>  3.5   |  30  |  0.63    Ca    8.8      26 Sep 2023 05:26  Phos  2.5     09-26  Mg     2.3     09-26    TPro  7.3  /  Alb  3.1<L>  /  TBili  2.4<H>  /  DBili  x   /  AST  24  /  ALT  29  /  AlkPhos  53  09-26    PT/INR - ( 25 Sep 2023 14:00 )   PT: 15.5 sec;   INR: 1.37 ratio         PTT - ( 25 Sep 2023 14:00 )  PTT:27.8 sec  Urinalysis Basic - ( 26 Sep 2023 05:26 )    Color: x / Appearance: x / SG: x / pH: x  Gluc: 103 mg/dL / Ketone: x  / Bili: x / Urobili: x   Blood: x / Protein: x / Nitrite: x   Leuk Esterase: x / RBC: x / WBC x   Sq Epi: x / Non Sq Epi: x / Bacteria: x      CAPILLARY BLOOD GLUCOSE          RADIOLOGY & ADDITIONAL TESTS: Reviewed.

## 2023-09-26 NOTE — PROGRESS NOTE ADULT - PROBLEM SELECTOR PLAN 1
-patient with new onset agitation and shaking after being in a nursing home   -son states that she is more weak than usual and unable to ambulate independently   -received haldol on the weekend on nursing home   -CTH negative  -no source of infection noted  -could be due to haldol given vs new environment  -PT recommending SHIRLEY  -passed bedside dysphagia screen-->diet advanced to puree

## 2023-09-26 NOTE — PHYSICAL THERAPY INITIAL EVALUATION ADULT - FUNCTIONAL LIMITATIONS, PT EVAL
Chart reviewed.   Requested updates from Care Everywhere.  Immunizations reconciled.    updated.  Placed order for screening mammogram.    self-care/home management/community/leisure

## 2023-09-26 NOTE — PROGRESS NOTE ADULT - PROBLEM SELECTOR PLAN 3
-elevated T.Bili  -scleral icterus on PE   -grimacing in RUQ palpation  -will monitor CMP -elevated T.Bili  -scleral icterus on PE   -will monitor CMP

## 2023-09-27 ENCOUNTER — TRANSCRIPTION ENCOUNTER (OUTPATIENT)
Age: 88
End: 2023-09-27

## 2023-09-27 PROCEDURE — 99232 SBSQ HOSP IP/OBS MODERATE 35: CPT | Mod: GC

## 2023-09-27 RX ADMIN — ENOXAPARIN SODIUM 40 MILLIGRAM(S): 100 INJECTION SUBCUTANEOUS at 06:30

## 2023-09-27 NOTE — PROGRESS NOTE ADULT - PROBLEM SELECTOR PLAN 2
-p/w troponin 112-->100-->95.2  -  -patient cannot specify of there is chest pain, dry on PE   -echo in 2019 showed G1DD and normal EF   -will trend trops -p/w troponin 112-->100-->95.2  -  -patient cannot specify of there is chest pain, dry on PE   -echo in 2019 showed G1DD and normal EF

## 2023-09-27 NOTE — PROGRESS NOTE ADULT - PROBLEM SELECTOR PLAN 3
-elevated T.Bili  -scleral icterus on PE   -will monitor CMP -elevated T.Bili 2.5  -down trending  -will monitor CMP

## 2023-09-27 NOTE — PROGRESS NOTE ADULT - SUBJECTIVE AND OBJECTIVE BOX
PGY-1 Progress Note discussed with attending    PAGER #: [[phone]] TILL 5:00 PM  PLEASE CONTACT ON CALL TEAM:  - On Call Team (Please refer to Lisa) FROM 5:00 PM - 8:30PM  - Nightfloat Team FROM 8:30 -7:30 AM    CC: Patient is a 88y old  Female who presents with a chief complaint of     OVERNIGHT EVENTS:    SUBJECTIVE / INTERVAL HPI: Patient seen and examined at bedside. AOx1. Limited interview due to mental status. Denies pain.     ROS: Unable to obtain full ROS due to mental status    VITAL SIGNS:  Vital Signs Last 24 Hrs  T(C): 36.9 (27 Sep 2023 10:53), Max: 37.2 (26 Sep 2023 23:26)  T(F): 98.4 (27 Sep 2023 10:53), Max: 99 (26 Sep 2023 23:26)  HR: 80 (27 Sep 2023 10:53) (79 - 88)  BP: 114/54 (27 Sep 2023 10:53) (113/92 - 156/94)  BP(mean): --  RR: 18 (27 Sep 2023 10:53) (17 - 18)  SpO2: 99% (27 Sep 2023 10:53) (95% - 100%)    Parameters below as of 27 Sep 2023 10:53  Patient On (Oxygen Delivery Method): room air        PHYSICAL EXAM:    General: WDWN  HEENT: NC/AT; PERRL, anicteric sclera; MMM  Neck: supple  Cardiovascular: +S1/S2; RRR  Respiratory: CTA B/L; no W/R/R  Gastrointestinal: soft, NT/ND; +BSx4  Extremities: WWP; no edema, clubbing or cyanosis  Vascular: 2+ radial, DP/PT pulses B/L  Skin: Warm, dry, good turgor, no rashes, or ecchymoses  Neurological: AAOx3; no focal deficits    MEDICATIONS:  MEDICATIONS  (STANDING):  dextrose 5% + sodium chloride 0.9%. 1000 milliLiter(s) (70 mL/Hr) IV Continuous <Continuous>  enoxaparin Injectable 40 milliGRAM(s) SubCutaneous every 24 hours    MEDICATIONS  (PRN):  acetaminophen     Tablet .. 650 milliGRAM(s) Oral every 6 hours PRN Temp greater or equal to 38C (100.4F), Mild Pain (1 - 3)  aluminum hydroxide/magnesium hydroxide/simethicone Suspension 30 milliLiter(s) Oral every 4 hours PRN Dyspepsia  melatonin 3 milliGRAM(s) Oral at bedtime PRN Insomnia  ondansetron Injectable 4 milliGRAM(s) IV Push every 8 hours PRN Nausea and/or Vomiting      ALLERGIES:  Allergies    No Known Allergies    Intolerances        LABS:                        12.7   7.38  )-----------( 212      ( 26 Sep 2023 05:26 )             40.2     09-26    140  |  104  |  9   ----------------------------<  103<H>  3.5   |  30  |  0.63    Ca    8.8      26 Sep 2023 05:26  Phos  2.5     09-26  Mg     2.3     09-26    TPro  7.3  /  Alb  3.1<L>  /  TBili  2.4<H>  /  DBili  x   /  AST  24  /  ALT  29  /  AlkPhos  53  09-26    PT/INR - ( 25 Sep 2023 14:00 )   PT: 15.5 sec;   INR: 1.37 ratio         PTT - ( 25 Sep 2023 14:00 )  PTT:27.8 sec  Urinalysis Basic - ( 26 Sep 2023 05:26 )    Color: x / Appearance: x / SG: x / pH: x  Gluc: 103 mg/dL / Ketone: x  / Bili: x / Urobili: x   Blood: x / Protein: x / Nitrite: x   Leuk Esterase: x / RBC: x / WBC x   Sq Epi: x / Non Sq Epi: x / Bacteria: x      CAPILLARY BLOOD GLUCOSE          RADIOLOGY & ADDITIONAL TESTS: Reviewed. PGY-1 Progress Note discussed with attending    PAGER #: [[phone]] TILL 5:00 PM  PLEASE CONTACT ON CALL TEAM:  - On Call Team (Please refer to Lisa) FROM 5:00 PM - 8:30PM  - Nightfloat Team FROM 8:30 -7:30 AM    CC: Patient is a 88y old  Female who presents with a chief complaint of     OVERNIGHT EVENTS:    SUBJECTIVE / INTERVAL HPI: Patient seen and examined at bedside. AOx1. Limited interview due to mental status. Denies pain.     ROS: Unable to obtain full ROS due to mental status    VITAL SIGNS:  Vital Signs Last 24 Hrs  T(C): 36.9 (27 Sep 2023 10:53), Max: 37.2 (26 Sep 2023 23:26)  T(F): 98.4 (27 Sep 2023 10:53), Max: 99 (26 Sep 2023 23:26)  HR: 80 (27 Sep 2023 10:53) (79 - 88)  BP: 114/54 (27 Sep 2023 10:53) (113/92 - 156/94)  BP(mean): --  RR: 18 (27 Sep 2023 10:53) (17 - 18)  SpO2: 99% (27 Sep 2023 10:53) (95% - 100%)    Parameters below as of 27 Sep 2023 10:53  Patient On (Oxygen Delivery Method): room air        PHYSICAL EXAM:    General: WDWN  HEENT: NC/AT; Pupils round 3mm b/l minimally reactive, anicteric sclera; MMM, tongue fasiculations  Neck: supple  Cardiovascular: +S1/S2; RRR  Respiratory: CTA B/L; no W/R/R  Gastrointestinal: soft, NT/ND; +BSx4  Extremities: WWP; no edema, clubbing or cyanosis  Vascular: 2+ radial, DP/PT pulses B/L  Skin: Warm, dry, good turgor, no rashes, or ecchymoses  Neurological: AAOx1; normal and equal strength in b/l UE (unable to fully assess LE strength but able to lift off bed against some resistance), mild muscle spasticity, tongue fasiculations, CNIII: not moving eyes to the right past midline     MEDICATIONS:  MEDICATIONS  (STANDING):  dextrose 5% + sodium chloride 0.9%. 1000 milliLiter(s) (70 mL/Hr) IV Continuous <Continuous>  enoxaparin Injectable 40 milliGRAM(s) SubCutaneous every 24 hours    MEDICATIONS  (PRN):  acetaminophen     Tablet .. 650 milliGRAM(s) Oral every 6 hours PRN Temp greater or equal to 38C (100.4F), Mild Pain (1 - 3)  aluminum hydroxide/magnesium hydroxide/simethicone Suspension 30 milliLiter(s) Oral every 4 hours PRN Dyspepsia  melatonin 3 milliGRAM(s) Oral at bedtime PRN Insomnia  ondansetron Injectable 4 milliGRAM(s) IV Push every 8 hours PRN Nausea and/or Vomiting      ALLERGIES:  Allergies    No Known Allergies    Intolerances        LABS:                        12.7   7.38  )-----------( 212      ( 26 Sep 2023 05:26 )             40.2     09-26    140  |  104  |  9   ----------------------------<  103<H>  3.5   |  30  |  0.63    Ca    8.8      26 Sep 2023 05:26  Phos  2.5     09-26  Mg     2.3     09-26    TPro  7.3  /  Alb  3.1<L>  /  TBili  2.4<H>  /  DBili  x   /  AST  24  /  ALT  29  /  AlkPhos  53  09-26    PT/INR - ( 25 Sep 2023 14:00 )   PT: 15.5 sec;   INR: 1.37 ratio         PTT - ( 25 Sep 2023 14:00 )  PTT:27.8 sec  Urinalysis Basic - ( 26 Sep 2023 05:26 )    Color: x / Appearance: x / SG: x / pH: x  Gluc: 103 mg/dL / Ketone: x  / Bili: x / Urobili: x   Blood: x / Protein: x / Nitrite: x   Leuk Esterase: x / RBC: x / WBC x   Sq Epi: x / Non Sq Epi: x / Bacteria: x      CAPILLARY BLOOD GLUCOSE          RADIOLOGY & ADDITIONAL TESTS: Reviewed.

## 2023-09-27 NOTE — PROGRESS NOTE ADULT - ASSESSMENT
Patient is a 87 yo F with a pmhx of dementia. Patient was brought in by son after having new onset altered mental status and agitation. On labs patient was found to have trops elevated and elevated BNP. EKG showed Anteroseptal infarct with new T wave inversions.  Patient is admitted for AMS and ACS r/o Patient is a 89 yo F with a pmhx of dementia. Patient was brought in by son after having new onset altered mental status and agitation. On labs patient was found to have trops elevated and elevated BNP. EKG new T wave inversions in anterior leads.  Patient is admitted for AMS and ACS r/o

## 2023-09-27 NOTE — DISCHARGE NOTE PROVIDER - ATTENDING DISCHARGE PHYSICAL EXAMINATION:
Patient was seen and examined with son at bedside. Son reports patient is at baseline now    ICU Vital Signs Last 24 Hrs  T(C): 36.5 (02 Oct 2023 05:19), Max: 37 (01 Oct 2023 15:27)  T(F): 97.7 (02 Oct 2023 05:19), Max: 98.6 (01 Oct 2023 15:27)  HR: 77 (02 Oct 2023 05:19) (77 - 89)  BP: 154/95 (02 Oct 2023 05:19) (88/70 - 162/85)  BP(mean): --  ABP: --  ABP(mean): --  RR: 15 (02 Oct 2023 05:19) (15 - 18)  SpO2: 94% (02 Oct 2023 05:19) (92% - 97%)    O2 Parameters below as of 02 Oct 2023 05:19  Patient On (Oxygen Delivery Method): room air      P/E:  GENERAL: NAD  NERVOUS SYSTEM:  Alert & Oriented X0, does not follow commands to complete full neuro exams   CHEST/LUNG: Clear to auscultation anterolaterally  HEART: Regular rate and rhythm; No murmurs, rubs, or gallops  ABDOMEN: Soft, Nontender, Nondistended; Bowel sounds present  EXTREMITIES:  2+ Peripheral Pulses, No clubbing, cyanosis, or edema  SKIN: No rashes or lesions      Labs noted     A/P:  Acute metabolic encephalopathy- resolved, possibly due to worsening dementia   Hyperbilirubinemia   Mildly elevated trop  Hyperbilirubinemia   Celiac disease       Plan:   CTH no acute change, CT mandible noted- no acute finding   Cultures neg so far  No organic cause of Delirium was found, at baseline now as per previous documentation  Avoid anticholinergics, benzodiazepines, limit lines/tubes/tethers/restraints, encourage frequent reorientation/reassurance by staff, encourage good sleep hygiene, adequate lighting  patient not icteric, no s/s of acute cholecystitis, outpatient follow up with repeat LFTs, discussed with son  Trop trended down  PT eval [facility], son agrees for [facility] placement   CM and SW for safe discharge .

## 2023-09-27 NOTE — DISCHARGE NOTE PROVIDER - NSDCCPCAREPLAN_GEN_ALL_CORE_FT
PRINCIPAL DISCHARGE DIAGNOSIS  Diagnosis: Demand ischemia  Assessment and Plan of Treatment:       SECONDARY DISCHARGE DIAGNOSES  Diagnosis: Acute encephalopathy  Assessment and Plan of Treatment:     Diagnosis: Dementia  Assessment and Plan of Treatment:     Diagnosis: Bilirubinemia  Assessment and Plan of Treatment:      PRINCIPAL DISCHARGE DIAGNOSIS  Diagnosis: Acute encephalopathy  Assessment and Plan of Treatment: You were brought to the hospital due to your families concern that your mental status had changed acutely, with increased confusion and decreased awarness of your surroundings. They were also concerned about associated issues with your balance and your body shaking more than usual. You were admitted for observation due to acute encephalopathy. Your mental status improved and your symptoms of muscle spasticity decreased. It is possible that your symptoms were brought on by an adverse effect from anti-psychotic medications you recieved prior to coming to the hospital. By discontinuing this class of medications during your hospital stay your symptoms improved with time. Your symptoms may have been associated with your underlying dementia, exacerbated by your recent move to an unfamiliar enviornment and changes in your routine.      SECONDARY DISCHARGE DIAGNOSES  Diagnosis: Dementia  Assessment and Plan of Treatment: You have a history of dementia which means that you are experiencing deficits with attenton, memory and thinking that interfere with your daily life. Your home QUETIAPINE was held during your hospital stay due to concerns for adverse side effects from other medications your received that have a similar mechanism of action. Your symptoms improved off these medications. You can begin taking your home QUETIAPINE after discharge, however be careful when taking this medication in combination with other anti-psychotic or sedative drugs. Please follow up with your primary care doctor in a week from discharge to adjust medications as needed and discuss further management.      Diagnosis: Demand ischemia  Assessment and Plan of Treatment: Initial laboratory tests showed a mild elevation of a protein  in your blood called troponin, which is a protein that is released from heart cells when they are damaged and is used often to test for possible heart attack. Based on your symptoms and elevated troponin you were admitted to a telemetry unit in the hospital to continuously monitor your heat's rate and rhythm. This test was repeated and your troponin levels quickly trended down, which was reassuring.   EKG tests that can also help diagnose heart attacks were unremarkable. No abnormal events were noted on telemetry. Your elevated troponin was likely caused by an episode of demand ischemia, which is when your heart's demand for oxygen is not fully met during an episode of increased work of your heart for various reasons that are not entirely clear. However we can say that your elevated troponin was NOT DUE TO A HEART ATTACK.    To keep your heart healthy: Take your medications as prescribed. Eat healthy. Stay active. Stay at a healthy weight. Don't smoke and stay away from secondhand smoke. Control your cholesterol and blood pressure. Avoid drinking alcohol or drink only in moderation. Manage stress  Follow up with your primary doctor in 1 week to check on how you are feeling and to discuss further management       PRINCIPAL DISCHARGE DIAGNOSIS  Diagnosis: Acute encephalopathy  Assessment and Plan of Treatment: You were brought to the hospital due to your families concern that your mental status had changed acutely, with increased confusion and decreased awarness of your surroundings. They were also concerned about associated issues with your balance and your body shaking more than usual. You were admitted for observation due to acute encephalopathy. Your mental status improved and your symptoms of muscle spasticity decreased. It is possible that your symptoms were brought on by an adverse effect from anti-psychotic medications you recieved prior to coming to the hospital. By discontinuing this class of medications during your hospital stay your symptoms improved with time. Your symptoms may have been associated with your underlying dementia, exacerbated by your recent move to an unfamiliar enviornment and changes in your routine.      SECONDARY DISCHARGE DIAGNOSES  Diagnosis: Dementia  Assessment and Plan of Treatment: You have a history of dementia which means that you are experiencing deficits with attenton, memory and thinking that interfere with your daily life. Your home QUETIAPINE was held during your hospital stay due to concerns for adverse side effects from other medications your received that have a similar mechanism of action. Your symptoms improved off these medications. Please speak to your pcp if you can continue taking your home QUETIAPINE after discharge, however be careful when taking this medication in combination with other anti-psychotic or sedative drugs. Please follow up with your primary care doctor in a week from discharge to adjust medications as needed and discuss further management.      Diagnosis: Demand ischemia  Assessment and Plan of Treatment: Initial laboratory tests showed a mild elevation of a protein  in your blood called troponin, which is a protein that is released from heart cells when they are damaged and is used often to test for possible heart attack. Based on your symptoms and elevated troponin you were admitted to a telemetry unit in the hospital to continuously monitor your heat's rate and rhythm. This test was repeated and your troponin levels quickly trended down, which was reassuring.   EKG tests that can also help diagnose heart attacks were unremarkable. No abnormal events were noted on telemetry. Your elevated troponin was likely caused by an episode of demand ischemia, which is when your heart's demand for oxygen is not fully met during an episode of increased work of your heart for various reasons that are not entirely clear. However we can say that your elevated troponin was NOT DUE TO A HEART ATTACK.    To keep your heart healthy: Take your medications as prescribed. Eat healthy. Stay active. Stay at a healthy weight. Don't smoke and stay away from secondhand smoke. Control your cholesterol and blood pressure. Avoid drinking alcohol or drink only in moderation. Manage stress  Follow up with your primary doctor in 1 week to check on how you are feeling and to discuss further management

## 2023-09-27 NOTE — DISCHARGE NOTE NURSING/CASE MANAGEMENT/SOCIAL WORK - NSDCPEFALRISK_GEN_ALL_CORE
For information on Fall & Injury Prevention, visit: [email or web address] OR  [email or web address] OR  https://www.cdc.gov/steadi/patient.html

## 2023-09-27 NOTE — DISCHARGE NOTE PROVIDER - NSDCMRMEDTOKEN_GEN_ALL_CORE_FT
multivitamin:    multivitamin:   pantoprazole 20 mg oral delayed release tablet: 1 tab(s) orally once a day  QUEtiapine 25 mg oral tablet: 1 tab(s) orally once a day (in the evening)

## 2023-09-27 NOTE — DISCHARGE NOTE NURSING/CASE MANAGEMENT/SOCIAL WORK - PATIENT PORTAL LINK FT
You can access the FollowMyHealth Patient Portal offered by [facility] by registering at the following website: [email or web address]. By joining [employer]’s FollowMyHealth portal, you will also be able to view your health information using other applications (apps) compatible with our system.

## 2023-09-27 NOTE — PROGRESS NOTE ADULT - PROBLEM SELECTOR PLAN 1
-patient with new onset agitation and shaking after being in a nursing home   -son states that she is more weak than usual and unable to ambulate independently   -received haldol on the weekend on nursing home   -CTH negative  -no source of infection noted  -could be due to haldol given vs new environment  -PT recommending [facility]  -passed bedside dysphagia screen-->diet advanced to puree -patient with new onset agitation and shaking after being in a nursing home   -son states that she is more weak than usual and unable to ambulate independently   -received haldol on the weekend on nursing home, family concerned for overmedication   -CTH negative  -CXR: clear lungs  -UA negative  -no source of infection noted  -could be due to haldol given vs new environment  -PT recommending [facility]  -passed bedside dysphagia screen-->diet advanced to puree

## 2023-09-27 NOTE — DISCHARGE NOTE PROVIDER - CARE PROVIDER_API CALL
BERYL ACOSTA  [address] [place] [address] [address]  [place] [address]  Phone: [phone]  Fax: [phone]  Established Patient  Follow Up Time:

## 2023-09-27 NOTE — DISCHARGE NOTE PROVIDER - HOSPITAL COURSE
Patient is a 89 yo F with a pmhx of dementia with a baseline of AAO 1-2. Brought in by family member after being found shaking and sleepy in the [facility] nursing home patient has been for the past week and a half. Son at bedside states that after seeing that the mother was confused, sleepy and shaking he took her home. Son also sates that he noticed that the mom is weaker than usual and that patient usually walks independently and now patient needs assistance. Son also is stating that mother in the nursing home received one dose of haldol 0.5mg and thinks that this has set her symptoms off. When interviewing the patient, she is unaware of why she is in the hospital and does not have any complaints.  In the ED patient was found to have elevated troponin and elevated BNP. EKG showed Anteroseptal infarct with new T wave inversions. Patient is admitted to telemetry for AMS and ACS r/o.    AMS (altered mental status).   ·  Plan: -patient with new onset agitation and shaking after being in a nursing home   -son states that she is more weak than usual and unable to ambulate independently   -received haldol on the weekend on nursing home   -CTH negative  -no source of infection noted  -could be due to haldol given vs new environment  -PT recommending SHIRLEY  -passed bedside dysphagia screen-->diet advanced to puree.     Problem/Plan - 2:  ·  Problem: Elevated troponin.   ·  Plan: -p/w troponin 112-->100-->95.2  -  -patient cannot specify of there is chest pain, dry on PE   -echo in 2019 showed G1DD and normal EF   -will trend trops.     Problem/Plan - 3:  ·  Problem: Bilirubinemia.   ·  Plan: -elevated T.Bili  -scleral icterus on PE   -will monitor CMP.   Patient is a 89 yo F with a pmhx of dementia with a baseline of AAO 1-2. Brought in by family member after being found shaking and sleepy in the [facility] nursing home patient has been for the past week and a half. Son at bedside states that after seeing that the mother was confused, sleepy and shaking he took her home. Son also sates that he noticed that the mom is weaker than usual and that patient usually walks independently and now patient needs assistance. Son also is stating that mother in the nursing home received one dose of haldol 0.5mg and thinks that this has set her symptoms off. When interviewing the patient, she is unaware of why she is in the hospital and does not have any complaints.  In the ED patient was found to have elevated troponin and elevated BNP. EKG showed Anteroseptal infarct with new T wave inversions. Patient is admitted to telemetry for AMS and ACS r/o. CTH showed No acute intracranial hemorrhage, mass effect, or shift of the midline structures as well as Mild scattered mucosal thickening is seen throughout the paranasal sinuses. A small air-fluid level is seen within the right maxillary sinus. Follow up CT maxillofacial showed Facial bones are intact without fracture. Mild soft tissue edema in the RIGHT perimandibular fat. No adjacent dental infection is noted. The paranasal sinuses are significant for mild mucosal thickening in the RIGHT maxillary sinus.  No abnormal paranasal sinus fluid collection is found.  Acute encephalopathy, gait changes and shaking could be due to haldol given (unknown how often NH was administering) vs worsening dementia exacerbated by new environment. Evaluated by PT who recommended SHIRLEY.   -passed bedside dysphagia screen-->diet advanced to puree.     Problem/Plan - 2:  ·  Problem: Elevated troponin.   ·  Plan: -p/w troponin 112-->100-->95.2  -  -patient cannot specify of there is chest pain, dry on PE   -echo in 2019 showed G1DD and normal EF   -will trend trops.     Problem/Plan - 3:  ·  Problem: Bilirubinemia.   ·  Plan: -elevated T.Bili  -scleral icterus on PE   -will monitor CMP.   Patient is a 89 yo F with a pmhx of dementia with a baseline of AAO 1-2. Brought in by family member after being found shaking and sleepy in the [facility] home patient has been for the past week and a half. Son at bedside states that after seeing that the mother was confused, sleepy and shaking he took her home. Son also sates that he noticed that the mom is weaker than usual and that patient usually walks independently and now patient needs assistance. Son also is stating that mother in the nursing home received one dose of haldol 0.5mg and thinks that this has set her symptoms off. When interviewing the patient, she is unaware of why she is in the hospital and does not have any complaints. Patient could not specify if there is chest pain on exam due to mental status. Appeared dry on exam with no edema and clear breath sounds.  In the ED patient was found to have elevated troponin 112 and normal for age . CXR showed clear lungs. EKG showed Normal sinus rhythm with T wave inversions in anterior leads. Patient is admitted to telemetry for AMS and ACS r/o. Repeat troponin downtrended to 100 and then 95.2. Total bilirubin was mildly elevated 2.5 and repeat Tbili was 2.4. No scleral icterus or jaundice on exam and no RUQ pain noted. Passed bedside dysphagia screen-->diet advanced to puree. CTH showed no acute intracranial hemorrhage, mass effect, or shift of the midline structures as well as mild scattered mucosal thickening is seen throughout the paranasal sinuses. A small air-fluid level is seen within the right maxillary sinus. Follow up CT maxillofacial showed Facial bones are intact without fracture. Mild soft tissue edema in the RIGHT perimandibular fat. No adjacent dental infection was noted. The paranasal sinuses are significant for mild mucosal thickening in the RIGHT maxillary sinus.  No abnormal paranasal sinus fluid collection is found. Monitored on telemetry with no abnormal events noted. Acute encephalopathy, gait changes and shaking could be due to haldol given (unknown how often NH was administering) vs worsening dementia exacerbated by new environment. Evaluated by PT who recommended SHIRLEY. Patient's mental status improved with time. Patient is stable for discharge and is advised to follow up with PCP as outpatient.  Please refer to patient's complete medical chart with documents for a full hospital course, for this is only a brief summary.          Patient is a 89 yo F with a pmhx of dementia with a baseline of AAO 1-2. Brought in by family member after being found shaking and sleepy in the [facility] home patient has been for the past week and a half. Son at bedside states that after seeing that the mother was confused, sleepy and shaking he took her home. Son also states that he noticed that the mom is weaker than usual and that patient usually walks independently and now patient needs assistance. Son also is stating that mother in the nursing home received one dose of haldol 0.5mg and thinks that this has set her symptoms off. When interviewing the patient, she is unaware of why she is in the hospital and does not have any complaints. Patient could not specify if there is chest pain on exam due to mental status. Appeared dry on exam with no edema and clear breath sounds.  In the ED patient was found to have elevated troponin 112 and normal for age . CXR showed clear lungs. EKG showed Normal sinus rhythm with T wave inversions in anterior leads. Patient is admitted to telemetry for AMS and ACS r/o. Repeat troponin downtrended to 100 and then 95.2. Total bilirubin was mildly elevated 2.5 and repeat Tbili was 2.4. No scleral icterus or jaundice on exam and no RUQ pain noted. Patient passed bedside dysphagia screen, thus diet advanced to puree. CTH showed no acute intracranial hemorrhage, mass effect, or shift of the midline structures as well as mild scattered mucosal thickening is seen throughout the paranasal sinuses. A small air-fluid level is seen within the right maxillary sinus. Follow up CT maxillofacial showed Facial bones are intact without fracture. Mild soft tissue edema in the RIGHT perimandibular fat. No adjacent dental infection was noted. The paranasal sinuses are significant for mild mucosal thickening in the RIGHT maxillary sinus.  No abnormal paranasal sinus fluid collection is found. Monitored on telemetry with no abnormal events noted. Acute encephalopathy, gait changes and shaking could be due to haldol given (unknown how often NH was administering) vs worsening dementia exacerbated by new environment. Evaluated by PT who recommended SHIRLEY. Patient's mental status improved with time. Patient is stable for discharge and is advised to follow up with PCP as outpatient.  Please refer to patient's complete medical chart with documents for a full hospital course, for this is only a brief summary.

## 2023-09-28 PROCEDURE — 99231 SBSQ HOSP IP/OBS SF/LOW 25: CPT | Mod: GC

## 2023-09-28 RX ADMIN — ENOXAPARIN SODIUM 40 MILLIGRAM(S): 100 INJECTION SUBCUTANEOUS at 05:49

## 2023-09-28 NOTE — PROGRESS NOTE ADULT - SUBJECTIVE AND OBJECTIVE BOX
PGY-1 Progress Note discussed with attending    PAGER #: [[phone]] TILL 5:00 PM  PLEASE CONTACT ON CALL TEAM:  - On Call Team (Please refer to Lisa) FROM 5:00 PM - 8:30PM  - Nightfloat Team FROM 8:30 -7:30 AM    CC: Patient is a 88y old  Female who presents with a chief complaint of Other specified abnormalities of plasma proteins     (28 Sep 2023 09:16)      OVERNIGHT EVENTS: none    SUBJECTIVE / INTERVAL HPI: Patient seen and examined at bedside. More alert and interactive today. No acute complaints.     ROS:  CONSTITUTIONAL: No weakness, fevers or chills  EYES/ENT: No visual changes;  No vertigo or throat pain   NECK: No pain or stiffness  RESPIRATORY: No cough, wheezing, hemoptysis; No shortness of breath  CARDIOVASCULAR: No chest pain or palpitations  GASTROINTESTINAL: No abdominal or epigastric pain. No nausea, vomiting, or hematemesis; No diarrhea or constipation. No melena or hematochezia.  GENITOURINARY: No dysuria, frequency or hematuria  NEUROLOGICAL: No numbness or weakness  SKIN: No itching, burning, rashes, or lesions     VITAL SIGNS:  Vital Signs Last 24 Hrs  T(C): 36.8 (28 Sep 2023 10:29), Max: 37.5 (27 Sep 2023 19:38)  T(F): 98.2 (28 Sep 2023 10:29), Max: 99.5 (27 Sep 2023 19:38)  HR: 76 (28 Sep 2023 10:29) (68 - 79)  BP: 129/70 (28 Sep 2023 10:29) (113/61 - 142/70)  BP(mean): --  RR: 18 (28 Sep 2023 10:29) (18 - 18)  SpO2: 98% (28 Sep 2023 10:29) (94% - 98%)    Parameters below as of 28 Sep 2023 10:29  Patient On (Oxygen Delivery Method): room air        PHYSICAL EXAM:    General: WDWN  HEENT: NC/AT; PERRL, anicteric sclera; MMM, halitosis  Neck: supple  Cardiovascular: +S1/S2; RRR  Respiratory: CTA B/L; no W/R/R  Gastrointestinal: soft, NT/ND; +BSx4  Extremities: WWP; no edema, clubbing or cyanosis  Vascular: 2+ radial, DP/PT pulses B/L  Skin: Warm, dry, good turgor, no rashes, or ecchymoses  Neurological: AAOx1; no focal deficits    MEDICATIONS:  MEDICATIONS  (STANDING):  dextrose 5% + sodium chloride 0.9%. 1000 milliLiter(s) (70 mL/Hr) IV Continuous <Continuous>  enoxaparin Injectable 40 milliGRAM(s) SubCutaneous every 24 hours    MEDICATIONS  (PRN):  acetaminophen     Tablet .. 650 milliGRAM(s) Oral every 6 hours PRN Temp greater or equal to 38C (100.4F), Mild Pain (1 - 3)  aluminum hydroxide/magnesium hydroxide/simethicone Suspension 30 milliLiter(s) Oral every 4 hours PRN Dyspepsia  melatonin 3 milliGRAM(s) Oral at bedtime PRN Insomnia  ondansetron Injectable 4 milliGRAM(s) IV Push every 8 hours PRN Nausea and/or Vomiting      ALLERGIES:  Allergies    No Known Allergies    Intolerances        LABS:              CAPILLARY BLOOD GLUCOSE          RADIOLOGY & ADDITIONAL TESTS: Reviewed. PGY-1 Progress Note discussed with attending    PAGER #: [[phone]] TILL 5:00 PM  PLEASE CONTACT ON CALL TEAM:  - On Call Team (Please refer to Lisa) FROM 5:00 PM - 8:30PM  - Nightfloat Team FROM 8:30 -7:30 AM    CC: Patient is a 88y old  Female who presents with a chief complaint of Other specified abnormalities of plasma proteins     (28 Sep 2023 09:16)      OVERNIGHT EVENTS: none    SUBJECTIVE / INTERVAL HPI: Patient seen and examined at bedside. More alert and interactive today. No acute complaints.     ROS:  CONSTITUTIONAL: No weakness, fevers or chills  EYES/ENT: No visual changes;  No vertigo or throat pain   NECK: No pain or stiffness  RESPIRATORY: No cough, wheezing, hemoptysis; No shortness of breath  CARDIOVASCULAR: No chest pain or palpitations  GASTROINTESTINAL: No abdominal or epigastric pain. No nausea, vomiting, or hematemesis; No diarrhea or constipation. No melena or hematochezia.  GENITOURINARY: No dysuria, frequency or hematuria  NEUROLOGICAL: No numbness or weakness  SKIN: No itching, burning, rashes, or lesions     VITAL SIGNS:  Vital Signs Last 24 Hrs  T(C): 36.8 (28 Sep 2023 10:29), Max: 37.5 (27 Sep 2023 19:38)  T(F): 98.2 (28 Sep 2023 10:29), Max: 99.5 (27 Sep 2023 19:38)  HR: 76 (28 Sep 2023 10:29) (68 - 79)  BP: 129/70 (28 Sep 2023 10:29) (113/61 - 142/70)  BP(mean): --  RR: 18 (28 Sep 2023 10:29) (18 - 18)  SpO2: 98% (28 Sep 2023 10:29) (94% - 98%)    Parameters below as of 28 Sep 2023 10:29  Patient On (Oxygen Delivery Method): room air        PHYSICAL EXAM:    General: WDWN, pleasantly confused  HEENT: NC/AT; Pupils round 3mm b/l minimally reactive, anicteric sclera; MMM, tongue fasiculations  Neck: supple  Cardiovascular: +S1/S2; RRR  Respiratory: CTA B/L; no W/R/R  Gastrointestinal: soft, NT/ND; +BSx4  Extremities: WWP; no edema, clubbing or cyanosis  Vascular: 2+ radial, DP/PT pulses B/L  Skin: Warm, dry, good turgor, no rashes, or ecchymoses  Neurological: AAOx1; normal and equal strength in b/l UE (unable to fully assess LE strength but able to lift off bed against some resistance), improved muscle spasticity, tongue fasiculations, CNIII: not moving eyes to the right past midline     MEDICATIONS:  MEDICATIONS  (STANDING):  dextrose 5% + sodium chloride 0.9%. 1000 milliLiter(s) (70 mL/Hr) IV Continuous <Continuous>  enoxaparin Injectable 40 milliGRAM(s) SubCutaneous every 24 hours    MEDICATIONS  (PRN):  acetaminophen     Tablet .. 650 milliGRAM(s) Oral every 6 hours PRN Temp greater or equal to 38C (100.4F), Mild Pain (1 - 3)  aluminum hydroxide/magnesium hydroxide/simethicone Suspension 30 milliLiter(s) Oral every 4 hours PRN Dyspepsia  melatonin 3 milliGRAM(s) Oral at bedtime PRN Insomnia  ondansetron Injectable 4 milliGRAM(s) IV Push every 8 hours PRN Nausea and/or Vomiting      ALLERGIES:  Allergies    No Known Allergies    Intolerances        LABS:              CAPILLARY BLOOD GLUCOSE          RADIOLOGY & ADDITIONAL TESTS: Reviewed.

## 2023-09-28 NOTE — PROGRESS NOTE ADULT - ASSESSMENT
Patient is a 87 yo F with a pmhx of dementia. Patient was brought in by son after having new onset altered mental status and agitation. On labs patient was found to have trops elevated and elevated BNP. EKG new T wave inversions in anterior leads.  Patient is admitted for AMS and ACS r/o

## 2023-09-28 NOTE — PROGRESS NOTE ADULT - PROBLEM SELECTOR PLAN 2
-p/w troponin 112-->100-->95.2  -  -patient cannot specify of there is chest pain, dry on PE   -echo in 2019 showed G1DD and normal EF

## 2023-09-28 NOTE — DIETITIAN INITIAL EVALUATION ADULT - PERTINENT MEDS FT
MEDICATIONS  (STANDING):  dextrose 5% + sodium chloride 0.9%. 1000 milliLiter(s) (70 mL/Hr) IV Continuous <Continuous>  enoxaparin Injectable 40 milliGRAM(s) SubCutaneous every 24 hours    MEDICATIONS  (PRN):  acetaminophen     Tablet .. 650 milliGRAM(s) Oral every 6 hours PRN Temp greater or equal to 38C (100.4F), Mild Pain (1 - 3)  aluminum hydroxide/magnesium hydroxide/simethicone Suspension 30 milliLiter(s) Oral every 4 hours PRN Dyspepsia  melatonin 3 milliGRAM(s) Oral at bedtime PRN Insomnia  ondansetron Injectable 4 milliGRAM(s) IV Push every 8 hours PRN Nausea and/or Vomiting

## 2023-09-28 NOTE — DIETITIAN INITIAL EVALUATION ADULT - FEEDING ASSISTANCE
Nursing to continue feeding assistance and encouragement; Provide food choices within diet Rx as available/updated

## 2023-09-28 NOTE — DIETITIAN INITIAL EVALUATION ADULT - OTHER INFO
Pt lives home with family PTA, was recenly in assisted living facility 9/13 to 9/24/23, back home as family not happy with care received there; Alert, confused with dementia, restless at times, on Enhanced Supervision; Tolerating puree food well, 100% intake with total feeding assistance per nursing; Unknown food allergies per Chart; on discharge planning to home with home care service when meidcally ready per team, Case Management,  on the case

## 2023-09-28 NOTE — DIETITIAN INITIAL EVALUATION ADULT - FACTORS AFF FOOD INTAKE
advanced age with acute on chronic comorbidities including dementia/change in mental status/difficulty feeding self/Ep[employer]/ethnic/cultural/personal food preferences

## 2023-09-28 NOTE — DIETITIAN INITIAL EVALUATION ADULT - PROBLEM SELECTOR PLAN 2
-patient with trops in 112  -  -patient cannot specify of there is chest pain, dry on PE   -echo in 2019 showed G1DD and normal EF   -will trend trops

## 2023-09-28 NOTE — DIETITIAN INITIAL EVALUATION ADULT - PROBLEM SELECTOR PLAN 1
-patient with new onset agitation and shaking after being in a nursing home   -son states that she is more weak than usual and unable to ambulate independently   -received haldol on the weekend on nursing home   -CTH negative  -no source of infection noted  -could be due to haldol given vs new environment  -PT ordered  -bedside S&S

## 2023-09-28 NOTE — PROGRESS NOTE ADULT - PROBLEM SELECTOR PLAN 1
-patient with new onset agitation and shaking after being in a nursing home   -son states that she is more weak than usual and unable to ambulate independently   -received haldol on the weekend on nursing home, family concerned for overmedication   -CTH negative  -CXR: clear lungs  -UA negative  -no source of infection noted  -could be due to haldol given vs new environment  -PT recommending SHIRLEY  -passed bedside dysphagia screen-->diet advanced to puree  -improving

## 2023-09-29 PROCEDURE — 99231 SBSQ HOSP IP/OBS SF/LOW 25: CPT | Mod: GC

## 2023-09-29 RX ADMIN — ENOXAPARIN SODIUM 40 MILLIGRAM(S): 100 INJECTION SUBCUTANEOUS at 05:38

## 2023-09-29 NOTE — PROGRESS NOTE ADULT - SUBJECTIVE AND OBJECTIVE BOX
PGY-1 Progress Note discussed with attending    PAGER #: [[phone]] TILL 5:00 PM  PLEASE CONTACT ON CALL TEAM:  - On Call Team (Please refer to Lisa) FROM 5:00 PM - 8:30PM  - Nightfloat Team FROM 8:30 -7:30 AM    CC: Patient is a 88y old  Female who presents with a chief complaint of Other specified abnormalities of plasma proteins     (28 Sep 2023 09:16)      OVERNIGHT EVENTS:    SUBJECTIVE / INTERVAL HPI: Patient seen and examined at bedside. Remains alert and interactive with more intelligible speech and no longer having muscle spasticity. Per family at bedside yesterday patient has returned to baseline  No acute complaints    ROS:  CONSTITUTIONAL: No weakness, fevers or chills  EYES/ENT: No visual changes;  No vertigo or throat pain   NECK: No pain or stiffness  RESPIRATORY: No cough, wheezing, hemoptysis; No shortness of breath  CARDIOVASCULAR: No chest pain or palpitations  GASTROINTESTINAL: No abdominal or epigastric pain. No nausea, vomiting, or hematemesis; No diarrhea or constipation. No melena or hematochezia.  GENITOURINARY: No dysuria, frequency or hematuria  NEUROLOGICAL: No numbness or weakness  SKIN: No itching, burning, rashes, or lesions     VITAL SIGNS:  Vital Signs Last 24 Hrs  T(C): 36.8 (29 Sep 2023 11:01), Max: 36.9 (28 Sep 2023 16:11)  T(F): 98.2 (29 Sep 2023 11:01), Max: 98.4 (28 Sep 2023 16:11)  HR: 80 (29 Sep 2023 11:01) (71 - 93)  BP: 125/60 (29 Sep 2023 11:01) (113/89 - 174/89)  BP(mean): --  RR: 18 (29 Sep 2023 11:01) (18 - 18)  SpO2: 98% (29 Sep 2023 11:01) (95% - 100%)    Parameters below as of 29 Sep 2023 11:01  Patient On (Oxygen Delivery Method): room air        PHYSICAL EXAM:    General: WDWN, pleasantly confused, NAD  HEENT: NC/AT; PERRL; MMM  Neck: supple  Cardiovascular: +S1/S2; RRR  Respiratory: CTA B/L; no W/R/R  Gastrointestinal: soft, NT/ND; +BSx4  Extremities: WWP; no edema, clubbing or cyanosis  Vascular: 2+ radial, DP/PT pulses B/L  Skin: Warm, dry, good turgor, no rashes, or ecchymoses  Neurological: AAOx1; no focal deficits    MEDICATIONS:  MEDICATIONS  (STANDING):  dextrose 5% + sodium chloride 0.9%. 1000 milliLiter(s) (70 mL/Hr) IV Continuous <Continuous>  enoxaparin Injectable 40 milliGRAM(s) SubCutaneous every 24 hours    MEDICATIONS  (PRN):  acetaminophen     Tablet .. 650 milliGRAM(s) Oral every 6 hours PRN Temp greater or equal to 38C (100.4F), Mild Pain (1 - 3)  aluminum hydroxide/magnesium hydroxide/simethicone Suspension 30 milliLiter(s) Oral every 4 hours PRN Dyspepsia  melatonin 3 milliGRAM(s) Oral at bedtime PRN Insomnia  ondansetron Injectable 4 milliGRAM(s) IV Push every 8 hours PRN Nausea and/or Vomiting      ALLERGIES:  Allergies    No Known Allergies    Intolerances        LABS:              CAPILLARY BLOOD GLUCOSE          RADIOLOGY & ADDITIONAL TESTS: Reviewed.

## 2023-09-30 PROCEDURE — 99231 SBSQ HOSP IP/OBS SF/LOW 25: CPT | Mod: GC

## 2023-09-30 RX ADMIN — ENOXAPARIN SODIUM 40 MILLIGRAM(S): 100 INJECTION SUBCUTANEOUS at 06:22

## 2023-09-30 NOTE — PROGRESS NOTE ADULT - SUBJECTIVE AND OBJECTIVE BOX
Patient was seen and examined at bedside   Likely at her baseline, alert, pleasant, smiles and greets, follows simple commands but inconsistently. Incoherent speech.     INTERVAL HPI/OVERNIGHT EVENTS:  T(C): 37.1 (09-30-23 @ 11:15), Max: 37.3 (09-30-23 @ 07:17)  HR: 70 (09-30-23 @ 11:15) (68 - 79)  BP: 146/68 (09-30-23 @ 11:15) (128/59 - 155/70)  RR: 19 (09-30-23 @ 11:15) (18 - 20)  SpO2: 97% (09-30-23 @ 11:15) (97% - 98%)  Wt(kg): --  I&O's Summary    29 Sep 2023 07:01  -  30 Sep 2023 07:00  --------------------------------------------------------  IN: 200 mL / OUT: 0 mL / NET: 200 mL        REVIEW OF SYSTEMS: not obtained due to mental status     MEDICATIONS  (STANDING):  dextrose 5% + sodium chloride 0.9%. 1000 milliLiter(s) (70 mL/Hr) IV Continuous <Continuous>  enoxaparin Injectable 40 milliGRAM(s) SubCutaneous every 24 hours    MEDICATIONS  (PRN):  acetaminophen     Tablet .. 650 milliGRAM(s) Oral every 6 hours PRN Temp greater or equal to 38C (100.4F), Mild Pain (1 - 3)  aluminum hydroxide/magnesium hydroxide/simethicone Suspension 30 milliLiter(s) Oral every 4 hours PRN Dyspepsia  melatonin 3 milliGRAM(s) Oral at bedtime PRN Insomnia  ondansetron Injectable 4 milliGRAM(s) IV Push every 8 hours PRN Nausea and/or Vomiting      PHYSICAL EXAM:  GENERAL: NAD  NERVOUS SYSTEM:  Alert & Oriented X0, does not follow commands to complete full neuro exams   CHEST/LUNG: Clear to auscultation anterolaterally  HEART: Regular rate and rhythm; No murmurs, rubs, or gallops  ABDOMEN: Soft, Nontender, Nondistended; Bowel sounds present  EXTREMITIES:  2+ Peripheral Pulses, No clubbing, cyanosis, or edema  SKIN: No rashes or lesions    LABS:              CAPILLARY BLOOD GLUCOSE

## 2023-09-30 NOTE — PROGRESS NOTE ADULT - ASSESSMENT
Acute metabolic encephalopathy- resolved, possibly due to worsening dementia   Hyperbilirubinemia   Mildly elevated trop  Hyperbilirubinemia   Celiac disease       Plan:   CTH no acute change, CT mandible noted- no acute finding   Cultures neg so far  No organic cause of Delirium was found, at baseline now as per previous documentation  Avoid anticholinergics, benzodiazepines, limit lines/tubes/tethers/restraints, encourage frequent reorientation/reassurance by staff, encourage good sleep hygiene, adequate lighting  patient not icteric, no s/s of acute cholecystitis, outpatient follow up with repeat LFTs  Trop trended down  DVT ppx lovenox  PT eval HSIRLEY  CM and SW for safe discharge

## 2023-10-01 LAB
ALBUMIN SERPL ELPH-MCNC: 2.7 G/DL — LOW (ref 3.5–5)
ALP SERPL-CCNC: 49 U/L — SIGNIFICANT CHANGE UP (ref 40–120)
ALT FLD-CCNC: 35 U/L DA — SIGNIFICANT CHANGE UP (ref 10–60)
ANION GAP SERPL CALC-SCNC: 5 MMOL/L — SIGNIFICANT CHANGE UP (ref 5–17)
AST SERPL-CCNC: 29 U/L — SIGNIFICANT CHANGE UP (ref 10–40)
BASOPHILS # BLD AUTO: 0.03 K/UL — SIGNIFICANT CHANGE UP (ref 0–0.2)
BASOPHILS NFR BLD AUTO: 0.6 % — SIGNIFICANT CHANGE UP (ref 0–2)
BILIRUB SERPL-MCNC: 0.9 MG/DL — SIGNIFICANT CHANGE UP (ref 0.2–1.2)
BUN SERPL-MCNC: 10 MG/DL — SIGNIFICANT CHANGE UP (ref 7–18)
CALCIUM SERPL-MCNC: 9.3 MG/DL — SIGNIFICANT CHANGE UP (ref 8.4–10.5)
CHLORIDE SERPL-SCNC: 106 MMOL/L — SIGNIFICANT CHANGE UP (ref 96–108)
CO2 SERPL-SCNC: 30 MMOL/L — SIGNIFICANT CHANGE UP (ref 22–31)
CREAT SERPL-MCNC: 0.72 MG/DL — SIGNIFICANT CHANGE UP (ref 0.5–1.3)
EGFR: 81 ML/MIN/1.73M2 — SIGNIFICANT CHANGE UP
EOSINOPHIL # BLD AUTO: 0.24 K/UL — SIGNIFICANT CHANGE UP (ref 0–0.5)
EOSINOPHIL NFR BLD AUTO: 4.5 % — SIGNIFICANT CHANGE UP (ref 0–6)
GLUCOSE SERPL-MCNC: 98 MG/DL — SIGNIFICANT CHANGE UP (ref 70–99)
HCT VFR BLD CALC: 38.3 % — SIGNIFICANT CHANGE UP (ref 34.5–45)
HGB BLD-MCNC: 12.1 G/DL — SIGNIFICANT CHANGE UP (ref 11.5–15.5)
IMM GRANULOCYTES NFR BLD AUTO: 0.4 % — SIGNIFICANT CHANGE UP (ref 0–0.9)
LYMPHOCYTES # BLD AUTO: 2.07 K/UL — SIGNIFICANT CHANGE UP (ref 1–3.3)
LYMPHOCYTES # BLD AUTO: 39.1 % — SIGNIFICANT CHANGE UP (ref 13–44)
MAGNESIUM SERPL-MCNC: 2 MG/DL — SIGNIFICANT CHANGE UP (ref 1.6–2.6)
MCHC RBC-ENTMCNC: 26.4 PG — LOW (ref 27–34)
MCHC RBC-ENTMCNC: 31.6 GM/DL — LOW (ref 32–36)
MCV RBC AUTO: 83.4 FL — SIGNIFICANT CHANGE UP (ref 80–100)
MONOCYTES # BLD AUTO: 0.57 K/UL — SIGNIFICANT CHANGE UP (ref 0–0.9)
MONOCYTES NFR BLD AUTO: 10.8 % — SIGNIFICANT CHANGE UP (ref 2–14)
NEUTROPHILS # BLD AUTO: 2.36 K/UL — SIGNIFICANT CHANGE UP (ref 1.8–7.4)
NEUTROPHILS NFR BLD AUTO: 44.6 % — SIGNIFICANT CHANGE UP (ref 43–77)
NRBC # BLD: 0 /100 WBCS — SIGNIFICANT CHANGE UP (ref 0–0)
PHOSPHATE SERPL-MCNC: 3.4 MG/DL — SIGNIFICANT CHANGE UP (ref 2.5–4.5)
PLATELET # BLD AUTO: 257 K/UL — SIGNIFICANT CHANGE UP (ref 150–400)
POTASSIUM SERPL-MCNC: 3.8 MMOL/L — SIGNIFICANT CHANGE UP (ref 3.5–5.3)
POTASSIUM SERPL-SCNC: 3.8 MMOL/L — SIGNIFICANT CHANGE UP (ref 3.5–5.3)
PROT SERPL-MCNC: 7 G/DL — SIGNIFICANT CHANGE UP (ref 6–8.3)
RBC # BLD: 4.59 M/UL — SIGNIFICANT CHANGE UP (ref 3.8–5.2)
RBC # FLD: 15 % — HIGH (ref 10.3–14.5)
SODIUM SERPL-SCNC: 141 MMOL/L — SIGNIFICANT CHANGE UP (ref 135–145)
WBC # BLD: 5.29 K/UL — SIGNIFICANT CHANGE UP (ref 3.8–10.5)
WBC # FLD AUTO: 5.29 K/UL — SIGNIFICANT CHANGE UP (ref 3.8–10.5)

## 2023-10-01 PROCEDURE — 99232 SBSQ HOSP IP/OBS MODERATE 35: CPT

## 2023-10-01 RX ORDER — SODIUM CHLORIDE 9 MG/ML
1000 INJECTION INTRAMUSCULAR; INTRAVENOUS; SUBCUTANEOUS ONCE
Refills: 0 | Status: DISCONTINUED | OUTPATIENT
Start: 2023-10-01 | End: 2023-10-01

## 2023-10-01 RX ORDER — SODIUM CHLORIDE 9 MG/ML
500 INJECTION INTRAMUSCULAR; INTRAVENOUS; SUBCUTANEOUS ONCE
Refills: 0 | Status: COMPLETED | OUTPATIENT
Start: 2023-10-01 | End: 2023-10-01

## 2023-10-01 RX ADMIN — ENOXAPARIN SODIUM 40 MILLIGRAM(S): 100 INJECTION SUBCUTANEOUS at 06:40

## 2023-10-01 RX ADMIN — SODIUM CHLORIDE 1000 MILLILITER(S): 9 INJECTION INTRAMUSCULAR; INTRAVENOUS; SUBCUTANEOUS at 17:09

## 2023-10-01 NOTE — PROGRESS NOTE ADULT - ASSESSMENT
Patient is a 89 yo F with a pmhx of dementia. Patient was brought in by son after having new onset altered mental status and agitation. On labs patient was found to have trops elevated and elevated BNP. EKG new T wave inversions in anterior leads.  Patient is admitted for AMS and ACS r/o

## 2023-10-01 NOTE — PROGRESS NOTE ADULT - SUBJECTIVE AND OBJECTIVE BOX
PGY-1 Progress Note discussed with attending    PAGER #: [[phone]] TILL 5:00 PM  PLEASE CONTACT ON CALL TEAM:  - On Call Team (Please refer to Lisa) FROM 5:00 PM - 8:30PM  - Nightfloat Team FROM 8:30 -7:30 AM    CC: Patient is a 88y old  Female who presents with a chief complaint of Other specified abnormalities of plasma proteins     (28 Sep 2023 09:16)      OVERNIGHT EVENTS: no acute events    SUBJECTIVE / INTERVAL HPI: Patient seen and examined at bedside. No acute complaints. Remains at baseline mental status.     ROS:  CONSTITUTIONAL: No weakness, fevers or chills  EYES/ENT: No visual changes;  No vertigo or throat pain   NECK: No pain or stiffness  RESPIRATORY: No cough, wheezing, hemoptysis; No shortness of breath  CARDIOVASCULAR: No chest pain or palpitations  GASTROINTESTINAL: No abdominal or epigastric pain. No nausea, vomiting, or hematemesis; No diarrhea or constipation. No melena or hematochezia.  GENITOURINARY: No dysuria, frequency or hematuria  NEUROLOGICAL: No numbness or weakness  SKIN: No itching, burning, rashes, or lesions     VITAL SIGNS:  Vital Signs Last 24 Hrs  T(C): 36.9 (01 Oct 2023 11:14), Max: 37.2 (30 Sep 2023 15:37)  T(F): 98.4 (01 Oct 2023 11:14), Max: 99 (30 Sep 2023 15:37)  HR: 66 (01 Oct 2023 11:14) (64 - 82)  BP: 135/69 (01 Oct 2023 11:14) (113/52 - 140/69)  BP(mean): 75 (30 Sep 2023 19:36) (64 - 75)  RR: 18 (01 Oct 2023 11:14) (18 - 18)  SpO2: 97% (01 Oct 2023 11:14) (96% - 99%)    Parameters below as of 01 Oct 2023 11:14  Patient On (Oxygen Delivery Method): room air        PHYSICAL EXAM:    General: WDWN, pleasantly confused  HEENT: NC/AT; PERRL, anicteric sclera; MMM  Neck: supple  Cardiovascular: +S1/S2; RRR  Respiratory: CTA B/L; no W/R/R  Gastrointestinal: soft, NT/ND; +BSx4  Extremities: WWP; no edema, clubbing or cyanosis  Vascular: 2+ radial, DP/PT pulses B/L  Skin: Warm, dry, good turgor, no rashes, or ecchymoses  Neurological: AAOx1; CNII-CXII grossly intact, no focal deficits    MEDICATIONS:  MEDICATIONS  (STANDING):  dextrose 5% + sodium chloride 0.9%. 1000 milliLiter(s) (70 mL/Hr) IV Continuous <Continuous>  enoxaparin Injectable 40 milliGRAM(s) SubCutaneous every 24 hours    MEDICATIONS  (PRN):  acetaminophen     Tablet .. 650 milliGRAM(s) Oral every 6 hours PRN Temp greater or equal to 38C (100.4F), Mild Pain (1 - 3)  aluminum hydroxide/magnesium hydroxide/simethicone Suspension 30 milliLiter(s) Oral every 4 hours PRN Dyspepsia  melatonin 3 milliGRAM(s) Oral at bedtime PRN Insomnia  ondansetron Injectable 4 milliGRAM(s) IV Push every 8 hours PRN Nausea and/or Vomiting      ALLERGIES:  Allergies    No Known Allergies    Intolerances        LABS:                        12.1   5.29  )-----------( 257      ( 01 Oct 2023 10:40 )             38.3     10-01    141  |  106  |  10  ----------------------------<  98  3.8   |  30  |  0.72    Ca    9.3      01 Oct 2023 10:40  Phos  3.4     10-01  Mg     2.0     10-01    TPro  7.0  /  Alb  2.7<L>  /  TBili  0.9  /  DBili  x   /  AST  29  /  ALT  35  /  AlkPhos  49  10-01      Urinalysis Basic - ( 01 Oct 2023 10:40 )    Color: x / Appearance: x / SG: x / pH: x  Gluc: 98 mg/dL / Ketone: x  / Bili: x / Urobili: x   Blood: x / Protein: x / Nitrite: x   Leuk Esterase: x / RBC: x / WBC x   Sq Epi: x / Non Sq Epi: x / Bacteria: x      CAPILLARY BLOOD GLUCOSE          RADIOLOGY & ADDITIONAL TESTS: Reviewed.

## 2023-10-02 VITALS
TEMPERATURE: 98 F | DIASTOLIC BLOOD PRESSURE: 61 MMHG | OXYGEN SATURATION: 99 % | HEART RATE: 69 BPM | SYSTOLIC BLOOD PRESSURE: 127 MMHG | RESPIRATION RATE: 16 BRPM

## 2023-10-02 LAB — SARS-COV-2 RNA SPEC QL NAA+PROBE: SIGNIFICANT CHANGE UP

## 2023-10-02 PROCEDURE — 36415 COLL VENOUS BLD VENIPUNCTURE: CPT

## 2023-10-02 PROCEDURE — 70450 CT HEAD/BRAIN W/O DYE: CPT | Mod: MA

## 2023-10-02 PROCEDURE — 87635 SARS-COV-2 COVID-19 AMP PRB: CPT

## 2023-10-02 PROCEDURE — 71045 X-RAY EXAM CHEST 1 VIEW: CPT

## 2023-10-02 PROCEDURE — 99285 EMERGENCY DEPT VISIT HI MDM: CPT

## 2023-10-02 PROCEDURE — 84100 ASSAY OF PHOSPHORUS: CPT

## 2023-10-02 PROCEDURE — 83735 ASSAY OF MAGNESIUM: CPT

## 2023-10-02 PROCEDURE — 83605 ASSAY OF LACTIC ACID: CPT

## 2023-10-02 PROCEDURE — 82009 KETONE BODYS QUAL: CPT

## 2023-10-02 PROCEDURE — 81001 URINALYSIS AUTO W/SCOPE: CPT

## 2023-10-02 PROCEDURE — 85027 COMPLETE CBC AUTOMATED: CPT

## 2023-10-02 PROCEDURE — 97161 PT EVAL LOW COMPLEX 20 MIN: CPT

## 2023-10-02 PROCEDURE — 84484 ASSAY OF TROPONIN QUANT: CPT

## 2023-10-02 PROCEDURE — 85730 THROMBOPLASTIN TIME PARTIAL: CPT

## 2023-10-02 PROCEDURE — 87086 URINE CULTURE/COLONY COUNT: CPT

## 2023-10-02 PROCEDURE — 99239 HOSP IP/OBS DSCHRG MGMT >30: CPT

## 2023-10-02 PROCEDURE — 70486 CT MAXILLOFACIAL W/O DYE: CPT

## 2023-10-02 PROCEDURE — 83880 ASSAY OF NATRIURETIC PEPTIDE: CPT

## 2023-10-02 PROCEDURE — 85025 COMPLETE CBC W/AUTO DIFF WBC: CPT

## 2023-10-02 PROCEDURE — 85610 PROTHROMBIN TIME: CPT

## 2023-10-02 PROCEDURE — 93005 ELECTROCARDIOGRAM TRACING: CPT

## 2023-10-02 PROCEDURE — 86703 HIV-1/HIV-2 1 RESULT ANTBDY: CPT

## 2023-10-02 PROCEDURE — 80053 COMPREHEN METABOLIC PANEL: CPT

## 2023-10-02 PROCEDURE — 87040 BLOOD CULTURE FOR BACTERIA: CPT

## 2023-10-02 RX ORDER — PANTOPRAZOLE SODIUM 20 MG/1
1 TABLET, DELAYED RELEASE ORAL
Refills: 0 | DISCHARGE

## 2023-10-02 RX ORDER — QUETIAPINE FUMARATE 200 MG/1
1 TABLET, FILM COATED ORAL
Refills: 0 | DISCHARGE

## 2023-10-02 RX ADMIN — ENOXAPARIN SODIUM 40 MILLIGRAM(S): 100 INJECTION SUBCUTANEOUS at 05:03

## 2023-10-02 RX ADMIN — Medication 650 MILLIGRAM(S): at 00:07

## 2023-10-02 RX ADMIN — Medication 3 MILLIGRAM(S): at 00:07

## 2023-10-02 RX ADMIN — Medication 650 MILLIGRAM(S): at 01:07

## 2023-10-02 NOTE — PROGRESS NOTE ADULT - ATTENDING COMMENTS
Patient was seen and examined at bedside   At her baseline mental and functional status     ICU Vital Signs Last 24 Hrs  T(C): 36.9 (01 Oct 2023 11:14), Max: 37.2 (30 Sep 2023 15:37)  T(F): 98.4 (01 Oct 2023 11:14), Max: 99 (30 Sep 2023 15:37)  HR: 66 (01 Oct 2023 11:14) (64 - 82)  BP: 135/69 (01 Oct 2023 11:14) (113/52 - 140/69)  BP(mean): 75 (30 Sep 2023 19:36) (64 - 75)  ABP: --  ABP(mean): --  RR: 18 (01 Oct 2023 11:14) (18 - 18)  SpO2: 97% (01 Oct 2023 11:14) (96% - 99%)    O2 Parameters below as of 01 Oct 2023 11:14  Patient On (Oxygen Delivery Method): room air      P/E:  GENERAL: NAD  NERVOUS SYSTEM:  Alert & Oriented X0, incoherent speech, moves all 4 extremities spontaneously  CHEST/LUNG: Clear to auscultation anterolaterally  HEART: Regular rate and rhythm; No murmurs, rubs, or gallops  ABDOMEN: Soft, Nontender, Nondistended; Bowel sounds present  EXTREMITIES:  2+ Peripheral Pulses, No clubbing, cyanosis, or edema  SKIN: No rashes or lesions    labs noted    A/P:  Acute metabolic encephalopathy- resolved, possibly due to worsening dementia   Hyperbilirubinemia   Mildly elevated trop  Hyperbilirubinemia   Celiac disease       Plan:   No electrolyte imbalance   CTH no acute change, CT mandible noted- no acute finding   Cultures neg so far  No organic cause of Delirium was found, at baseline now   Avoid anticholinergics, benzodiazepines, limit lines/tubes/tethers/restraints, encourage frequent reorientation/reassurance by staff, encourage good sleep hygiene, adequate lighting  patient not icteric, no s/s of acute cholecystitis, outpatient follow up with repeat LFTs for bilirubinemia  Trop trended down  DVT ppx lovenox  PT eval [facility]  CM and SW for safe discharge, daughter requests [facility] placement as per JAQUELINE Hung, please verify.
No new medical issues. Labs noted.   Patient is stable for discharge. CM and SW for safe discharge
Patient was seen and examined with son at bedside. Son reports patient is at baseline now    ICU Vital Signs Last 24 Hrs  T(C): 36.5 (02 Oct 2023 05:19), Max: 37 (01 Oct 2023 15:27)  T(F): 97.7 (02 Oct 2023 05:19), Max: 98.6 (01 Oct 2023 15:27)  HR: 77 (02 Oct 2023 05:19) (77 - 89)  BP: 154/95 (02 Oct 2023 05:19) (88/70 - 162/85)  BP(mean): --  ABP: --  ABP(mean): --  RR: 15 (02 Oct 2023 05:19) (15 - 18)  SpO2: 94% (02 Oct 2023 05:19) (92% - 97%)    O2 Parameters below as of 02 Oct 2023 05:19  Patient On (Oxygen Delivery Method): room air      P/E:  GENERAL: NAD  NERVOUS SYSTEM:  Alert & Oriented X0, does not follow commands to complete full neuro exams   CHEST/LUNG: Clear to auscultation anterolaterally  HEART: Regular rate and rhythm; No murmurs, rubs, or gallops  ABDOMEN: Soft, Nontender, Nondistended; Bowel sounds present  EXTREMITIES:  2+ Peripheral Pulses, No clubbing, cyanosis, or edema  SKIN: No rashes or lesions      Labs noted     A/P:  Acute metabolic encephalopathy- resolved, possibly due to worsening dementia   Hyperbilirubinemia   Mildly elevated trop  Hyperbilirubinemia   Celiac disease       Plan:   CTH no acute change, CT mandible noted- no acute finding   Cultures neg so far  No organic cause of Delirium was found, at baseline now as per previous documentation  Avoid anticholinergics, benzodiazepines, limit lines/tubes/tethers/restraints, encourage frequent reorientation/reassurance by staff, encourage good sleep hygiene, adequate lighting  patient not icteric, no s/s of acute cholecystitis, outpatient follow up with repeat LFTs  Trop trended down  DVT ppx lovenox  PT eval [facility], son agrees for [facility] placement   CM and SW for safe discharge
a/p# Dementia w/ worsening behaviour # Functional Quadriplegia    - no new changes, stable, mental status at baseline likely  -no s.s of infection, patient unable to answer to do ros due to mentation.    -family was suppose to pick her up today but changed plans and told CM that [facility] will come evaluate her in the hospital tomorrow and she will dced to Assisted living from hospital  c.w supportive care
patient more awake today, no acute events o.n, denies any complaints. family wants to take home tomorrow after setting up with Assisted Living Facility. will c.w current supportive care
a/p# Dementia w/ worsening behaviour # Functional Quardriplegia    -no s.s of infection, patient unable to answer to do ros due to mentation. does not know why here. per SW family took [facility] out of [facility] and is unable to care for her at home. They want placement at new facility at this time  - no s.s of infection  -will not pursue daily labs and will c.w supportive care and discuss with family [facility] tomorrow
a/p# Dementia w/ worsening behaviour # Functional Quardriplegia    - no change in condition   -no s.s of infection, patient unable to answer to do ros due to mentation. does not know why here.   - no s.s of infection  -will not pursue daily labs and will c.w supportive care and discuss with family [facility] tomorrow   -plan for dc home and than family will find long term placement   -c/w supportive care.

## 2023-10-02 NOTE — PROGRESS NOTE ADULT - SUBJECTIVE AND OBJECTIVE BOX
PGY-2 Progress Note discussed with attending    TEAMS or PAGER #: [[phone]]  TILL 5:00 PM  PLEASE CONTACT ON CALL TEAM:  - On Call Team (Please refer to Lisa) FROM 5:00 PM - 8:30PM  - Nightfloat Team FROM 8:30 -7:30 AM      INTERVAL HPI/OVERNIGHT EVENTS: No events overnight. Pt assessed at the bedside, in NAD, denies any chest pain, sob, fever, chills, n/v/d. Will continue to monitor.         MEDICATIONS  (STANDING):  dextrose 5% + sodium chloride 0.9%. 1000 milliLiter(s) (70 mL/Hr) IV Continuous <Continuous>  enoxaparin Injectable 40 milliGRAM(s) SubCutaneous every 24 hours    MEDICATIONS  (PRN):  acetaminophen     Tablet .. 650 milliGRAM(s) Oral every 6 hours PRN Temp greater or equal to 38C (100.4F), Mild Pain (1 - 3)  aluminum hydroxide/magnesium hydroxide/simethicone Suspension 30 milliLiter(s) Oral every 4 hours PRN Dyspepsia  melatonin 3 milliGRAM(s) Oral at bedtime PRN Insomnia  ondansetron Injectable 4 milliGRAM(s) IV Push every 8 hours PRN Nausea and/or Vomiting      REVIEW OF SYSTEMS:  CONSTITUTIONAL: No fever, weight loss, or fatigue  RESPIRATORY: No cough, wheezing, chills or hemoptysis; No shortness of breath  CARDIOVASCULAR: No chest pain, palpitations, dizziness, or leg swelling  GASTROINTESTINAL: No abdominal pain. No nausea, vomiting, or hematemesis; No diarrhea or constipation. No melena or hematochezia.  GENITOURINARY: No dysuria or hematuria, urinary frequency  NEUROLOGICAL: No headaches, memory loss, loss of strength, numbness, or tremors  SKIN: No itching, burning, rashes, or lesions     Vital Signs Last 24 Hrs  T(C): 36.5 (02 Oct 2023 05:19), Max: 37 (01 Oct 2023 15:27)  T(F): 97.7 (02 Oct 2023 05:19), Max: 98.6 (01 Oct 2023 15:27)  HR: 77 (02 Oct 2023 05:19) (66 - 89)  BP: 154/95 (02 Oct 2023 05:19) (88/70 - 162/85)  BP(mean): --  RR: 15 (02 Oct 2023 05:19) (15 - 18)  SpO2: 94% (02 Oct 2023 05:19) (92% - 97%)    Parameters below as of 02 Oct 2023 05:19  Patient On (Oxygen Delivery Method): room air        PHYSICAL EXAMINATION:  GENERAL: NAD, elderly, frail   HEAD:  Atraumatic, Normocephalic  EYES:  conjunctiva and sclera clear  NECK: Supple, No JVD, Normal thyroid  CHEST/LUNG: Clear to auscultation. Clear to percussion bilaterally; No rales, rhonchi, wheezing, or rubs  HEART: Regular rate and rhythm; No murmurs, rubs, or gallops  ABDOMEN: Soft, Nontender, Nondistended; Bowel sounds present  NERVOUS SYSTEM:  Alert & Oriented X1,    EXTREMITIES:  2+ Peripheral Pulses, No clubbing, cyanosis, or edema  SKIN: warm dry                          12.1   5.29  )-----------( 257      ( 01 Oct 2023 10:40 )             38.3     10-01    141  |  106  |  10  ----------------------------<  98  3.8   |  30  |  0.72    Ca    9.3      01 Oct 2023 10:40  Phos  3.4     10-01  Mg     2.0     10-01    TPro  7.0  /  Alb  2.7<L>  /  TBili  0.9  /  DBili  x   /  AST  29  /  ALT  35  /  AlkPhos  49  10-01    LIVER FUNCTIONS - ( 01 Oct 2023 10:40 )  Alb: 2.7 g/dL / Pro: 7.0 g/dL / ALK PHOS: 49 U/L / ALT: 35 U/L DA / AST: 29 U/L / GGT: x                   CAPILLARY BLOOD GLUCOSE      RADIOLOGY & ADDITIONAL TESTS:

## 2023-10-02 NOTE — PROGRESS NOTE ADULT - PROBLEM SELECTOR PLAN 5
DVT prophy  -lovenox

## 2023-10-02 NOTE — PROGRESS NOTE ADULT - PROBLEM SELECTOR PROBLEM 2
Elevated troponin
Nsaids Counseling: NSAID Counseling: I discussed with the patient that NSAIDs should be taken with food. Prolonged use of NSAIDs can result in the development of stomach ulcers.  Patient advised to stop taking NSAIDs if abdominal pain occurs.  The patient verbalized understanding of the proper use and possible adverse effects of NSAIDs.  All of the patient's questions and concerns were addressed.

## 2023-10-02 NOTE — PROGRESS NOTE ADULT - PROBLEM/PLAN-2
DISPLAY PLAN FREE TEXT
DVT and GI prophylaxis  Continue rifaximin  Discharge planning.

## 2023-10-02 NOTE — PROGRESS NOTE ADULT - PROBLEM SELECTOR PLAN 4
-patient with hx of dementia  -does not take any meds   -patient AAO1-2
-patient with hx of dementia  -does not take any meds   -patient AAO1-2
37.4
-patient with hx of dementia  -does not take any meds   -patient AAO1-2

## 2024-01-01 NOTE — ED ADULT NURSE NOTE - CHIEF COMPLAINT
Due to prematurity    Growth Velocity:  9/9 Pending    PLAN:  Follow weekly growth velocity with goal of 20-30 grams/day if > 2kg once birth weight regained.  Follow weekly length and HC parameters   The patient is a 84y Female complaining of weakness.

## 2024-02-01 ENCOUNTER — TRANSCRIPTION ENCOUNTER (OUTPATIENT)
Age: 89
End: 2024-02-01

## 2024-02-01 ENCOUNTER — EMERGENCY (EMERGENCY)
Facility: HOSPITAL | Age: 89
LOS: 1 days | Discharge: HOME | End: 2024-02-01
Attending: EMERGENCY MEDICINE
Payer: MEDICARE

## 2024-02-01 VITALS
OXYGEN SATURATION: 95 % | WEIGHT: 100.09 LBS | RESPIRATION RATE: 18 BRPM | TEMPERATURE: 98 F | HEART RATE: 86 BPM | SYSTOLIC BLOOD PRESSURE: 144 MMHG | DIASTOLIC BLOOD PRESSURE: 66 MMHG

## 2024-02-01 DIAGNOSIS — Z98.890 OTHER SPECIFIED POSTPROCEDURAL STATES: Chronic | ICD-10-CM

## 2024-02-01 PROCEDURE — 99283 EMERGENCY DEPT VISIT LOW MDM: CPT

## 2024-02-01 NOTE — ED ADULT TRIAGE NOTE - CHIEF COMPLAINT QUOTE
BIBA from assisted living c/o constipation X5 days, Miralax and senna with no BM, baseline AXOX1  hx Dementia, celiac disease, GERD BIBA from assisted living c/o constipation X5 days, Miralax and senna with no BM, baseline AXOX1  hx Dementia, celiac disease, GERD  DNR/DNI

## 2024-02-02 VITALS
RESPIRATION RATE: 18 BRPM | SYSTOLIC BLOOD PRESSURE: 155 MMHG | HEART RATE: 84 BPM | TEMPERATURE: 100 F | OXYGEN SATURATION: 94 % | DIASTOLIC BLOOD PRESSURE: 72 MMHG

## 2024-02-02 PROBLEM — F03.90 UNSPECIFIED DEMENTIA WITHOUT BEHAVIORAL DISTURBANCE: Chronic | Status: ACTIVE | Noted: 2023-09-25

## 2024-02-02 PROCEDURE — 99284 EMERGENCY DEPT VISIT MOD MDM: CPT

## 2024-02-02 RX ADMIN — Medication 1 ENEMA: at 00:33

## 2024-02-02 NOTE — ED PROVIDER NOTE - CARE PROVIDER_API CALL
Dale Box  Internal Medicine  [address], [address]  [place] [address]  Phone: [phone]  Fax: [phone]  Follow Up Time:

## 2024-02-02 NOTE — ED PROVIDER NOTE - PATIENT PORTAL LINK FT
You can access the FollowMyHealth Patient Portal offered by [facility] by registering at the following website: [email or web address]. By joining [employer]’s FollowMyHealth portal, you will also be able to view your health information using other applications (apps) compatible with our system.

## 2024-02-02 NOTE — ED PROVIDER NOTE - CLINICAL SUMMARY MEDICAL DECISION MAKING FREE TEXT BOX
Patient had hard stool passage after Fleet enema.  I manually disimpacted patient with copious amounts of fecal material removed.  Patient no acute distress I will arrange safe transport back to nursing home.  Patient's daughter agrees with plan.  Pt is stable for discharge and follow up with medical doctor. Daughter educated on care and need for follow up. Discussed anticipatory guidance and return precautions. Questions answered. I had a detailed discussion with daughter regarding the historical points, exam findings, and any diagnostic results supporting the discharge diagnosis.

## 2024-02-02 NOTE — ED PROVIDER NOTE - NSFOLLOWUPINSTRUCTIONS_ED_ALL_ED_FT
Constipation is when a person has fewer than three bowel movements in a week, has difficulty having a bowel movement, or has stools (feces) that are dry, hard, or larger than normal. Constipation may be caused by an underlying condition. It may become worse with age if a person takes certain medicines and does not take in enough fluids.    Follow these instructions at home:  Eating and drinking      Eat foods that have a lot of fiber, such as beans, whole grains, and fresh fruits and vegetables.  Limit foods that are low in fiber and high in fat and processed sugars, such as fried or sweet foods. These include french fries, hamburgers, cookies, candies, and soda.  Drink enough fluid to keep your urine pale yellow.  General instructions    Exercise regularly or as told by your health care provider. Try to do 150 minutes of moderate exercise each week.  Use the bathroom when you have the urge to go. Do not hold it in.  Take over-the-counter and prescription medicines only as told by your health care provider. This includes any fiber supplements.  During bowel movements:  Practice deep breathing while relaxing the lower abdomen.  Practice pelvic floor relaxation.  Watch your condition for any changes. Let your health care provider know about them.  Keep all follow-up visits as told by your health care provider. This is important.  Contact a health care provider if:  You have pain that gets worse.  You have a fever.  You do not have a bowel movement after 4 days.  You vomit.  You are not hungry or you lose weight.  You are bleeding from the opening between the buttocks (anus).  You have thin, pencil-like stools.  Get help right away if:  You have a fever and your symptoms suddenly get worse.  You leak stool or have blood in your stool.  Your abdomen is bloated.  You have severe pain in your abdomen.  You feel dizzy or you faint.  Summary  Constipation is when a person has fewer than three bowel movements in a week, has difficulty having a bowel movement, or has stools (feces) that are dry, hard, or larger than normal.  Eat foods that have a lot of fiber, such as beans, whole grains, and fresh fruits and vegetables.  Drink enough fluid to keep your urine pale yellow.  Take over-the-counter and prescription medicines only as told by your health care provider. This includes any fiber supplements.  This information is not intended to replace advice given to you by your health care provider. Make sure you discuss any questions you have with your health care provider.

## 2024-02-02 NOTE — ED ADULT NURSE NOTE - NS ED NURSE LEVEL OF CONSCIOUSNESS SPEECH
Addended by: DENNIS JALLOH on: 6/9/2020 10:07 AM     Modules accepted: Orders    
Speaking Coherently

## 2024-02-02 NOTE — ED ADULT NURSE NOTE - CHIEF COMPLAINT QUOTE
BIBA from assisted living c/o constipation X5 days, Miralax and senna with no BM, baseline AXOX1  hx Dementia, celiac disease, GERD  DNR/DNI

## 2024-02-02 NOTE — ED ADULT NURSE NOTE - OBJECTIVE STATEMENT
the  patient  is  a 88 y female complaining  of  constipation . pt is from assisted living had mirilax and senna . pt is AAOx 1 , dementia and celiac disease the  patient  is  a 88 y female complaining  of  constipation . pt is from assisted living had mirilax and senna . pt is AAOx 1 , dementia and celiac disease sacral decubitus  unstagable 6x4 cm . decubitus  dressing applied

## 2024-02-02 NOTE — ED PROVIDER NOTE - OBJECTIVE STATEMENT
88-year-old female transferred from skilled nursing facility for constipation.  Daughter in attendance states her mother has not had a bowel movement for past 5 days. 88-year-old female transferred from skilled nursing facility for constipation.  Daughter in attendance states her mother has not had a bowel movement for past 5 day.  No reported chest pain, shortness of breath, fever or vomiting.

## 2024-02-02 NOTE — ED PROVIDER NOTE - PHYSICAL EXAMINATION
No distress  Lungs bilateral airway entry, no respiratory distress, no wheezing, no crackles.  CVS regular rate and rhythm S1-S2  Abdomen no guarding full palpation of abdomen, no pulsatile mass, no rigidity.  Extremities contracted, pulses intact

## 2024-02-08 ENCOUNTER — INPATIENT (INPATIENT)
Facility: HOSPITAL | Age: 89
LOS: 5 days | Discharge: HOSPICE | DRG: 682 | End: 2024-02-14
Attending: INTERNAL MEDICINE | Admitting: INTERNAL MEDICINE
Payer: MEDICARE

## 2024-02-08 VITALS
OXYGEN SATURATION: 96 % | TEMPERATURE: 97 F | RESPIRATION RATE: 17 BRPM | HEIGHT: 62 IN | HEART RATE: 83 BPM | SYSTOLIC BLOOD PRESSURE: 114 MMHG | DIASTOLIC BLOOD PRESSURE: 69 MMHG

## 2024-02-08 DIAGNOSIS — Z98.890 OTHER SPECIFIED POSTPROCEDURAL STATES: Chronic | ICD-10-CM

## 2024-02-08 DIAGNOSIS — E87.0 HYPEROSMOLALITY AND HYPERNATREMIA: ICD-10-CM

## 2024-02-08 LAB
ALBUMIN SERPL ELPH-MCNC: 2.1 G/DL — LOW (ref 3.5–5)
ALP SERPL-CCNC: 85 U/L — SIGNIFICANT CHANGE UP (ref 40–120)
ALT FLD-CCNC: 40 U/L DA — SIGNIFICANT CHANGE UP (ref 10–60)
ANION GAP SERPL CALC-SCNC: 2 MMOL/L — LOW (ref 5–17)
APPEARANCE UR: ABNORMAL
AST SERPL-CCNC: 41 U/L — HIGH (ref 10–40)
BASOPHILS # BLD AUTO: 0.02 K/UL — SIGNIFICANT CHANGE UP (ref 0–0.2)
BASOPHILS NFR BLD AUTO: 0.2 % — SIGNIFICANT CHANGE UP (ref 0–2)
BILIRUB SERPL-MCNC: 1.1 MG/DL — SIGNIFICANT CHANGE UP (ref 0.2–1.2)
BILIRUB UR-MCNC: NEGATIVE — SIGNIFICANT CHANGE UP
BUN SERPL-MCNC: 71 MG/DL — HIGH (ref 7–18)
CALCIUM SERPL-MCNC: 9.2 MG/DL — SIGNIFICANT CHANGE UP (ref 8.4–10.5)
CHLORIDE SERPL-SCNC: 148 MMOL/L — HIGH (ref 96–108)
CO2 SERPL-SCNC: 29 MMOL/L — SIGNIFICANT CHANGE UP (ref 22–31)
COLOR SPEC: SIGNIFICANT CHANGE UP
CREAT SERPL-MCNC: 2.09 MG/DL — HIGH (ref 0.5–1.3)
DIFF PNL FLD: NEGATIVE — SIGNIFICANT CHANGE UP
EGFR: 23 ML/MIN/1.73M2 — LOW
EOSINOPHIL # BLD AUTO: 0.04 K/UL — SIGNIFICANT CHANGE UP (ref 0–0.5)
EOSINOPHIL NFR BLD AUTO: 0.4 % — SIGNIFICANT CHANGE UP (ref 0–6)
GLUCOSE SERPL-MCNC: 124 MG/DL — HIGH (ref 70–99)
GLUCOSE UR QL: NEGATIVE MG/DL — SIGNIFICANT CHANGE UP
HCT VFR BLD CALC: 43.6 % — SIGNIFICANT CHANGE UP (ref 34.5–45)
HGB BLD-MCNC: 12.6 G/DL — SIGNIFICANT CHANGE UP (ref 11.5–15.5)
IMM GRANULOCYTES NFR BLD AUTO: 0.4 % — SIGNIFICANT CHANGE UP (ref 0–0.9)
KETONES UR-MCNC: NEGATIVE MG/DL — SIGNIFICANT CHANGE UP
LEUKOCYTE ESTERASE UR-ACNC: ABNORMAL
LYMPHOCYTES # BLD AUTO: 1.66 K/UL — SIGNIFICANT CHANGE UP (ref 1–3.3)
LYMPHOCYTES # BLD AUTO: 14.7 % — SIGNIFICANT CHANGE UP (ref 13–44)
MCHC RBC-ENTMCNC: 26 PG — LOW (ref 27–34)
MCHC RBC-ENTMCNC: 28.9 GM/DL — LOW (ref 32–36)
MCV RBC AUTO: 90.1 FL — SIGNIFICANT CHANGE UP (ref 80–100)
MONOCYTES # BLD AUTO: 0.47 K/UL — SIGNIFICANT CHANGE UP (ref 0–0.9)
MONOCYTES NFR BLD AUTO: 4.2 % — SIGNIFICANT CHANGE UP (ref 2–14)
NEUTROPHILS # BLD AUTO: 9.05 K/UL — HIGH (ref 1.8–7.4)
NEUTROPHILS NFR BLD AUTO: 80.1 % — HIGH (ref 43–77)
NITRITE UR-MCNC: NEGATIVE — SIGNIFICANT CHANGE UP
NRBC # BLD: 0 /100 WBCS — SIGNIFICANT CHANGE UP (ref 0–0)
PH UR: 5 — SIGNIFICANT CHANGE UP (ref 5–8)
PLATELET # BLD AUTO: 203 K/UL — SIGNIFICANT CHANGE UP (ref 150–400)
POTASSIUM SERPL-MCNC: 4.3 MMOL/L — SIGNIFICANT CHANGE UP (ref 3.5–5.3)
POTASSIUM SERPL-SCNC: 4.3 MMOL/L — SIGNIFICANT CHANGE UP (ref 3.5–5.3)
PROT SERPL-MCNC: 8.3 G/DL — SIGNIFICANT CHANGE UP (ref 6–8.3)
PROT UR-MCNC: 30 MG/DL
RBC # BLD: 4.84 M/UL — SIGNIFICANT CHANGE UP (ref 3.8–5.2)
RBC # FLD: 16.1 % — HIGH (ref 10.3–14.5)
SODIUM SERPL-SCNC: 179 MMOL/L — CRITICAL HIGH (ref 135–145)
SP GR SPEC: 1.02 — SIGNIFICANT CHANGE UP (ref 1–1.03)
UROBILINOGEN FLD QL: 1 MG/DL — SIGNIFICANT CHANGE UP (ref 0.2–1)
WBC # BLD: 11.29 K/UL — HIGH (ref 3.8–10.5)
WBC # FLD AUTO: 11.29 K/UL — HIGH (ref 3.8–10.5)

## 2024-02-08 PROCEDURE — 99285 EMERGENCY DEPT VISIT HI MDM: CPT

## 2024-02-08 PROCEDURE — 93010 ELECTROCARDIOGRAM REPORT: CPT

## 2024-02-08 RX ORDER — SODIUM CHLORIDE 9 MG/ML
1000 INJECTION, SOLUTION INTRAVENOUS
Refills: 0 | Status: DISCONTINUED | OUTPATIENT
Start: 2024-02-08 | End: 2024-02-09

## 2024-02-08 RX ORDER — HEPARIN SODIUM 5000 [USP'U]/ML
5000 INJECTION INTRAVENOUS; SUBCUTANEOUS EVERY 8 HOURS
Refills: 0 | Status: DISCONTINUED | OUTPATIENT
Start: 2024-02-08 | End: 2024-02-09

## 2024-02-08 RX ORDER — CEFTRIAXONE 500 MG/1
1000 INJECTION, POWDER, FOR SOLUTION INTRAMUSCULAR; INTRAVENOUS ONCE
Refills: 0 | Status: COMPLETED | OUTPATIENT
Start: 2024-02-08 | End: 2024-02-08

## 2024-02-08 RX ORDER — METRONIDAZOLE 500 MG
500 TABLET ORAL EVERY 8 HOURS
Refills: 0 | Status: DISCONTINUED | OUTPATIENT
Start: 2024-02-08 | End: 2024-02-09

## 2024-02-08 RX ORDER — SODIUM CHLORIDE 9 MG/ML
500 INJECTION INTRAMUSCULAR; INTRAVENOUS; SUBCUTANEOUS ONCE
Refills: 0 | Status: COMPLETED | OUTPATIENT
Start: 2024-02-08 | End: 2024-02-08

## 2024-02-08 RX ORDER — ACETAMINOPHEN 500 MG
650 TABLET ORAL EVERY 6 HOURS
Refills: 0 | Status: DISCONTINUED | OUTPATIENT
Start: 2024-02-08 | End: 2024-02-09

## 2024-02-08 RX ORDER — CEFTRIAXONE 500 MG/1
1000 INJECTION, POWDER, FOR SOLUTION INTRAMUSCULAR; INTRAVENOUS
Refills: 0 | Status: DISCONTINUED | OUTPATIENT
Start: 2024-02-09 | End: 2024-02-09

## 2024-02-08 RX ORDER — CEFTRIAXONE 500 MG/1
1000 INJECTION, POWDER, FOR SOLUTION INTRAMUSCULAR; INTRAVENOUS EVERY 24 HOURS
Refills: 0 | Status: DISCONTINUED | OUTPATIENT
Start: 2024-02-08 | End: 2024-02-08

## 2024-02-08 RX ADMIN — SODIUM CHLORIDE 50 MILLILITER(S): 9 INJECTION, SOLUTION INTRAVENOUS at 20:06

## 2024-02-08 RX ADMIN — SODIUM CHLORIDE 500 MILLILITER(S): 9 INJECTION INTRAMUSCULAR; INTRAVENOUS; SUBCUTANEOUS at 15:25

## 2024-02-08 RX ADMIN — SODIUM CHLORIDE 500 MILLILITER(S): 9 INJECTION INTRAMUSCULAR; INTRAVENOUS; SUBCUTANEOUS at 21:29

## 2024-02-08 RX ADMIN — CEFTRIAXONE 100 MILLIGRAM(S): 500 INJECTION, POWDER, FOR SOLUTION INTRAMUSCULAR; INTRAVENOUS at 20:05

## 2024-02-08 NOTE — ED PROVIDER NOTE - OBJECTIVE STATEMENT
88-year-old woman, history of dementia, presents for failure to thrive, with drowsiness for about 2 weeks, altered mental status, not eating or drinking anything for several days.  Patient was diagnosed with UTI several days ago and initially was taking oral antibiotic but for the past 4 days she has not been cooperating with taking the oral medication at all.  No known fever or chills.  Family also reports right buttock ulcer for about 1 week.  No vomiting/diarrhea/bleeding/trauma.  Patient is unable to provide any history.  Her primary doctor is from [facility].  Hx is from Pt's daughter and son.

## 2024-02-08 NOTE — H&P ADULT - NSHPPHYSICALEXAM_GEN_ALL_CORE
GENERAL: NAD elderly cachetic female   HEAD:  Atraumatic, Normocephalic  EYES:  conjunctiva and sclera clear  NECK: Supple, No JVD, Normal thyroid  CHEST/LUNG: Upper respiratory ronchi.    HEART: Regular rate and rhythm; No murmurs, rubs, or gallops  ABDOMEN: Soft, Nontender, Nondistended; Bowel sounds present  NERVOUS SYSTEM:  Alert & Oriented X0,    EXTREMITIES:  2+ Peripheral Pulses, No clubbing, cyanosis, or edema  SKIN: 3x3cm un-stageable buttock ulcer

## 2024-02-08 NOTE — ED ADULT TRIAGE NOTE - CHIEF COMPLAINT QUOTE
Patient BIB EMS from assisting living for poor appetite and drowsiness x 2 weeks as per son. Patient has history of dementia. Nonverbal as per baseline. As per EMS, patient was found to have low O2 saturation, 87% on room air, EMS placed 4L NC O2 on scene.

## 2024-02-08 NOTE — ED PROVIDER NOTE - SKIN, MLM
Approximately 5 cm diameter decubitus ulcer to right buttock area, no active discharge;  Skin normal color for race, warm, dry and intact. No evidence of rash.

## 2024-02-08 NOTE — ED PROVIDER NOTE - CLINICAL SUMMARY MEDICAL DECISION MAKING FREE TEXT BOX
88-year-old woman, history of dementia, presents for failure to thrive, with drowsiness for about 2 weeks, altered mental status, not eating or drinking anything for several days.  Patient was diagnosed with UTI several days ago and initially was taking oral antibiotic but for the past 4 days she has not been cooperating with taking the oral medication at all--will start IV ceftriaxone for UTI and Vancomycin (for infected decubitus ulcer), labs, IVF, admission

## 2024-02-08 NOTE — H&P ADULT - HISTORY OF PRESENT ILLNESS
This is an 88-year-old woman, with past medical history of dementia, presents to ED from assisted living for failure to thrive, with drowsiness for about 2 weeks, altered mental status, not eating or drinking anything for several days. Spoke to son Brenton who states he was informed that patient was minimally interactive for the past 3 weeks, not eating or drinking as often so she was switched to soft diet. For the past two days she has not been eating anything he states she "lost her ability to swallow" and attributed symptoms to advanced dementia. He was discussing hospice with HCP who is Daughter Merle Flores when they decided to bring her to the hospital for work yup prior to making final decision. Patient was diagnosed with UTI several days ago and initially was taking oral antibiotic but for the past 4 days she has not been cooperating with taking the oral medication at all.  No known fever or chills.  He also reports right buttock ulcer which is getting worse for about 1 week.  No vomiting/diarrhea/bleeding/trauma.  Patient is unable to provide any history.      In ED   vitals: BP: 134/73, RA  s/p Rocephin, S/P: 1LNS  wbc 11.2, NA: 179

## 2024-02-08 NOTE — PHARMACOTHERAPY INTERVENTION NOTE - COMMENTS
Weight check
Patient is from [facility] and their medical record was used to update the outpatient medication review.

## 2024-02-08 NOTE — H&P ADULT - PROBLEM SELECTOR PLAN 3
advanced dementia  decreased po intake   GOC: DNR/DNI  family interested in hospice care  c/w medical mngmt for hypernat and UTI   nutrition eval  Hospice consult

## 2024-02-08 NOTE — ED ADULT NURSE NOTE - CCCP TRG CHIEF CMPLNT
Patient is calling -- regarding her dexcom--- [facility] does not fill this.  She was told to call Jessica and she knows which pharmacy can fill this.  Please call.  Thanks.    Drowsiness x 2 weeks, low O2 stat/poor appetite

## 2024-02-08 NOTE — H&P ADULT - ASSESSMENT
This is an 88-year-old woman, with past medical history of dementia, presents to ED from assisted living for failure to thrive, with drowsiness for about 2 weeks, altered mental status, not eating or drinking anything for several days. Spoke to son Brenton who states he was informed that patient was minimally interactive for the past 3 weeks, not eating or drinking. Patient admitted for failure to thrive and UTI     In ED   vitals: BP: 134/73, RA  s/p Rocephin, S/P: 1LNS  wbc 11.2, NA: 179

## 2024-02-08 NOTE — H&P ADULT - PROBLEM SELECTOR PLAN 1
p/w FTT, not eating or drinking   Na 179   s/p 1LNS in ED  start D50 IVF   f/u BMP q6   do not over correct more than 12meq in 24 hrs p/w FTT, not eating or drinking   Na 179   s/p 1LNS in ED  start D50 IVF   f/u BMP q6   do not over correct more than 12meq in 24 hrs  Telemetry p/w FTT, not eating or drinking   Na 179   s/p 1LNS in ED  start D50 IVF   f/u BMP q6   do not over correct more than 12meq in 24 hrs  Telemetry  Nephrology Dr. Robertson

## 2024-02-08 NOTE — H&P ADULT - CONVERSATION DETAILS
An extensive goals of care conversation was held including options, risks and benefits of many medical treatments including CPR, intubation, and tube feeding. Son Brenton states that she does not want his mother to suffer in case her heart were to stop or her  breathing were to worsen. She has a prior MOLST DNR/DNI. He understands his mother has advanced dementia that is progressive and is interested in hospice however his sister is HCP and would like to have a prior meeting for making a decision that would be in her best interest. I informed him he we will have palliative team reach out in am.

## 2024-02-08 NOTE — ED ADULT NURSE NOTE - CADM POA PRESS ULCER
Patient arrives with complaint of left knee pain. Patient states that she has had slight pain since Thursday but today stepped down and felt a sharp pop and now has worsening pain with ambulation.    Yes

## 2024-02-08 NOTE — ED ADULT NURSE NOTE - NSFALLHARMRISKINTERV_ED_ALL_ED

## 2024-02-08 NOTE — H&P ADULT - PROBLEM SELECTOR PLAN 2
Pt w/ SCr 2.09 on admission  Baseline SCr -normal   pre-renal 2/2 decreased po intake   IVF for now, follow BMP daily

## 2024-02-08 NOTE — ED ADULT NURSE NOTE - OBJECTIVE STATEMENT
Pt BIBA accompanied by son, as per Pt's son the Pt has a hx of dementia and has been unable to swallow food x 2 weeks and the family is concerned for how the patient will eat. Pt is lethargic, non verbal , easily aroused to stimuli. SpO2 of 98% on room air. The family denies that the Pt aspirated any food contents.

## 2024-02-08 NOTE — ED PROVIDER NOTE - CARE PLAN
Principal Discharge DX:	Hypernatremia  Secondary Diagnosis:	Acute UTI   1 Principal Discharge DX:	Hypernatremia  Secondary Diagnosis:	Acute UTI  Secondary Diagnosis:	Decubitus ulcer

## 2024-02-09 DIAGNOSIS — R62.7 ADULT FAILURE TO THRIVE: ICD-10-CM

## 2024-02-09 DIAGNOSIS — N17.9 ACUTE KIDNEY FAILURE, UNSPECIFIED: ICD-10-CM

## 2024-02-09 DIAGNOSIS — E87.0 HYPEROSMOLALITY AND HYPERNATREMIA: ICD-10-CM

## 2024-02-09 DIAGNOSIS — Z51.5 ENCOUNTER FOR PALLIATIVE CARE: ICD-10-CM

## 2024-02-09 DIAGNOSIS — L89.309 PRESSURE ULCER OF UNSPECIFIED BUTTOCK, UNSPECIFIED STAGE: ICD-10-CM

## 2024-02-09 DIAGNOSIS — Z29.9 ENCOUNTER FOR PROPHYLACTIC MEASURES, UNSPECIFIED: ICD-10-CM

## 2024-02-09 DIAGNOSIS — R09.89 OTHER SPECIFIED SYMPTOMS AND SIGNS INVOLVING THE CIRCULATORY AND RESPIRATORY SYSTEMS: ICD-10-CM

## 2024-02-09 DIAGNOSIS — N39.0 URINARY TRACT INFECTION, SITE NOT SPECIFIED: ICD-10-CM

## 2024-02-09 DIAGNOSIS — F03.C0 UNSPECIFIED DEMENTIA, SEVERE, WITHOUT BEHAVIORAL DISTURBANCE, PSYCHOTIC DISTURBANCE, MOOD DISTURBANCE, AND ANXIETY: ICD-10-CM

## 2024-02-09 DIAGNOSIS — E43 UNSPECIFIED SEVERE PROTEIN-CALORIE MALNUTRITION: ICD-10-CM

## 2024-02-09 DIAGNOSIS — R06.02 SHORTNESS OF BREATH: ICD-10-CM

## 2024-02-09 LAB
ANION GAP SERPL CALC-SCNC: 0 MMOL/L — LOW (ref 5–17)
ANION GAP SERPL CALC-SCNC: 1 MMOL/L — SIGNIFICANT CHANGE UP (ref 5–17)
ANION GAP SERPL CALC-SCNC: 2 MMOL/L — LOW (ref 5–17)
ANISOCYTOSIS BLD QL: SLIGHT — SIGNIFICANT CHANGE UP
BASOPHILS # BLD AUTO: 0 K/UL — SIGNIFICANT CHANGE UP (ref 0–0.2)
BASOPHILS NFR BLD AUTO: 0 % — SIGNIFICANT CHANGE UP (ref 0–2)
BUN SERPL-MCNC: 56 MG/DL — HIGH (ref 7–18)
BUN SERPL-MCNC: 59 MG/DL — HIGH (ref 7–18)
BUN SERPL-MCNC: 68 MG/DL — HIGH (ref 7–18)
CALCIUM SERPL-MCNC: 7.9 MG/DL — LOW (ref 8.4–10.5)
CALCIUM SERPL-MCNC: 8.1 MG/DL — LOW (ref 8.4–10.5)
CALCIUM SERPL-MCNC: 8.4 MG/DL — SIGNIFICANT CHANGE UP (ref 8.4–10.5)
CHLORIDE SERPL-SCNC: 143 MMOL/L — HIGH (ref 96–108)
CHLORIDE SERPL-SCNC: 144 MMOL/L — HIGH (ref 96–108)
CHLORIDE SERPL-SCNC: 145 MMOL/L — HIGH (ref 96–108)
CO2 SERPL-SCNC: 27 MMOL/L — SIGNIFICANT CHANGE UP (ref 22–31)
CO2 SERPL-SCNC: 29 MMOL/L — SIGNIFICANT CHANGE UP (ref 22–31)
CO2 SERPL-SCNC: 30 MMOL/L — SIGNIFICANT CHANGE UP (ref 22–31)
CREAT SERPL-MCNC: 1.66 MG/DL — HIGH (ref 0.5–1.3)
CREAT SERPL-MCNC: 1.69 MG/DL — HIGH (ref 0.5–1.3)
CREAT SERPL-MCNC: 2.04 MG/DL — HIGH (ref 0.5–1.3)
EGFR: 23 ML/MIN/1.73M2 — LOW
EGFR: 29 ML/MIN/1.73M2 — LOW
EGFR: 30 ML/MIN/1.73M2 — LOW
EOSINOPHIL # BLD AUTO: 0.1 K/UL — SIGNIFICANT CHANGE UP (ref 0–0.5)
EOSINOPHIL NFR BLD AUTO: 1 % — SIGNIFICANT CHANGE UP (ref 0–6)
GLUCOSE SERPL-MCNC: 164 MG/DL — HIGH (ref 70–99)
GLUCOSE SERPL-MCNC: 170 MG/DL — HIGH (ref 70–99)
GLUCOSE SERPL-MCNC: 331 MG/DL — HIGH (ref 70–99)
HCT VFR BLD CALC: 38.2 % — SIGNIFICANT CHANGE UP (ref 34.5–45)
HGB BLD-MCNC: 10.9 G/DL — LOW (ref 11.5–15.5)
LG PLATELETS BLD QL AUTO: SLIGHT — SIGNIFICANT CHANGE UP
LYMPHOCYTES # BLD AUTO: 2 K/UL — SIGNIFICANT CHANGE UP (ref 1–3.3)
LYMPHOCYTES # BLD AUTO: 20 % — SIGNIFICANT CHANGE UP (ref 13–44)
MAGNESIUM SERPL-MCNC: 3.3 MG/DL — HIGH (ref 1.6–2.6)
MANUAL SMEAR VERIFICATION: SIGNIFICANT CHANGE UP
MCHC RBC-ENTMCNC: 25.9 PG — LOW (ref 27–34)
MCHC RBC-ENTMCNC: 28.5 GM/DL — LOW (ref 32–36)
MCV RBC AUTO: 90.7 FL — SIGNIFICANT CHANGE UP (ref 80–100)
METAMYELOCYTES # FLD: 1 % — HIGH (ref 0–0)
MONOCYTES # BLD AUTO: 0.3 K/UL — SIGNIFICANT CHANGE UP (ref 0–0.9)
MONOCYTES NFR BLD AUTO: 3 % — SIGNIFICANT CHANGE UP (ref 2–14)
NEUTROPHILS # BLD AUTO: 7.49 K/UL — HIGH (ref 1.8–7.4)
NEUTROPHILS NFR BLD AUTO: 73 % — SIGNIFICANT CHANGE UP (ref 43–77)
NEUTS BAND # BLD: 2 % — SIGNIFICANT CHANGE UP (ref 0–8)
NRBC # BLD: 0 /100 WBCS — SIGNIFICANT CHANGE UP (ref 0–0)
OVALOCYTES BLD QL SMEAR: SLIGHT — SIGNIFICANT CHANGE UP
PHOSPHATE SERPL-MCNC: 2.6 MG/DL — SIGNIFICANT CHANGE UP (ref 2.5–4.5)
PLAT MORPH BLD: NORMAL — SIGNIFICANT CHANGE UP
PLATELET # BLD AUTO: 133 K/UL — LOW (ref 150–400)
PLATELET COUNT - ESTIMATE: NORMAL — SIGNIFICANT CHANGE UP
POIKILOCYTOSIS BLD QL AUTO: SLIGHT — SIGNIFICANT CHANGE UP
POTASSIUM SERPL-MCNC: 3.3 MMOL/L — LOW (ref 3.5–5.3)
POTASSIUM SERPL-MCNC: 3.4 MMOL/L — LOW (ref 3.5–5.3)
POTASSIUM SERPL-MCNC: 4 MMOL/L — SIGNIFICANT CHANGE UP (ref 3.5–5.3)
POTASSIUM SERPL-SCNC: 3.3 MMOL/L — LOW (ref 3.5–5.3)
POTASSIUM SERPL-SCNC: 3.4 MMOL/L — LOW (ref 3.5–5.3)
POTASSIUM SERPL-SCNC: 4 MMOL/L — SIGNIFICANT CHANGE UP (ref 3.5–5.3)
RBC # BLD: 4.21 M/UL — SIGNIFICANT CHANGE UP (ref 3.8–5.2)
RBC # FLD: 16.2 % — HIGH (ref 10.3–14.5)
RBC BLD AUTO: ABNORMAL
SODIUM SERPL-SCNC: 172 MMOL/L — CRITICAL HIGH (ref 135–145)
SODIUM SERPL-SCNC: 173 MMOL/L — CRITICAL HIGH (ref 135–145)
SODIUM SERPL-SCNC: 174 MMOL/L — CRITICAL HIGH (ref 135–145)
WBC # BLD: 9.98 K/UL — SIGNIFICANT CHANGE UP (ref 3.8–10.5)
WBC # FLD AUTO: 9.98 K/UL — SIGNIFICANT CHANGE UP (ref 3.8–10.5)

## 2024-02-09 PROCEDURE — 99497 ADVNCD CARE PLAN 30 MIN: CPT | Mod: 25

## 2024-02-09 PROCEDURE — 99223 1ST HOSP IP/OBS HIGH 75: CPT

## 2024-02-09 RX ORDER — POTASSIUM CHLORIDE 20 MEQ
10 PACKET (EA) ORAL
Refills: 0 | Status: COMPLETED | OUTPATIENT
Start: 2024-02-09 | End: 2024-02-09

## 2024-02-09 RX ORDER — ACETAMINOPHEN 500 MG
650 TABLET ORAL EVERY 6 HOURS
Refills: 0 | Status: DISCONTINUED | OUTPATIENT
Start: 2024-02-09 | End: 2024-02-14

## 2024-02-09 RX ORDER — SODIUM CHLORIDE 9 MG/ML
1000 INJECTION, SOLUTION INTRAVENOUS
Refills: 0 | Status: DISCONTINUED | OUTPATIENT
Start: 2024-02-09 | End: 2024-02-09

## 2024-02-09 RX ORDER — HYDROMORPHONE HYDROCHLORIDE 2 MG/ML
0.3 INJECTION INTRAMUSCULAR; INTRAVENOUS; SUBCUTANEOUS EVERY 4 HOURS
Refills: 0 | Status: DISCONTINUED | OUTPATIENT
Start: 2024-02-09 | End: 2024-02-12

## 2024-02-09 RX ORDER — SODIUM CHLORIDE 9 MG/ML
1000 INJECTION, SOLUTION INTRAVENOUS
Refills: 0 | Status: DISCONTINUED | OUTPATIENT
Start: 2024-02-09 | End: 2024-02-12

## 2024-02-09 RX ORDER — ROBINUL 0.2 MG/ML
0.4 INJECTION INTRAMUSCULAR; INTRAVENOUS EVERY 6 HOURS
Refills: 0 | Status: DISCONTINUED | OUTPATIENT
Start: 2024-02-09 | End: 2024-02-14

## 2024-02-09 RX ORDER — HYDROMORPHONE HYDROCHLORIDE 2 MG/ML
0.3 INJECTION INTRAMUSCULAR; INTRAVENOUS; SUBCUTANEOUS EVERY 4 HOURS
Refills: 0 | Status: DISCONTINUED | OUTPATIENT
Start: 2024-02-09 | End: 2024-02-13

## 2024-02-09 RX ADMIN — HYDROMORPHONE HYDROCHLORIDE 0.3 MILLIGRAM(S): 2 INJECTION INTRAMUSCULAR; INTRAVENOUS; SUBCUTANEOUS at 16:42

## 2024-02-09 RX ADMIN — Medication 100 MILLIEQUIVALENT(S): at 05:46

## 2024-02-09 RX ADMIN — HEPARIN SODIUM 5000 UNIT(S): 5000 INJECTION INTRAVENOUS; SUBCUTANEOUS at 05:46

## 2024-02-09 RX ADMIN — SODIUM CHLORIDE 65 MILLILITER(S): 9 INJECTION, SOLUTION INTRAVENOUS at 05:59

## 2024-02-09 RX ADMIN — Medication 100 MILLIEQUIVALENT(S): at 04:38

## 2024-02-09 RX ADMIN — HYDROMORPHONE HYDROCHLORIDE 0.3 MILLIGRAM(S): 2 INJECTION INTRAMUSCULAR; INTRAVENOUS; SUBCUTANEOUS at 16:22

## 2024-02-09 RX ADMIN — Medication 100 MILLIEQUIVALENT(S): at 07:42

## 2024-02-09 RX ADMIN — SODIUM CHLORIDE 30 MILLILITER(S): 9 INJECTION, SOLUTION INTRAVENOUS at 13:00

## 2024-02-09 NOTE — PROGRESS NOTE ADULT - SUBJECTIVE AND OBJECTIVE BOX
PGY-1 Progress Note discussed with attending.    PAGER #: [phone]] TILL 5:00 PM  PLEASE CONTACT ON CALL TEAM:  - On Call Team (Please refer to Lisa) FROM 5:00 PM - 8:30PM  - Nightfloat Team FROM 8:30 -7:30 AM      INTERVAL HPI/OVERNIGHT EVENTS: Patient seen and examined at bedside. No acute overnight events. Patient moaning. Baseline AAOx1. Not following commands, responds to painful stimuli only.      REVIEW OF SYSTEMS:  +UNABLE TO OBTAIN DUE TO PATIENT'S MENTAL STATUS.    Vital Signs Last 24 Hrs  T(C): 36.7 (09 Feb 2024 11:02), Max: 36.8 (08 Feb 2024 15:50)  T(F): 98.1 (09 Feb 2024 11:02), Max: 98.2 (08 Feb 2024 15:50)  HR: 105 (09 Feb 2024 11:02) (51 - 105)  BP: 102/47 (09 Feb 2024 11:02) (102/47 - 138/58)  BP(mean): 65 (09 Feb 2024 11:02) (65 - 81)  RR: 18 (09 Feb 2024 11:02) (18 - 18)  SpO2: 93% (09 Feb 2024 11:02) (93% - 100%)    Parameters below as of 09 Feb 2024 11:02  Patient On (Oxygen Delivery Method): nasal cannula  O2 Flow (L/min): 3      PHYSICAL EXAMINATION:  GENERAL: uncomfortable, moaning, frail elderly woman, malnourished.  HEAD:  Atraumatic, Normocephalic.  EYES:  Conjunctiva and sclera clear.  NECK: Supple, No JVD, Normal thyroid.  CHEST/LUNG: Clear to auscultation. Clear to percussion bilaterally. No rales, rhonchi, wheezing, or rubs.  HEART: Regular rate and rhythm. No murmurs, rubs, or gallops.  ABDOMEN: Soft, Nontender, Nondistended. Bowel sounds present.  NERVOUS SYSTEM:  Alert & Oriented X0, responsive only to painful stimuli.  EXTREMITIES:  2+ Peripheral Pulses. No clubbing, cyanosis, or edema.  SKIN: Warm, dry.                          10.9   9.98  )-----------( 133      ( 09 Feb 2024 10:12 )             38.2     02-09    173<HH>  |  144<H>  |  56<H>  ----------------------------<  170<H>  3.4<L>   |  29  |  1.66<H>    Ca    8.1<L>      09 Feb 2024 11:55  Phos  2.6     02-09  Mg     3.3     02-09    TPro  8.3  /  Alb  2.1<L>  /  TBili  1.1  /  DBili  x   /  AST  41<H>  /  ALT  40  /  AlkPhos  85  02-08    LIVER FUNCTIONS - ( 08 Feb 2024 14:30 )  Alb: 2.1 g/dL / Pro: 8.3 g/dL / ALK PHOS: 85 U/L / ALT: 40 U/L DA / AST: 41 U/L / GGT: x                   CAPILLARY BLOOD GLUCOSE:      RADIOLOGY & ADDITIONAL TESTS:

## 2024-02-09 NOTE — CONSULT NOTE ADULT - PROBLEM SELECTOR RECOMMENDATION 6
Clinical evidence indicates that the patient has Severe protein calorie malnutrition/ 3rd degree    In context of   Chronic Illness (>1 month)--end stage dementia, as evidenced by    Energy/Food intake <50% of estimated energy requirement >5 days  Weight loss: Moderate - severe (lbs lost recently)  Body Fat loss: Severe   grossly cachectic with severe temporal and chest wall wasting, + muscle atrophy)  Muscle mass loss: Severe  (++ unstageable DU of coccyx/sacrum, albumin 2.1)   Strength: weakened severe (bedbound)    Recommend:   Patient actively dying, currently NPO - maintain strict aspiration precautions, careful hand-feeding, teaching to caregivers  Can consider pleasure feeds as tolerated if mentation improves (unlikely)    No feeding tube per family (living will on chart)

## 2024-02-09 NOTE — CONSULT NOTE ADULT - ASSESSMENT
Patient is a 87yo Female with Dementia presents with AMS. P a/w hypernatremia and failure to thrive and DEVIN. Nephrology consulted for Elevated serum creatinine and sodium.     1. DEVIN- likely pre-renal in the setting of decreased PO intake. UA 30 protein with small LE. Check UCX. Check FeNa. Renal function improving with IVF; consistent with pre-renal DEVIN. c/w IVF as below. Will defer Renal US since DEVIN is resolving. Strict I/Os. Avoid nephrotoxins/ NSAIDs/ RCA. Monitor BMP.    2. Hypernatremia- due to free water deficit. Serum Na improving 6 meq over 24 hrs. c/w D5 @ 30ml/hr.  Monitor serial BMP's and UO for overcorrection. Do not correct more than 8 meQ/24 hours. Monitor serum sodium.    3. Hypokalemia- pt give KCl 10meq IV x3 doses. If persistent hypokalemia, can add 20 meq KCl to IVF. Monitor lytes     4. Failure to thrive- palliative following.       [facility] NEPHROLOGY  Timbo Tate M.D.  Dennis Hodges D.O.  Nilam Robertson M.D.  MD Swathi Finn, MSN, ANP-C    Telephone: [phone]  Facsimile: [phone] [place] [address], [address]  [place] [address]   Patient is a 89yo Female with Dementia presents with AMS. P a/w hypernatremia and failure to thrive and DEVIN. Nephrology consulted for Elevated serum creatinine and sodium.     1. DEVIN- baseline SCr 0.6-0.7; DEVIN likely pre-renal in the setting of decreased PO intake. UA 30 protein with small LE. Check UCX. Check FeNa. Renal function improving with IVF; consistent with pre-renal DEVIN. c/w IVF as below. Will defer Renal US since DEVIN is resolving. Strict I/Os. Avoid nephrotoxins/ NSAIDs/ RCA. Monitor BMP.    2. Hypernatremia- due to free water deficit. Serum Na improving 6 meq over 24 hrs. c/w D5 @ 30ml/hr.  Monitor serial BMP's and UO for overcorrection. Do not correct more than 8 meQ/24 hours. Monitor serum sodium.    3. Hypokalemia- pt give KCl 10meq IV x3 doses. If persistent hypokalemia, can add 20 meq KCl to IVF. Monitor lytes     4. Failure to thrive- palliative following.       [facility] NEPHROLOGY  Timbo Tate M.D.  Dennis Hodges D.O.  Nilam Robertson M.D.  MD Swathi Finn, MSN, ANP-C    Telephone: [phone]  Facsimile: [phone] [place] [address], [address]  [place] [address]

## 2024-02-09 NOTE — PROGRESS NOTE ADULT - ASSESSMENT
88F, with PMHx dementia (baseline AAOx1), presents to ED from assisted living for failure to thrive, with drowsiness for about 2 weeks, altered mental status, not eating or drinking anything for several days. Spoke to son Brenton who states he was informed that patient was minimally interactive for the past 3 weeks, not eating or drinking. Found to have Na 179, WBC 11k, and UA+. Admitted for for hypernatremia, failure to thrive, and UTI. Now Comfort Care, MEWS exempt. As per family request, continuing IVF only.

## 2024-02-09 NOTE — PATIENT PROFILE ADULT - FALL HARM RISK - HARM RISK INTERVENTIONS

## 2024-02-09 NOTE — CONSULT NOTE ADULT - PROBLEM SELECTOR RECOMMENDATION 5
Unstageable DU of sacrum/coccyx  Await formal wound care consult, but given that patient is actively dying, would manage conservatively  IV Dilaudid 0.3 mg Q 4 hrs PRN pain/SOB as above  Turning and repositioning Q 2 hours as tolerated

## 2024-02-09 NOTE — CONSULT NOTE ADULT - PROBLEM SELECTOR RECOMMENDATION 7
As above.  87 yo female with end stage dementia, rapid functional decline, FAST 7F, PPS 10%, IPU/inpatient hospice appropriate with likely prognosis of days to < 1 week.    See GOC discussion above, family in agreement with DNR, DNI, and transition to comfort measures only.  IV antibiotics discontinued.    Remainder of management as per primary medical team  PRN IV Dilaudid and glycopyrrolate for symptom management as above  CMO/MEWS exempt/no further blood draws  Family wishes to continue IV fluids for now, D5W decreased to 30 ml/hr  Orders for DNR and DNI in place  Discussed with medical team and Dr. Leon in detail  EOL education and counseling given to family/caregivers at bedside  Pallaitive Care team will continue to follow

## 2024-02-09 NOTE — PATIENT PROFILE ADULT - VISION (WITH CORRECTIVE LENSES IF THE PATIENT USUALLY WEARS THEM):
unable to assess/Partially impaired: cannot see medication labels or newsprint, but can see obstacles in path, and the surrounding layout; can count fingers at arm's length

## 2024-02-09 NOTE — CONSULT NOTE ADULT - SUBJECTIVE AND OBJECTIVE BOX
Consult to: Discuss complex medical decision making related to goals of care    [facility] Geriatric and Palliative Consult Service:  Debra Araujo DO: cell ([phone])  Samy Daniels MD: cell ([phone])  Ronald Cates NP: cell ([phone])   El Anaya LMSW: cell ([phone])   Gillian Vaughan NP: via [employer] Teams    Can contact any Palliative Team member via [employer] Teams for consults and questions      HPI:  This is an 88-year-old woman, with past medical history of dementia, presents to ED from assisted living for failure to thrive, with drowsiness for about 2 weeks, altered mental status, not eating or drinking anything for several days. Spoke to son Brenton who states he was informed that patient was minimally interactive for the past 3 weeks, not eating or drinking as often so she was switched to soft diet. For the past two days she has not been eating anything he states she "lost her ability to swallow" and attributed symptoms to advanced dementia. He was discussing hospice with HCP who is Daughter Merle Flores when they decided to bring her to the hospital for work yup prior to making final decision. Patient was diagnosed with UTI several days ago and initially was taking oral antibiotic but for the past 4 days she has not been cooperating with taking the oral medication at all.  No known fever or chills.  He also reports right buttock ulcer which is getting worse for about 1 week.  No vomiting/diarrhea/bleeding/trauma.  Patient is unable to provide any history.      In ED   vitals: BP: 134/73, RA  s/p Rocephin, S/P: 1LNS  wbc 11.2, NA: 179      (08 Feb 2024 23:34)      PAST MEDICAL & SURGICAL HISTORY:  Dementia      H/O hernia repair      H/O varicose vein ligation and stripping          SOCIAL HISTORY:    Admitted from:  home  (with HHA)           assisted living          [facility]       LTC   [ none ] Substance abuse, [ none ] Tobacco hx, [ none ] Alcohol hx, [ none ] Home Opioid Hx    FAMILY HISTORY:   unable to obtain from patient due to poor mentation, family unable to give information, see H&P for history  Baseline ADLs (prior to admission):    Allergies    No Known Allergies    Intolerances      Present Symptoms: Mild, Moderate, Severe  Pain:             Location -                               Aggravating factors -             Quality -             Radiation -             Timing-             Severity (0-10 scale):             Minimal acceptable level (0-10 scale):  Fatigue:  Nausea:  Lack of Appetite:   SOB:  Depression:  Anxiety:  Review of Systems: [All others negative or Unable to obtain due to poor mentation]    CPOT:    https://ccs-[facility].org/resources/Documents/Sedation%20Analgesia%20Delirium%20in%20CC/CCS%20STH%20Guideline%20template%20CPOT.pdf  PAIN AD Score:   http://geriatrictoolkit.[facility]/cog/painad.pdf (press ctrl +  left click to view)      MEDICATIONS  (STANDING):  dextrose 5%. 1000 milliLiter(s) (30 mL/Hr) IV Continuous <Continuous>    MEDICATIONS  (PRN):  acetaminophen  Suppository .. 650 milliGRAM(s) Rectal every 6 hours PRN Temp greater or equal to 38C (100.4F), Mild Pain (1 - 3)  bisacodyl Suppository 10 milliGRAM(s) Rectal daily PRN Constipation  glycopyrrolate Injectable 0.4 milliGRAM(s) IV Push every 6 hours PRN secretions  HYDROmorphone  Injectable 0.3 milliGRAM(s) IV Push every 4 hours PRN Tachypnea (RR > 22) or labored breathing  HYDROmorphone  Injectable 0.3 milliGRAM(s) IV Push every 4 hours PRN Moderate Pain (4 - 6)      PHYSICAL EXAM:  Vital Signs Last 24 Hrs  T(C): 36.7 (09 Feb 2024 11:02), Max: 36.8 (08 Feb 2024 15:50)  T(F): 98.1 (09 Feb 2024 11:02), Max: 98.2 (08 Feb 2024 15:50)  HR: 105 (09 Feb 2024 11:02) (51 - 105)  BP: 102/47 (09 Feb 2024 11:02) (102/47 - 138/58)  BP(mean): 65 (09 Feb 2024 11:02) (65 - 81)  RR: 18 (09 Feb 2024 11:02) (18 - 18)  SpO2: 93% (09 Feb 2024 11:02) (93% - 100%)    Parameters below as of 09 Feb 2024 11:02  Patient On (Oxygen Delivery Method): nasal cannula  O2 Flow (L/min): 3      General: alert  oriented x ____    lethargic distressed cachexia  nonverbal  unarousable verbal    Palliative Performance Scale/Karnofsky Score:  http://[facility].org/files/news/palliative_performance_scale_ppsv2.pdf    HEENT: no abnormal lesion, dry mouth  ET tube/trach oral lesions:  Lungs: tachypnea/labored breathing, audible excessive secretions  CV: RRR, S1S2, tachycardia  GI: soft non distended non tender  incontinent               PEG/NG/OG tube  constipation  last BM:   : incontinent  oliguria/anuria  reynolds  Musculoskeletal: weakness x4 edema x4    ambulatory with assistance   mostly/fully bedbound/wheelchair bound  Skin: no abnormal skin lesions, poor skin turgor, pressure ulcer stage:   Neuro: no deficits, mild cognitive impairment dsyphagia/dysarthria paresis  Oral intake ability: unable/only mouth care, minimal moderate full capability    LABS:                        10.9   9.98  )-----------( 133      ( 09 Feb 2024 10:12 )             38.2     02-09    173<HH>  |  144<H>  |  56<H>  ----------------------------<  170<H>  3.4<L>   |  29  |  1.66<H>    Ca    8.1<L>      09 Feb 2024 11:55  Phos  2.6     02-09  Mg     3.3     02-09    TPro  8.3  /  Alb  2.1<L>  /  TBili  1.1  /  DBili  x   /  AST  41<H>  /  ALT  40  /  AlkPhos  85  02-08    Urinalysis Basic - ( 09 Feb 2024 11:55 )    Color: x / Appearance: x / SG: x / pH: x  Gluc: 170 mg/dL / Ketone: x  / Bili: x / Urobili: x   Blood: x / Protein: x / Nitrite: x   Leuk Esterase: x / RBC: x / WBC x   Sq Epi: x / Non Sq Epi: x / Bacteria: x        RADIOLOGY & ADDITIONAL STUDIES:     Consult to: Discuss complex medical decision making related to goals of care    [facility] Geriatric and Palliative Consult Service:  Debra Araujo DO: cell ([phone])  Samy Daniels MD: cell ([phone])  Ronald Cates NP: cell ([phone])   El Anaya SW: cell ([phone])   Gillian Vaughan NP: via [employer] Teams    Can contact any Palliative Team member via [employer] Teams for consults and questions      HPI:  This is an 88-year-old woman, with past medical history of dementia, presents to ED from assisted living for failure to thrive, with drowsiness for about 2 weeks, altered mental status, not eating or drinking anything for several days. Spoke to son Brenton who states he was informed that patient was minimally interactive for the past 3 weeks, not eating or drinking as often so she was switched to soft diet. For the past two days she has not been eating anything he states she "lost her ability to swallow" and attributed symptoms to advanced dementia. He was discussing hospice with HCP who is Daughter Merle Flores when they decided to bring her to the hospital for work yup prior to making final decision. Patient was diagnosed with UTI several days ago and initially was taking oral antibiotic but for the past 4 days she has not been cooperating with taking the oral medication at all.  No known fever or chills.  He also reports right buttock ulcer which is getting worse for about 1 week.  No vomiting/diarrhea/bleeding/trauma.  Patient is unable to provide any history.      In ED   vitals: BP: 134/73, RA  s/p Rocephin, S/P: 1LNS  wbc 11.2, NA: 179      (08 Feb 2024 23:34)    Patient was admitted for severe hypernatremia, DEVIN, FTT, and presumed UTI in the setting of advanced dementia.  Started on IV Rocephin and gentle IV hydration with D5W.  Palliative Care consultation requested for medical decision making and additional GOC discussions.    Patient examined this afternoon with two sons and daughter at bedside.  Alert and oriented x zero, lethargic, minimally responsive (but opens eyes to pain and repositioning).  Overall appears comfortable, no overt signs of pain or SOB at rest.  No other acute issues reported by family or nursing staff.      PAST MEDICAL & SURGICAL HISTORY:  Dementia      H/O hernia repair      H/O varicose vein ligation and stripping          SOCIAL HISTORY:    Admitted from:  [facility]  (assisted living)       [ none ] Substance abuse, [ none ] Tobacco hx, [ none ] Alcohol hx, [ none ] Home Opioid Hx    FAMILY HISTORY:  Unable to obtain from patient due to poor mentation, family unable to give information, see H&P for history  Baseline ADLs (prior to admission):  Family reports abrupt and rapid functional decline over the last 3-4 weeks.  Patient previously ambulatory at [facility], spoke > 6 words per day, was tolerating chopped diet w/o dysphagia, and required only moderate assistance with ADLs.  Now bedbound, minimally verbal, not swallowing, and total care in ADLs.      Allergies    No Known Allergies    Intolerances      Present Symptoms: Mild, Moderate, Severe  Pain:  Unable to verbalize, no pain at rest, CPOT 4 with turning/positioning                       Review of Systems: Unable to obtain due to poor mentation/lethargy/dementia    CPOT:  4  https://ccs-[facility].org/resources/Documents/Sedation%20Analgesia%20Delirium%20in%20CC/CCS%20STH%20Guideline%20template%20CPOT.pdf  PAIN AD Score:   4  http://geriatrictoolkit.[place]/cog/painad.pdf (press ctrl +  left click to view)      MEDICATIONS  (STANDING):  dextrose 5%. 1000 milliLiter(s) (30 mL/Hr) IV Continuous <Continuous>    MEDICATIONS  (PRN):  acetaminophen  Suppository .. 650 milliGRAM(s) Rectal every 6 hours PRN Temp greater or equal to 38C (100.4F), Mild Pain (1 - 3)  bisacodyl Suppository 10 milliGRAM(s) Rectal daily PRN Constipation  glycopyrrolate Injectable 0.4 milliGRAM(s) IV Push every 6 hours PRN secretions  HYDROmorphone  Injectable 0.3 milliGRAM(s) IV Push every 4 hours PRN Tachypnea (RR > 22) or labored breathing  HYDROmorphone  Injectable 0.3 milliGRAM(s) IV Push every 4 hours PRN Moderate Pain (4 - 6)      PHYSICAL EXAM:  Vital Signs Last 24 Hrs  T(C): 36.7 (09 Feb 2024 11:02), Max: 36.8 (08 Feb 2024 15:50)  T(F): 98.1 (09 Feb 2024 11:02), Max: 98.2 (08 Feb 2024 15:50)  HR: 105 (09 Feb 2024 11:02) (51 - 105)  BP: 102/47 (09 Feb 2024 11:02) (102/47 - 138/58)  BP(mean): 65 (09 Feb 2024 11:02) (65 - 81)  RR: 18 (09 Feb 2024 11:02) (18 - 18)  SpO2: 93% (09 Feb 2024 11:02) (93% - 100%)    Parameters below as of 09 Feb 2024 11:02  Patient On (Oxygen Delivery Method): nasal cannula  O2 Flow (L/min): 3      General: alert  oriented x __0__    lethargic minimally responsive, + grossly cachectic with severe temporal and chest wall wasting, NAD    Palliative Performance Scale/Karnofsky Score:  10%  http://npcrc.org/files/news/palliative_performance_scale_ppsv2.pdf    HEENT: anicteric, pharynx clear, MM sl dry  Lungs:  overall clear to auscultation, respirations unlabored, no congestion noted  CV:  tachycardic, normal S1 and S2, no murmru  GI: soft non distended non tender   + normal bowel sounds  : incontinent    Musculoskeletal: weakness x4 in all extremities, bedbound, no edema noted  Skin:  + unstageable DU of sacrum/coccyx with slough, + poor skin turgor  Neuro: no focal deficits, ++ severe cognitive impairment   + dysphagia  Oral intake ability: unable/mouth care only    LABS:                        10.9   9.98  )-----------( 133      ( 09 Feb 2024 10:12 )             38.2     02-09    173<HH>  |  144<H>  |  56<H>  ----------------------------<  170<H>  3.4<L>   |  29  |  1.66<H>    Ca    8.1<L>      09 Feb 2024 11:55  Phos  2.6     02-09  Mg     3.3     02-09    TPro  8.3  /  Alb  2.1<L>  /  TBili  1.1  /  DBili  x   /  AST  41<H>  /  ALT  40  /  AlkPhos  85  02-08    Urinalysis Basic - ( 09 Feb 2024 11:55 )    Color: x / Appearance: x / SG: x / pH: x  Gluc: 170 mg/dL / Ketone: x  / Bili: x / Urobili: x   Blood: x / Protein: x / Nitrite: x   Leuk Esterase: x / RBC: x / WBC x   Sq Epi: x / Non Sq Epi: x / Bacteria: x        RADIOLOGY & ADDITIONAL STUDIES:

## 2024-02-09 NOTE — CONSULT NOTE ADULT - PROBLEM SELECTOR RECOMMENDATION 2
Likely due to free water deficit in setting of advanced dementia, poor prognostic sign  Patient is now comfort measures only  Will decrease D5W to 30 ml/hr, risks/benefits of continued IV hydration discussed with family.  They are aware IV fluids will need to be discontinued if symptoms of respiratory congestion or increased fluid retention develop.    No further blood draws

## 2024-02-09 NOTE — CONSULT NOTE ADULT - SUBJECTIVE AND OBJECTIVE BOX
[facility] [place]- [place]ULTATION [place]    Patient is a 89yo Female with Dementia presents with AMS. P a/w hypernatremia and failure to thrive and DEVIN. Nephrology consulted for Elevated serum creatinine and sodium.     Unable to obtain history from patient due to mental status/ not answering questions. As per pt's daughter Yudith; pt stopped eating for the past 1.5 weeks.     PAST MEDICAL & SURGICAL HISTORY:  Dementia      H/O hernia repair      H/O varicose vein ligation and stripping        No Known Allergies    Home Medications Reviewed  Hospital Medications:   MEDICATIONS  (STANDING):  dextrose 5%. 1000 milliLiter(s) (30 mL/Hr) IV Continuous <Continuous>      REVIEW OF SYSTEMS: Unable to obtain due to mental status    VITALS:  T(F): 97.7 (02-09-24 @ 20:40), Max: 98.1 (02-09-24 @ 11:02)  HR: 60 (02-09-24 @ 20:40)  BP: 119/50 (02-09-24 @ 20:40)  RR: 18 (02-09-24 @ 20:40)  SpO2: 98% (02-09-24 @ 20:40)  Wt(kg): --      PHYSICAL EXAM:  Gen: nonverbal  HEENT: NCAT  Neck: no JVD  Cards: RRR, +S1/S2, no M/G/R  Resp: CTA B/L  GI: soft, NT/ND, NABS  : no CVA tenderness  Extremities: no LE edema B/L  Derm: poor skin turgor    LABS:  02-09  173 <--, 174 <--, 172 <--, 179 <--  173<HH>  |  144<H>  |  56<H>  ----------------------------<  170<H>  3.4<L>   |  29  |  1.66<H>    Ca    8.1<L>      09 Feb 2024 11:55  Phos  2.6     02-09  Mg     3.3     02-09    TPro  8.3  /  Alb  2.1<L>  /  TBili  1.1  /  DBili      /  AST  41<H>  /  ALT  40  /  AlkPhos  85  02-08    Creatinine Trend: 1.66 <--, 1.69 <--, 2.04 <--, 2.09 <--                        10.9   9.98  )-----------( 133      ( 09 Feb 2024 10:12 )             38.2     Urine Studies:  Urinalysis Basic - ( 09 Feb 2024 11:55 )    Color:  / Appearance:  / SG:  / pH:   Gluc: 170 mg/dL / Ketone:   / Bili:  / Urobili:    Blood:  / Protein:  / Nitrite:    Leuk Esterase:  / RBC:  / WBC    Sq Epi:  / Non Sq Epi:  / Bacteria:         RADIOLOGY & ADDITIONAL STUDIES:

## 2024-02-09 NOTE — CONSULT NOTE ADULT - ASSESSMENT
Patient is actively dying 2/2 end stage dementia  Discussed with family at bedside, they are agreement with comfort measures only and transition to EOL care  Patient's living will reviewed, family in agreement with DNR, DNI, and discontinuing antibiotics, wish to continue low flow IV fluids for now  LIkely prognosis of days, family aware, discussed with primary team and Dr. Leon.    FULL CONSULT NOTE TO FOLLOW 88-year-old woman, with PMHx of advanced dementia, presented to [facility] ED yesterday 2/8 from [facility] with failure to thrive, with drowsiness for about 2 weeks, altered mental status, not eating or drinking anything for several days.   Found to have Na of 179 in ER.  Patient admitted for severe hypernatremia, DEVIN, FTT, and presumed UTI in the setting of advanced dementia.  Started on IV Rocephin and gentle IV hydration with D5W.  Palliative Care consultation requested for medical decision making and additional GOC discussions.    Patient now appears to be actively dying, family has agreed to DNR, DNI, and comfort measures only.

## 2024-02-09 NOTE — CONSULT NOTE ADULT - CONVERSATION DETAILS
Face to face family meeting held with two sons and daughter at bedside x 30 minutes (as separately identifiable service) to discuss prognosis, ACP, goals of care, and treatment preferences.  Supportive role of Palliative Care team was explained.  Children recounted patient's rapid decline in [facility] over last 3-4 weeks, anticipated disease trajectory.  Discussed that patient appears to have progressed to end stage dementia, complicated by severe hypernatremia and possible UTI.  Discussed that aggressive medical treatment of patient's hypernatremia and UTI would only provide short term benefit (if any), and that patient appears to be dying over the next several days to << 1 week, regardless of further treatment or intervention.  Family is agreed that they wish for the patient to be comfortable at the end of life.  In addition, copy of patient's Living Will (sent over from [facility]) was also reviewed, which very clearly specifies that in case of a terminal condition, patient would not want resuscitation, intubation, artificial nutrition/hydration, or IV antibiotics.  After further discussion, family agreed to DNR, DNI, and comfort measures only.  They voiced agreement with stopping IV antibiotics and blood draws, but wish to continue low rate IV fluids at this time (risks/benefits discussed).  Family also in agreement with starting PRN IV opioids for symptom relief (pain and SOB).  Also discussed possible consideration of inpatient hospice/IPU options if patient survives for more than 48 hours, family voiced understanding.  [facility] Care SW and Chaplaincy referrals offered and accepted.  Updated MOLST form completed.  Family was appreciative of the conversation, and all of their questions were answered. Face to face family meeting held with two sons and daughter/HCP at bedside x 30 minutes (as separately identifiable service) to discuss prognosis, ACP, goals of care, and treatment preferences.  Supportive role of Palliative Care team was explained.  Children recounted patient's rapid decline in [facility] over last 3-4 weeks, anticipated disease trajectory.  Discussed that patient appears to have progressed to end stage dementia, complicated by severe hypernatremia and possible UTI.  Discussed that aggressive medical treatment of patient's hypernatremia and UTI would only provide short term benefit (if any), and that patient appears to be dying over the next several days to << 1 week, regardless of further treatment or intervention.  Family is agreed that they wish for the patient to be comfortable at the end of life.  In addition, copy of patient's Living Will (sent over from [facility]) was also reviewed, which very clearly specifies that in case of a terminal condition, patient would not want resuscitation, intubation, artificial nutrition/hydration, or IV antibiotics.  After further discussion, family agreed to DNR, DNI, and comfort measures only.  They voiced agreement with stopping IV antibiotics and blood draws, but wish to continue low rate IV fluids at this time (risks/benefits discussed).  Family also in agreement with starting PRN IV opioids for symptom relief (pain and SOB).  Also discussed possible consideration of inpatient hospice/IPU options if patient survives for more than 48 hours, family voiced understanding.  [facility] Care SW and Chaplaincy referrals offered and accepted.  Updated MOLST form completed.  Family was appreciative of the conversation, and all of their questions were answered.

## 2024-02-09 NOTE — CONSULT NOTE ADULT - PROBLEM SELECTOR RECOMMENDATION 9
Patient with rapid functional/clinical decline over last 4 weeks, now bedbound, minimally verbal, no longer eating/swallowing, total care in ADLs.  Patient is equivalent to FAST stage 7F with a PPS score of 10%.  She is hospice appropriate with likely prognosis of days to < 1 week.  IPU/inpatient hospice appropriate    See GOC discussion above--family in agreement with DNR, DNI, and transition to comfort measures only  No further antibiotics, they wish to continue low rate IV fluids at this time  Primary team aware, continue supportive care

## 2024-02-10 RX ADMIN — SODIUM CHLORIDE 30 MILLILITER(S): 9 INJECTION, SOLUTION INTRAVENOUS at 12:11

## 2024-02-10 NOTE — PROGRESS NOTE ADULT - ASSESSMENT
Patient is a 87yo Female with Dementia presents with AMS. P a/w hypernatremia and failure to thrive and DEVIN. Nephrology consulted for Elevated serum creatinine and sodium.     1. DEVIN- baseline SCr 0.6-0.7; DEVIN likely pre-renal in the setting of decreased PO intake. UA 30 protein with small LE. UCX <50k GNR. Renal function improving with IVF; consistent with pre-renal DEVIN. c/w IVF as below. Will defer Renal US since DEVIN is resolving. Strict I/Os. Avoid nephrotoxins/ NSAIDs/ RCA. Monitor BMP.    2. Hypernatremia- due to free water deficit. Serum Na improving  c/w D5 @ 30ml/hr.      3. Hypokalemia- s/p KCl 10meq IV x3 doses.     4. Failure to thrive- palliative following. Pt now DNR/ DNI/ Comfort measures. OK with gentle IV hydration; no further labs  Will sign off; please reconsult if needed.       [facility] NEPHROLOGY  Timbo Tate M.D.  YVETTE GaitanO.  Nilam Robertson M.D.  MD Swathi Finn, MSN, ANP-C    Telephone: [phone]  Facsimile: [phone] [place] [address], [address]  [place] [address]

## 2024-02-10 NOTE — PROGRESS NOTE ADULT - SUBJECTIVE AND OBJECTIVE BOX
[facility] NEPHROLOGY- PROGRESS NOTE    Patient is a 89yo Female with Dementia presents with AMS. P a/w hypernatremia and failure to thrive and DEVIN. Nephrology consulted for Elevated serum creatinine and sodium.       Hospital Medications: Medications reviewed.  REVIEW OF SYSTEMS: Unable to obtain due to mental status    VITALS:  T(F): 99.1 (02-10-24 @ 16:19), Max: 101.3 (02-10-24 @ 11:51)  HR: 74 (02-10-24 @ 16:19)  BP: 117/33 (02-10-24 @ 16:19)  RR: 18 (02-10-24 @ 16:19)  SpO2: 94% (02-10-24 @ 16:19)  Wt(kg): --  Height (cm): 157.5 (02-08 @ 13:02)  Weight (kg): 35.2 (02-08 @ 17:12), 45.4 (02-01 @ 21:24)  BMI (kg/m2): 14.2 (02-08 @ 17:12), 18.3 (02-08 @ 13:02)  BSA (m2): 1.28 (02-08 @ 17:12), 1.42 (02-08 @ 13:02)    PHYSICAL EXAM:  Constitutional: Obtunded  Respiratory: CTAB, no wheezes, rales or rhonchi  Cardiovascular: S1, S2, RRR  Gastrointestinal: BS+, soft, NT/ND  : No reynolds.  Extremities: No peripheral edema    LABS:  02-09  173 <--, 174 <--, 172 <--, 179 <--  173<HH>  |  144<H>  |  56<H>  ----------------------------<  170<H>  3.4<L>   |  29  |  1.66<H>    Ca    8.1<L>      09 Feb 2024 11:55  Phos  2.6     02-09  Mg     3.3     02-09      Creatinine Trend: 1.66 <--, 1.69 <--, 2.04 <--, 2.09 <--                        10.9   9.98  )-----------( 133      ( 09 Feb 2024 10:12 )             38.2     Urine Studies:  Urinalysis Basic - ( 09 Feb 2024 11:55 )    Color:  / Appearance:  / SG:  / pH:   Gluc: 170 mg/dL / Ketone:   / Bili:  / Urobili:    Blood:  / Protein:  / Nitrite:    Leuk Esterase:  / RBC:  / WBC    Sq Epi:  / Non Sq Epi:  / Bacteria:         RADIOLOGY & ADDITIONAL STUDIES:

## 2024-02-10 NOTE — CONSULT NOTE ADULT - SUBJECTIVE AND OBJECTIVE BOX
MR#[id number]  PATIENT NAME:MIK SHARPE    DATE OF SERVICE: 02-10-24 @ 22:58  Patient was seen and examined by Carlos Escobedo MD on    02-10-24 @ 22:58 .  Interim events noted.Consultant notes ,Labs,Telemetry reviewed by me       HOSPITAL COURSE: HPI:  This is an 88-year-old woman, with past medical history of dementia, presents to ED from assisted living for failure to thrive, with drowsiness for about 2 weeks, altered mental status, not eating or drinking anything for several days. Spoke to son Brenton who states he was informed that patient was minimally interactive for the past 3 weeks, not eating or drinking as often so she was switched to soft diet. For the past two days she has not been eating anything he states she "lost her ability to swallow" and attributed symptoms to advanced dementia. He was discussing hospice with HCP who is Daughter Merle Sharpe when they decided to bring her to the hospital for work yup prior to making final decision. Patient was diagnosed with UTI several days ago and initially was taking oral antibiotic but for the past 4 days she has not been cooperating with taking the oral medication at all.  No known fever or chills.  He also reports right buttock ulcer which is getting worse for about 1 week.  No vomiting/diarrhea/bleeding/trauma.  Patient is unable to provide any history.      In ED   vitals: BP: 134/73, RA  s/p Rocephin, S/P: 1LNS  wbc 11.2, NA: 179      (08 Feb 2024 23:34)      INTERIM EVENTS:Patient seen at bedside ,interim events noted.      PMH -reviewed admission note, no change since admission  HEART FAILURE: Acute[ ]Chronic[ ] Systolic[ ] Diastolic[ ] Combined Systolic and Diastolic[ ]  CAD[ ] CABG[ ] PCI[ ]  DEVICES[ ] PPM[ ] ICD[ ] ILR[ ]  ATRIAL FIBRILLATION[ ] Paroxysmal[ ] Permanent[ ] CHADS2-[  ]  DEVIN[ ] CKD1[ ] CKD2[ ] CKD3[ ] CKD4[ ] ESRD[ ]  COPD[ ] HTN[ ]   DM[ ] Type1[ ] Type 2[ ]   CVA[ ] Paresis[ ]    AMBULATION: Assisted[ ] Cane/walker[ ] Independent[ ]    MEDICATIONS  (STANDING):  dextrose 5%. 1000 milliLiter(s) (30 mL/Hr) IV Continuous <Continuous>    MEDICATIONS  (PRN):  acetaminophen  Suppository .. 650 milliGRAM(s) Rectal every 6 hours PRN Temp greater or equal to 38C (100.4F), Mild Pain (1 - 3)  bisacodyl Suppository 10 milliGRAM(s) Rectal daily PRN Constipation  glycopyrrolate Injectable 0.4 milliGRAM(s) IV Push every 6 hours PRN secretions  HYDROmorphone  Injectable 0.3 milliGRAM(s) IV Push every 4 hours PRN Moderate Pain (4 - 6)  HYDROmorphone  Injectable 0.3 milliGRAM(s) IV Push every 4 hours PRN Tachypnea (RR > 22) or labored breathing            REVIEW OF SYSTEMS:  Constitutional: [ ] fever, [ ]weight loss,  [ ]fatigue [ ]weight gain  Eyes: [ ] visual changes  Respiratory: [ ]shortness of breath;  [ ] cough, [ ]wheezing, [ ]chills, [ ]hemoptysis  Cardiovascular: [ ] chest pain, [ ]palpitations, [ ]dizziness,  [ ]leg swelling[ ]orthopnea[ ]PND  Gastrointestinal: [ ] abdominal pain, [ ]nausea, [ ]vomiting,  [ ]diarrhea [ ]Constipation [ ]Melena  Genitourinary: [ ] dysuria, [ ] hematuria [ ]Crook  Neurologic: [ ] headaches [ ] tremors[ ]weakness [ ]Paralysis Right[ ] Left[ ]  Skin: [ ] itching, [ ]burning, [ ] rashes  Endocrine: [ ] heat or cold intolerance  Musculoskeletal: [ ] joint pain or swelling; [ ] muscle, back, or extremity pain  Psychiatric: [ ] depression, [ ]anxiety, [ ]mood swings, or [ ]difficulty sleeping  Hematologic: [ ] easy bruising, [ ] bleeding gums    [ ] All remaining systems negative except as per above.   [ ]Unable to obtain.  [x] No change in ROS since admission      Vital Signs Last 24 Hrs  T(C): 37.5 (10 Feb 2024 20:21), Max: 38.5 (10 Feb 2024 11:51)  T(F): 99.5 (10 Feb 2024 20:21), Max: 101.3 (10 Feb 2024 11:51)  HR: 74 (10 Feb 2024 20:21) (65 - 74)  BP: 116/40 (10 Feb 2024 20:21) (112/46 - 124/56)  BP(mean): --  RR: 18 (10 Feb 2024 20:21) (18 - 19)  SpO2: 95% (10 Feb 2024 20:21) (86% - 98%)    Parameters below as of 10 Feb 2024 20:21  Patient On (Oxygen Delivery Method): nasal cannula  O2 Flow (L/min): 3    I&O's Summary    10 Feb 2024 07:01  -  10 Feb 2024 22:58  --------------------------------------------------------  IN: 330 mL / OUT: 0 mL / NET: 330 mL        PHYSICAL EXAM:  General: No acute distress BMI-  HEENT: EOMI, PERRL  Neck: Supple, [ ] JVD  Lungs: Equal air entry bilaterally; [ ] rales [ ] wheezing [ ] rhonchi  Heart: Regular rate and rhythm; [x ] murmur   2/6 [ x] systolic [ ] diastolic [ ] radiation[ ] rubs [ ]  gallops  Abdomen: Nontender, bowel sounds present  Extremities: No clubbing, cyanosis, [ ] edema [ ]Pulses  equal and intact  Nervous system:  Alert & Oriented X3, no focal deficits  Psychiatric: Normal affect  Skin: No rashes or lesions    LABS:  02-09    173<HH>  |  144<H>  |  56<H>  ----------------------------<  170<H>  3.4<L>   |  29  |  1.66<H>    Ca    8.1<L>      09 Feb 2024 11:55  Phos  2.6     02-09  Mg     3.3     02-09      Creatinine Trend: 1.66<--, 1.69<--, 2.04<--, 2.09<--                        10.9   9.98  )-----------( 133      ( 09 Feb 2024 10:12 )             38.2

## 2024-02-10 NOTE — PROGRESS NOTE ADULT - SUBJECTIVE AND OBJECTIVE BOX
PGY-1 Progress Note discussed with attending    PAGER #: [[phone]] TILL 5:00 PM  PLEASE CONTACT ON CALL TEAM:  - On Call Team (Please refer to Lisa) FROM 5:00 PM - 8:30PM  - Nightfloat Team FROM 8:30 -7:30 AM    INTERVAL HPI/OVERNIGHT EVENTS:   No overnight events. Patient was examined in the morning at bedside and she is in NAD    REVIEW OF SYSTEMS: Unable to obtain due to patient mental status     Vital Signs Last 24 Hrs  T(C): 38.3 (10 Feb 2024 08:33), Max: 38.3 (10 Feb 2024 08:33)  T(F): 100.9 (10 Feb 2024 08:33), Max: 100.9 (10 Feb 2024 08:33)  HR: 71 (10 Feb 2024 08:33) (60 - 105)  BP: 112/46 (10 Feb 2024 08:33) (102/47 - 128/52)  BP(mean): 65 (09 Feb 2024 11:02) (65 - 65)  RR: 19 (10 Feb 2024 08:33) (18 - 19)  SpO2: 86% (10 Feb 2024 08:33) (86% - 99%)    Parameters below as of 10 Feb 2024 08:33  Patient On (Oxygen Delivery Method): nasal cannula  O2 Flow (L/min): 3      PHYSICAL EXAMINATION:  GENERAL: NAD,   HEAD:  Atraumatic, Normocephalic  EYES:   conjunctiva and sclera clear  NECK: Supple,    LUNG: Clear to auscultation. No rales, rhonchi, wheezing, or rubs  HEART: Regular rate and rhythm; No murmurs,  ABDOMEN: Soft, Nontender, Nondistended; Bowel sounds present, No Rovsing's sign   NERVOUS SYSTEM:  Alert & Oriented X1,    EXTREMITIES:  2+ Peripheral Pulses, No clubbing, cyanosis, or edema  CALF: No Calf tenderness   SKIN: warm dry                          10.9   9.98  )-----------( 133      ( 09 Feb 2024 10:12 )             38.2     02-09    173<HH>  |  144<H>  |  56<H>  ----------------------------<  170<H>  3.4<L>   |  29  |  1.66<H>    Ca    8.1<L>      09 Feb 2024 11:55  Phos  2.6     02-09  Mg     3.3     02-09    TPro  8.3  /  Alb  2.1<L>  /  TBili  1.1  /  DBili  x   /  AST  41<H>  /  ALT  40  /  AlkPhos  85  02-08    LIVER FUNCTIONS - ( 08 Feb 2024 14:30 )  Alb: 2.1 g/dL / Pro: 8.3 g/dL / ALK PHOS: 85 U/L / ALT: 40 U/L DA / AST: 41 U/L / GGT: x                   CAPILLARY BLOOD GLUCOSE      RADIOLOGY & ADDITIONAL TESTS:

## 2024-02-10 NOTE — CHART NOTE - NSCHARTNOTEFT_GEN_A_CORE
Focus note: 88F admit w/ BMI 14.2/ FTT. NPO day #3, Unstageable pressure ulcer (sacrum/ coccyx). Pt seen by Palliative care MD, notes pt severely malnourished (in agreement), grossly cachectic w/severe loss muscle mass (temporal, chest wall). Pt DNR/ DNI, comfort measures. NPO, low level IVF, noted. MD to monitor. RD available. Focus note: 88F admit w/ BMI 14.2/ FTT. NPO day #3, Unstageable pressure ulcer (sacrum/ coccyx). Pt seen by Palliative care MD, notes pt severely malnourished (in agreement), grossly cachectic w/severe loss muscle mass and severe loss body fat (temporal, chest wall). Pt DNR/ DNI, comfort measures. NPO, low level IVF, noted. MD to monitor. RD available.

## 2024-02-10 NOTE — CONSULT NOTE ADULT - CONSULT REASON
Hypernatremia
Complex medical decision making in the context of rapidly progressive dementia, severe hypernatremia, and UTI
Hypernatremia

## 2024-02-11 RX ADMIN — SODIUM CHLORIDE 30 MILLILITER(S): 9 INJECTION, SOLUTION INTRAVENOUS at 18:19

## 2024-02-11 NOTE — PROGRESS NOTE ADULT - SUBJECTIVE AND OBJECTIVE BOX
PGY-1 Progress Note discussed with attending      PLEASE CONTACT ON CALL TEAM:  - On Call Team (Please refer to Lisa) FROM 5:00 PM - 8:30PM  - Nightfloat Team FROM 8:30 -7:30 AM    INTERVAL HPI/OVERNIGHT EVENTS:       REVIEW OF SYSTEMS:  CONSTITUTIONAL: No fever, weight loss, or fatigue  RESPIRATORY: No cough, wheezing,  or hemoptysis; No shortness of breath  CARDIOVASCULAR: No chest pain, palpitations, dizziness, or leg swelling  GASTROINTESTINAL: No abdominal pain. No nausea, vomiting, or hematemesis; No diarrhea or constipation. No melena or hematochezia.  GENITOURINARY: No dysuria, hematuria, or urinary frequency  NEUROLOGICAL: No headaches, memory loss. No focal weakness, numbness, or tremors  SKIN: No itching, burning, or rashes    MEDICATIONS  (STANDING):  dextrose 5%. 1000 milliLiter(s) (30 mL/Hr) IV Continuous <Continuous>    MEDICATIONS  (PRN):  acetaminophen  Suppository .. 650 milliGRAM(s) Rectal every 6 hours PRN Temp greater or equal to 38C (100.4F), Mild Pain (1 - 3)  bisacodyl Suppository 10 milliGRAM(s) Rectal daily PRN Constipation  glycopyrrolate Injectable 0.4 milliGRAM(s) IV Push every 6 hours PRN secretions  HYDROmorphone  Injectable 0.3 milliGRAM(s) IV Push every 4 hours PRN Tachypnea (RR > 22) or labored breathing  HYDROmorphone  Injectable 0.3 milliGRAM(s) IV Push every 4 hours PRN Moderate Pain (4 - 6)      Vital Signs Last 24 Hrs  T(C): 36.4 (11 Feb 2024 04:38), Max: 38.5 (10 Feb 2024 11:51)  T(F): 97.5 (11 Feb 2024 04:38), Max: 101.3 (10 Feb 2024 11:51)  HR: 67 (11 Feb 2024 04:38) (67 - 74)  BP: 92/31 (11 Feb 2024 04:38) (92/31 - 123/46)  BP(mean): --  RR: 18 (11 Feb 2024 04:38) (18 - 19)  SpO2: 95% (11 Feb 2024 04:38) (86% - 95%)    Parameters below as of 11 Feb 2024 04:38  Patient On (Oxygen Delivery Method): nasal cannula  O2 Flow (L/min): 3      PHYSICAL EXAMINATION:  GENERAL: NAD, lying in bed  HEAD: Atraumatic, Normocephalic  EYES: Conjunctiva and sclera clear  NECK: Supple. No JVD. Normal thyroid  CHEST/LUNG: Clear to auscultation. No rales or wheezing  HEART: Regular rate and rhythm. No abnormal heart sounds  ABDOMEN: Soft, nontender, and nondistended. Bowel sounds present. No pain or masses on palpation  NERVOUS SYSTEM: Alert & Oriented X3  EXTREMITIES:  2+ Peripheral Pulses. No appreciable edema  SKIN: Warm, dry                          10.9   9.98  )-----------( 133      ( 09 Feb 2024 10:12 )             38.2     02-09    173<HH>  |  144<H>  |  56<H>  ----------------------------<  170<H>  3.4<L>   |  29  |  1.66<H>    Ca    8.1<L>      09 Feb 2024 11:55  Phos  2.6     02-09  Mg     3.3     02-09                I&O's Summary    10 Feb 2024 07:01  -  11 Feb 2024 07:00  --------------------------------------------------------  IN: 330 mL / OUT: 300 mL / NET: 30 mL          Culture - Urine (collected 08 Feb 2024 20:20)  Source: Clean Catch Clean Catch (Midstream)  Preliminary Report (10 Feb 2024 10:28):    10,000 - 49,000 CFU/mL Gram Negative Rods        CAPILLARY BLOOD GLUCOSE      RADIOLOGY & ADDITIONAL TESTS:                   PGY-1 Progress Note discussed with attending      PLEASE CONTACT ON CALL TEAM:  - On Call Team (Please refer to Lisa) FROM 5:00 PM - 8:30PM  - Nightfloat Team FROM 8:30 -7:30 AM    INTERVAL HPI/OVERNIGHT EVENTS:   No acute events overnight.  Patient examined at bedside this AM and is in NAD.      REVIEW OF SYSTEMS:  Unable to obtain     MEDICATIONS  (STANDING):  dextrose 5%. 1000 milliLiter(s) (30 mL/Hr) IV Continuous <Continuous>    MEDICATIONS  (PRN):  acetaminophen  Suppository .. 650 milliGRAM(s) Rectal every 6 hours PRN Temp greater or equal to 38C (100.4F), Mild Pain (1 - 3)  bisacodyl Suppository 10 milliGRAM(s) Rectal daily PRN Constipation  glycopyrrolate Injectable 0.4 milliGRAM(s) IV Push every 6 hours PRN secretions  HYDROmorphone  Injectable 0.3 milliGRAM(s) IV Push every 4 hours PRN Tachypnea (RR > 22) or labored breathing  HYDROmorphone  Injectable 0.3 milliGRAM(s) IV Push every 4 hours PRN Moderate Pain (4 - 6)      Vital Signs Last 24 Hrs  T(C): 36.4 (11 Feb 2024 04:38), Max: 38.5 (10 Feb 2024 11:51)  T(F): 97.5 (11 Feb 2024 04:38), Max: 101.3 (10 Feb 2024 11:51)  HR: 67 (11 Feb 2024 04:38) (67 - 74)  BP: 92/31 (11 Feb 2024 04:38) (92/31 - 123/46)  BP(mean): --  RR: 18 (11 Feb 2024 04:38) (18 - 19)  SpO2: 95% (11 Feb 2024 04:38) (86% - 95%)    Parameters below as of 11 Feb 2024 04:38  Patient On (Oxygen Delivery Method): nasal cannula  O2 Flow (L/min): 3      PHYSICAL EXAMINATION:  GENERAL: NAD,   HEAD:  Atraumatic, Normocephalic  EYES:   conjunctiva and sclera clear  NECK: Supple,    LUNG: Clear to auscultation. No rales, rhonchi, wheezing, or rubs  HEART: Regular rate and rhythm; No murmurs,  ABDOMEN: Soft, Nontender, Nondistended; Bowel sounds present, No Rovsing's sign   NERVOUS SYSTEM:  Alert & Oriented X1,    EXTREMITIES:  2+ Peripheral Pulses, No clubbing, cyanosis, or edema  CALF: No Calf tenderness   SKIN: warm dry                          10.9   9.98  )-----------( 133      ( 09 Feb 2024 10:12 )             38.2     02-09    173<HH>  |  144<H>  |  56<H>  ----------------------------<  170<H>  3.4<L>   |  29  |  1.66<H>    Ca    8.1<L>      09 Feb 2024 11:55  Phos  2.6     02-09  Mg     3.3     02-09                I&O's Summary    10 Feb 2024 07:01  -  11 Feb 2024 07:00  --------------------------------------------------------  IN: 330 mL / OUT: 300 mL / NET: 30 mL          Culture - Urine (collected 08 Feb 2024 20:20)  Source: Clean Catch Clean Catch (Midstream)  Preliminary Report (10 Feb 2024 10:28):    10,000 - 49,000 CFU/mL Gram Negative Rods        CAPILLARY BLOOD GLUCOSE      RADIOLOGY & ADDITIONAL TESTS:

## 2024-02-12 ENCOUNTER — TRANSCRIPTION ENCOUNTER (OUTPATIENT)
Age: 89
End: 2024-02-12

## 2024-02-12 LAB
-  AMOXICILLIN/CLAVULANIC ACID: SIGNIFICANT CHANGE UP
-  AMPICILLIN/SULBACTAM: SIGNIFICANT CHANGE UP
-  AMPICILLIN: SIGNIFICANT CHANGE UP
-  AZTREONAM: SIGNIFICANT CHANGE UP
-  CEFAZOLIN: SIGNIFICANT CHANGE UP
-  CEFEPIME: SIGNIFICANT CHANGE UP
-  CEFOXITIN: SIGNIFICANT CHANGE UP
-  CEFTRIAXONE: SIGNIFICANT CHANGE UP
-  CEFUROXIME: SIGNIFICANT CHANGE UP
-  CIPROFLOXACIN: SIGNIFICANT CHANGE UP
-  ERTAPENEM: SIGNIFICANT CHANGE UP
-  GENTAMICIN: SIGNIFICANT CHANGE UP
-  IMIPENEM: SIGNIFICANT CHANGE UP
-  LEVOFLOXACIN: SIGNIFICANT CHANGE UP
-  MEROPENEM: SIGNIFICANT CHANGE UP
-  NITROFURANTOIN: SIGNIFICANT CHANGE UP
-  PIPERACILLIN/TAZOBACTAM: SIGNIFICANT CHANGE UP
-  TOBRAMYCIN: SIGNIFICANT CHANGE UP
-  TRIMETHOPRIM/SULFAMETHOXAZOLE: SIGNIFICANT CHANGE UP
CULTURE RESULTS: ABNORMAL
METHOD TYPE: SIGNIFICANT CHANGE UP
ORGANISM # SPEC MICROSCOPIC CNT: ABNORMAL
ORGANISM # SPEC MICROSCOPIC CNT: ABNORMAL
SPECIMEN SOURCE: SIGNIFICANT CHANGE UP

## 2024-02-12 PROCEDURE — 99233 SBSQ HOSP IP/OBS HIGH 50: CPT

## 2024-02-12 RX ORDER — HYDROMORPHONE HYDROCHLORIDE 2 MG/ML
0.3 INJECTION INTRAMUSCULAR; INTRAVENOUS; SUBCUTANEOUS
Refills: 0 | Status: ACTIVE | OUTPATIENT
Start: 2024-02-12 | End: 2024-02-19

## 2024-02-12 RX ORDER — HYDROMORPHONE HYDROCHLORIDE 2 MG/ML
0.3 INJECTION INTRAMUSCULAR; INTRAVENOUS; SUBCUTANEOUS EVERY 6 HOURS
Refills: 0 | Status: ACTIVE | OUTPATIENT
Start: 2024-02-12 | End: 2024-02-19

## 2024-02-12 RX ADMIN — HYDROMORPHONE HYDROCHLORIDE 0.3 MILLIGRAM(S): 2 INJECTION INTRAMUSCULAR; INTRAVENOUS; SUBCUTANEOUS at 18:07

## 2024-02-12 RX ADMIN — HYDROMORPHONE HYDROCHLORIDE 0.3 MILLIGRAM(S): 2 INJECTION INTRAMUSCULAR; INTRAVENOUS; SUBCUTANEOUS at 18:12

## 2024-02-12 NOTE — PROGRESS NOTE ADULT - ASSESSMENT
88-year-old woman, with PMHx of advanced dementia, [facility] from [facility] with failure to thrive, with drowsiness for about 2 weeks, altered mental status, not eating or drinking anything for several days.   Found to have Na of 179 in ER.  Patient admitted for severe hypernatremia, DEVIN, FTT, and presumed UTI in the setting of advanced dementia.  Started on IV Rocephin and gentle IV hydration with D5W.  Palliative Care consultation requested for medical decision making and additional GOC discussions.    Patient now appears to be actively dying, family has agreed to DNR, DNI, and comfort measures only.

## 2024-02-12 NOTE — PROGRESS NOTE ADULT - ASSESSMENT
88F, with PMHx dementia (baseline AAOx1), presents to ED from assisted living for failure to thrive, with drowsiness for about 2 weeks, altered mental status, not eating or drinking anything for several days. Spoke to son Brenton who states he was informed that patient was minimally interactive for the past 3 weeks, not eating or drinking. Found to have Na 179, WBC 11k, and UA+. Admitted for for hypernatremia, failure to thrive, and UTI. Now Comfort Care, MEWS exempt. As per family request, continuing IVF only. Family inquiring about hospice, will follow up with Palliative.

## 2024-02-12 NOTE — PROGRESS NOTE ADULT - ASSESSMENT
88F, with PMHx dementia (baseline AAOx1), presents to ED from assisted living for failure to thrive, with drowsiness for about 2 weeks, altered mental status, not eating or drinking anything for several days. Spoke to son Brenton who states he was informed that patient was minimally interactive for the past 3 weeks, not eating or drinking. Found to have Na 179, WBC 11k, and UA+. Admitted for for hypernatremia, failure to thrive, and UTI. Now Comfort Care, MEWS exempt. As per family request, continuing IVF only. 88F, with PMHx dementia (baseline AAOx1), presents to ED from assisted living for failure to thrive, with drowsiness for about 2 weeks, altered mental status, not eating or drinking anything for several days. Spoke to son Brenton who states he was informed that patient was minimally interactive for the past 3 weeks, not eating or drinking. Found to have Na 179, WBC 11k, and UA+. Admitted for for hypernatremia, failure to thrive, and UTI. Now Comfort Care, MEWS exempt. As per family request, continuing IVF only. Family inquiring about hospice, will follow up with Palliative.

## 2024-02-12 NOTE — DISCHARGE NOTE PROVIDER - CARE PROVIDER_API CALL
Samy Daniels Cale  Hospice/Palliative Medicine  [address] [address] [place] [id number]  Phone: [phone]  Fax: [phone]  Follow Up Time:

## 2024-02-12 NOTE — DISCHARGE NOTE PROVIDER - NSDCCPCAREPLAN_GEN_ALL_CORE_FT
PRINCIPAL DISCHARGE DIAGNOSIS  Diagnosis: Hypernatremia  Assessment and Plan of Treatment: You presented with elevated serum sodium levels. You were treated with IV fluids.      SECONDARY DISCHARGE DIAGNOSES  Diagnosis: Encounter for hospice care discussion  Assessment and Plan of Treatment: The Palliative Care team was consulted during your hospital stay. Through shared decision making, decision was made for DNR/DNI Comfort measures. You were started on medications to keep you comfortable and no more blood work were to be drawn. Please CONTINUE those medications on discharge.  You are keep NPO (nothing by mouth currently) due to aspiration risk. Please maintain strict aspiration precautions, including careful hand-feeding, head of the bed 45 degrees to reduce aspiration risk.       Diagnosis: Decubitus ulcer  Assessment and Plan of Treatment: You presented with decubitus ulcers. Wound care team assessed and treated your wounds.

## 2024-02-12 NOTE — ADVANCED PRACTICE NURSE CONSULT - ASSESSMENT
This is a 88yr old female patient admitted for Hypernatremia, presenting with the following:  -There is an intact Kevin Terminal Ulcer to the L. Ear and Bilateral Heels without drainage  -There is a Kevin Terminal Ulcer to the Sacrococcyx area with epidermal separation, slough, pink tissue, and drainage  -The patient is with pressure injury prevention resources in place  -The patient is being followed by Palliative Care with Hospice consideration

## 2024-02-12 NOTE — ADVANCED PRACTICE NURSE CONSULT - RECOMMEDATIONS
-Clean all wounds with normal saline and apply skin prep to the surrounding skin  -Leave the L. Ear and Bilateral Heel wounds open to air  -Offload the patients L. Ear  -Apply Silver Calcium Alginate (Aquacel Ag) to the Sacrococcyx wound and cover with a Foam dressing Q 72hrs PRN  -Elevate/float the patients heels using heel protectors and reposition the patient Q 2hrs using wedges or pillows

## 2024-02-12 NOTE — PROGRESS NOTE ADULT - SUBJECTIVE AND OBJECTIVE BOX
follow up on:  complex medical decision making related to goals of care    [facility] and Palliative Consult Service:  Debra Araujo DO: cell ([phone])  Samy Daniels MD: cell ([phone])  Ronald Cates NP: cell ([phone])   El Anaya LMSW: cell ([phone])   Gillian Vaughan NP: via [employer] Teams    Can contact any Palliative Team member via [employer] Teams for consults and questions      OVERNIGHT EVENTS:  None.  No PRN meds given within last 48 hours.  Last PRN use of IV Dilaudid was on 2/9.    Patient examined with son Brenton at bedside.  Remains alert and oriented x zero, minimally responsive, occasional vocalization noted. Overall appears comfortable, respirations minimally labored, no overt signs of chest pain or SOB noted.  No other acute complaints.      Present Symptoms: Mild, Moderate, Severe  Pain:  Unable to verbalize, CPOT 1      Review of Systems:  Unable to obtain due to poor mentation/lethargy/dementia    CPOT:  1  https://ccs-sth.org/resources/Documents/Sedation%20Analgesia%20Delirium%20in%20CC/CCS%20STH%20Guideline%20template%20CPOT.pdf  PAIN AD Score:  2  http://geriatrictoolkit.[place].[facility]/cog/painad.pdf (press ctrl +  left click to view)    MEDICATIONS  (STANDING):  dextrose 5%. 1000 milliLiter(s) (30 mL/Hr) IV Continuous <Continuous>    MEDICATIONS  (PRN):  acetaminophen  Suppository .. 650 milliGRAM(s) Rectal every 6 hours PRN Temp greater or equal to 38C (100.4F), Mild Pain (1 - 3)  bisacodyl Suppository 10 milliGRAM(s) Rectal daily PRN Constipation  glycopyrrolate Injectable 0.4 milliGRAM(s) IV Push every 6 hours PRN secretions  HYDROmorphone  Injectable 0.3 milliGRAM(s) IV Push every 4 hours PRN Moderate Pain (4 - 6)  HYDROmorphone  Injectable 0.3 milliGRAM(s) IV Push every 4 hours PRN Tachypnea (RR > 22) or labored breathing      PHYSICAL EXAM:  Vital Signs Last 24 Hrs  T(C): 36.3 (12 Feb 2024 08:03), Max: 37 (12 Feb 2024 04:38)  T(F): 97.3 (12 Feb 2024 08:03), Max: 98.6 (12 Feb 2024 04:38)  HR: 64 (12 Feb 2024 08:03) (52 - 64)  BP: 132/63 (12 Feb 2024 08:03) (114/48 - 132/63)  BP(mean): --  RR: 19 (12 Feb 2024 08:03) (17 - 19)  SpO2: 94% (12 Feb 2024 08:03) (91% - 95%)    Parameters below as of 12 Feb 2024 08:03  Patient On (Oxygen Delivery Method): nasal cannula  O2 Flow (L/min): 3      General: alert  oriented x __0__    lethargic minimally responsive, + cachectic with temporal wasting, in NAD    Palliative Performance Scale/Karnofsky Score:  10%  http://npcrc.org/files/news/palliative_performance_scale_ppsv2.pdf    HEENT:  anicteric, pharynx clear, MM sl dry  Lungs:  overall clear to auscultation, respirations minimally labored, no congestion noted  CV: RRR, normal S1 and S2, no murmur  GI: soft non distended non tender  + bowel sounds  : incontinent    Musculoskeletal: weakness x4  in all extremities, bedbound, no edema noted, arms folded across chest  Skin:  + Kevin terminal ulcer to L ear and bilateral heels, + Kevin terminal ulcer to sacrococcygeal area with epidermal separation, slough, and drainage, + poor skin turgor  Neuro: no  focal deficits, + severe cognitive impairment  + dysphagia  Oral intake ability: unable/mouth care only      LABS:    Albumin: 2.1 g/dL (02.08.24 @ 14:30)        RADIOLOGY & ADDITIONAL STUDIES:

## 2024-02-12 NOTE — CHART NOTE - NSCHARTNOTEFT_GEN_A_CORE
Patient reexamined this afternoon, with daughter/HCP Nai at bedside, son Bashir joined by phone.  Both children expressed concern about patient returning to [facility] with home hospice services, specifically that she would "starve to death" if she was sent home without IV fluids.    Again explained risks/burdens/benefits of IV hydration at the EOL; reinforced to son and daughter that patient's appetite and thirst are shutting down as part of the natural dying process from her underlying dementia, and that lack of IV fluids does not cause suffering or physical discomfort at the end of life.  Discussed that patients frequently are more comfortable once IV fluids are stopped, discussed increased risks of pulmonary congestion and fluid retention the longer IV fluids are maintained.  Daughter and I also discussed that patient's respirations have appeared to become more labored this afternoon.  Reviewed various settings for EOL care, including home hospice vs IPU, daughter informed that patient does not meet strict IPU criteria at this time.    Recommend:  Will hold off on discharge planning to [facility] at this time (also no nurse available for hospice admission until Wednesday), will reassess in AM  After discussion with son and daughter, in agreement to stop IV fluids for comfort  Change IV Dilaudid to 0.3 mg Q 6 hrs ATC, with 0.3 mg Q 2 PRN breakthrough dyspnea  Plan of care discussed with residents/medical team in detail, SW aware to hold discharge  Daughter and son were appreciative of the conversation, all of their questions were answered  Palliative Care team will continue to follow

## 2024-02-12 NOTE — PROGRESS NOTE ADULT - SUBJECTIVE AND OBJECTIVE BOX
MR#[id number]  PATIENT NAME:MIK SHARPE    DATE OF SERVICE: 02-12-24  Patient was seen and examined by Carlos Escobedo MD on    02-12-24   Interim events noted.Consultant notes ,Labs,Telemetry reviewed by me       HOSPITAL COURSE: HPI:  This is an 88-year-old woman, with past medical history of dementia, presents to ED from assisted living for failure to thrive, with drowsiness for about 2 weeks, altered mental status, not eating or drinking anything for several days. Spoke to son Brenton who states he was informed that patient was minimally interactive for the past 3 weeks, not eating or drinking as often so she was switched to soft diet. For the past two days she has not been eating anything he states she "lost her ability to swallow" and attributed symptoms to advanced dementia. He was discussing hospice with HCP who is Daughter Merle Sharpe when they decided to bring her to the hospital for work yup prior to making final decision. Patient was diagnosed with UTI several days ago and initially was taking oral antibiotic but for the past 4 days she has not been cooperating with taking the oral medication at all.  No known fever or chills.  He also reports right buttock ulcer which is getting worse for about 1 week.  No vomiting/diarrhea/bleeding/trauma.  Patient is unable to provide any history.      In ED   vitals: BP: 134/73, RA  s/p Rocephin, S/P: 1LNS  wbc 11.2, NA: 179      (08 Feb 2024 23:34)      INTERIM EVENTS:Patient seen at bedside ,interim events noted.      PMH -reviewed admission note, no change since admission  HEART FAILURE: Acute[ ]Chronic[ ] Systolic[ ] Diastolic[ ] Combined Systolic and Diastolic[ ]  CAD[ ] CABG[ ] PCI[ ]  DEVICES[ ] PPM[ ] ICD[ ] ILR[ ]  ATRIAL FIBRILLATION[ ] Paroxysmal[ ] Permanent[ ] CHADS2-[  ]  DEVIN[ ] CKD1[ ] CKD2[ ] CKD3[ ] CKD4[ ] ESRD[ ]  COPD[ ] HTN[ ]   DM[ ] Type1[ ] Type 2[ ]   CVA[ ] Paresis[ ]    AMBULATION: Assisted[ ] Cane/walker[ ] Independent[ ]    MEDICATIONS  (STANDING):  HYDROmorphone  Injectable 0.3 milliGRAM(s) IV Push every 6 hours    MEDICATIONS  (PRN):  acetaminophen  Suppository .. 650 milliGRAM(s) Rectal every 6 hours PRN Temp greater or equal to 38C (100.4F), Mild Pain (1 - 3)  bisacodyl Suppository 10 milliGRAM(s) Rectal daily PRN Constipation  glycopyrrolate Injectable 0.4 milliGRAM(s) IV Push every 6 hours PRN secretions  HYDROmorphone  Injectable 0.3 milliGRAM(s) IV Push every 4 hours PRN Tachypnea (RR > 22) or labored breathing  HYDROmorphone  Injectable 0.3 milliGRAM(s) IV Push every 2 hours PRN Moderate Pain (4 - 6)            REVIEW OF SYSTEMS:  Constitutional: [ ] fever, [ ]weight loss,  [ ]fatigue [ ]weight gain  Eyes: [ ] visual changes  Respiratory: [ ]shortness of breath;  [ ] cough, [ ]wheezing, [ ]chills, [ ]hemoptysis  Cardiovascular: [ ] chest pain, [ ]palpitations, [ ]dizziness,  [ ]leg swelling[ ]orthopnea[ ]PND  Gastrointestinal: [ ] abdominal pain, [ ]nausea, [ ]vomiting,  [ ]diarrhea [ ]Constipation [ ]Melena  Genitourinary: [ ] dysuria, [ ] hematuria [ ]Crook  Neurologic: [ ] headaches [ ] tremors[ ]weakness [ ]Paralysis Right[ ] Left[ ]  Skin: [ ] itching, [ ]burning, [ ] rashes  Endocrine: [ ] heat or cold intolerance  Musculoskeletal: [ ] joint pain or swelling; [ ] muscle, back, or extremity pain  Psychiatric: [ ] depression, [ ]anxiety, [ ]mood swings, or [ ]difficulty sleeping  Hematologic: [ ] easy bruising, [ ] bleeding gums    [ ] All remaining systems negative except as per above.   [ ]Unable to obtain.  [x] No change in ROS since admission      Vital Signs Last 24 Hrs  T(C): 36.5 (12 Feb 2024 20:05), Max: 37 (12 Feb 2024 04:38)  T(F): 97.7 (12 Feb 2024 20:05), Max: 98.6 (12 Feb 2024 04:38)  HR: 68 (12 Feb 2024 20:05) (61 - 68)  BP: 106/52 (12 Feb 2024 20:05) (106/52 - 132/63)  BP(mean): --  RR: 17 (12 Feb 2024 20:05) (17 - 19)  SpO2: 90% (12 Feb 2024 20:05) (90% - 95%)    Parameters below as of 12 Feb 2024 20:05  Patient On (Oxygen Delivery Method): nasal cannula  O2 Flow (L/min): 3    I&O's Summary    11 Feb 2024 07:01  -  12 Feb 2024 07:00  --------------------------------------------------------  IN: 360 mL / OUT: 0 mL / NET: 360 mL        PHYSICAL EXAM:  General: No acute distress BMI-  HEENT: EOMI, PERRL  Neck: Supple, [ ] JVD  Lungs: Equal air entry bilaterally; [ ] rales [ ] wheezing [ ] rhonchi  Heart: Regular rate and rhythm; [x ] murmur   2/6 [ x] systolic [ ] diastolic [ ] radiation[ ] rubs [ ]  gallops  Abdomen: Nontender, bowel sounds present  Extremities: No clubbing, cyanosis, [ ] edema [ ]Pulses  equal and intact  Nervous system:  Alert & Oriented X3, no focal deficits  Psychiatric: Normal affect  Skin: No rashes or lesions    LABS:        Creatinine Trend: 1.66<--, 1.69<--, 2.04<--, 2.09<--

## 2024-02-12 NOTE — DISCHARGE NOTE PROVIDER - NSDCMRMEDTOKEN_GEN_ALL_CORE_FT
memantine 10 mg oral tablet: 1 tab(s) orally once a day at 8 PM  omeprazole 20 mg oral delayed release capsule: 1 cap(s) orally once a day (in the morning)  senna (sennosides) 8.6 mg oral tablet: 1 tab(s) orally once a day  Tab-A-Nicol oral tablet: 1 tab(s) orally once a day   acetaminophen 650 mg rectal suppository: 1 suppository(ies) rectal every 6 hours As needed Temp greater or equal to 38C (100.4F), Mild Pain (1 - 3)  bisacodyl 10 mg rectal suppository: 1 suppository(ies) rectal once a day As needed Constipation  glycopyrrolate 0.2 mg/mL injectable solution: 0.4 milligram(s) injectable every 6 hours as needed for secretions  HYDROmorphone: 0.3 milligram(s) intravenous every 6 hours  HYDROmorphone: 0.3 milligram(s) intravenous every 1 to 2 hours as needed for  moderate pain

## 2024-02-12 NOTE — DISCHARGE NOTE PROVIDER - HOSPITAL COURSE
88F, with PMHx dementia (baseline AAOx1), presents to ED from assisted living for failure to thrive, with drowsiness for about 2 weeks, altered mental status, not eating or drinking anything for several days. Spoke to son Brenton who states he was informed that patient was minimally interactive for the past 3 weeks, not eating or drinking. Found to have Na 179, WBC 11k, and UA+. Admitted for for hypernatremia, failure to thrive, and UTI. Now Comfort Care, MEWS exempt. As per family request, continuing IVF only. Family inquiring about hospice, will follow up with Palliative. 88F, with PMHx dementia (baseline AAOx1), presents to ED from assisted living for failure to thrive, with drowsiness for about 2 weeks, altered mental status, not eating or drinking anything for several days. Spoke to son Brenton who states he was informed that patient was minimally interactive for the past 3 weeks, not eating or drinking. Found to have Na 179, WBC 11k, and UA+. Admitted for hypernatremia, failure to thrive, and UTI. With collaboration with Palliative Care team, decision made for Comfort Care, MEWS exempt. As per family request, continuing IVF only. Family interested in pursuing hospice.    Patient is stable for discharge to [facility] with home hospice. Please refer to patient's complete medical chart with documents for a full hospital course, for this is only a brief summary.       88F, with PMHx dementia (baseline AAOx1), presents to ED from assisted living for failure to thrive, with drowsiness for about 2 weeks, altered mental status, not eating or drinking anything for several days. Spoke to son Brenton who states he was informed that patient was minimally interactive for the past 3 weeks, not eating or drinking. Found to have Na 179, WBC 11k, and UA+. Admitted for hypernatremia, failure to thrive, and UTI. With collaboration with Palliative Care team, decision made for Comfort Care, MEWS exempt. As per family request, continuing IVF only. Family interested in pursuing hospice.    Patient to be discharged to inpatient hopsice. Please refer to patient's complete medical chart with documents for a full hospital course, for this is only a brief summary.

## 2024-02-12 NOTE — PROGRESS NOTE ADULT - SUBJECTIVE AND OBJECTIVE BOX
PGY-1 Progress Note discussed with attending.    PAGER #: [[phone]] TILL 5:00 PM  PLEASE CONTACT ON CALL TEAM:  - On Call Team (Please refer to Lisa) FROM 5:00 PM - 8:30PM  - Nightfloat Team FROM 8:30 -7:30 AM      INTERVAL HPI/OVERNIGHT EVENTS: Patient seen and examined at bedside. No acute overnight events.      REVIEW OF SYSTEMS:  UNABLE TO OBTAIN DUE TO PATIENT'S MENTAL STATUS.    Vital Signs Last 24 Hrs  T(C): 36.3 (12 Feb 2024 08:03), Max: 37.2 (11 Feb 2024 11:25)  T(F): 97.3 (12 Feb 2024 08:03), Max: 99 (11 Feb 2024 11:25)  HR: 64 (12 Feb 2024 08:03) (52 - 64)  BP: 132/63 (12 Feb 2024 08:03) (114/48 - 132/63)  BP(mean): --  RR: 19 (12 Feb 2024 08:03) (17 - 19)  SpO2: 94% (12 Feb 2024 08:03) (91% - 98%)    Parameters below as of 12 Feb 2024 08:03  Patient On (Oxygen Delivery Method): nasal cannula  O2 Flow (L/min): 3      PHYSICAL EXAMINATION:  GENERAL: uncomfortable, moaning, frail elderly woman, malnourished.  HEAD:  Atraumatic, Normocephalic.  EYES:  Conjunctiva and sclera clear.  NECK: Supple, No JVD, Normal thyroid.  CHEST/LUNG: Clear to auscultation. Clear to percussion bilaterally. No rales, rhonchi, wheezing, or rubs.  HEART: Regular rate and rhythm. No murmurs, rubs, or gallops.  ABDOMEN: Soft, Nontender, Nondistended. Bowel sounds present.  NERVOUS SYSTEM:  Alert & Oriented X0, responsive only to painful stimuli.  EXTREMITIES:  2+ Peripheral Pulses. No clubbing, cyanosis, or edema.  SKIN: Warm, dry.            CAPILLARY BLOOD GLUCOSE:      RADIOLOGY & ADDITIONAL TESTS:

## 2024-02-13 PROCEDURE — 99497 ADVNCD CARE PLAN 30 MIN: CPT | Mod: 25

## 2024-02-13 PROCEDURE — 99233 SBSQ HOSP IP/OBS HIGH 50: CPT

## 2024-02-13 RX ADMIN — HYDROMORPHONE HYDROCHLORIDE 0.3 MILLIGRAM(S): 2 INJECTION INTRAMUSCULAR; INTRAVENOUS; SUBCUTANEOUS at 06:04

## 2024-02-13 RX ADMIN — HYDROMORPHONE HYDROCHLORIDE 0.3 MILLIGRAM(S): 2 INJECTION INTRAMUSCULAR; INTRAVENOUS; SUBCUTANEOUS at 13:01

## 2024-02-13 RX ADMIN — HYDROMORPHONE HYDROCHLORIDE 0.3 MILLIGRAM(S): 2 INJECTION INTRAMUSCULAR; INTRAVENOUS; SUBCUTANEOUS at 19:08

## 2024-02-13 RX ADMIN — HYDROMORPHONE HYDROCHLORIDE 0.3 MILLIGRAM(S): 2 INJECTION INTRAMUSCULAR; INTRAVENOUS; SUBCUTANEOUS at 00:44

## 2024-02-13 RX ADMIN — HYDROMORPHONE HYDROCHLORIDE 0.3 MILLIGRAM(S): 2 INJECTION INTRAMUSCULAR; INTRAVENOUS; SUBCUTANEOUS at 07:17

## 2024-02-13 RX ADMIN — HYDROMORPHONE HYDROCHLORIDE 0.3 MILLIGRAM(S): 2 INJECTION INTRAMUSCULAR; INTRAVENOUS; SUBCUTANEOUS at 20:00

## 2024-02-13 NOTE — PROGRESS NOTE ADULT - ASSESSMENT
88-year-old woman, with PMHx of advanced dementia, presented to [facility] ED yesterday 2/8 from [facility] with failure to thrive, with drowsiness for about 2 weeks, altered mental status, not eating or drinking anything for several days.   Found to have Na of 179 in ER.  Patient admitted for severe hypernatremia, DEVIN, FTT, and presumed UTI in the setting of advanced dementia.  Started on IV Rocephin and gentle IV hydration with D5W.  Palliative Care consultation requested for medical decision making and additional GOC discussions.    Patient now appears to be actively dying, family has agreed to DNR, DNI, and comfort measures only.  Now agreeable to inpatient hospice/IPU setting for EOL care.

## 2024-02-13 NOTE — PROGRESS NOTE ADULT - SUBJECTIVE AND OBJECTIVE BOX
MR#[id number]  PATIENT NAME:-ROSY SHARPE    DATE OF SERVICE: 02-13-24   Patient was seen and examined by Carlos Escobedo MD on    02-13-24        HOSPITAL COURSE: HPI:  This is an 88-year-old woman, with past medical history of dementia, presents to ED from assisted living for failure to thrive, with drowsiness for about 2 weeks, altered mental status, not eating or drinking anything for several days. Spoke to son Brenton who states he was informed that patient was minimally interactive for the past 3 weeks, not eating or drinking as often so she was switched to soft diet. For the past two days she has not been eating anything he states she "lost her ability to swallow" and attributed symptoms to advanced dementia. He was discussing hospice with HCP who is Daughter Merle Sharpe when they decided to bring her to the hospital for work yup prior to making final decision. Patient was diagnosed with UTI several days ago and initially was taking oral antibiotic but for the past 4 days she has not been cooperating with taking the oral medication at all.  No known fever or chills.  He also reports right buttock ulcer which is getting worse for about 1 week.  No vomiting/diarrhea/bleeding/trauma.  Patient is unable to provide any history.      In ED   vitals: BP: 134/73, RA  s/p Rocephin, S/P: 1LNS  wbc 11.2, NA: 179      (08 Feb 2024 23:34)      INTERIM EVENTS:Patient seen at bedside ,interim events noted.      PMH -reviewed admission note, no change since admission  HEART FAILURE: Acute[ ]Chronic[ ] Systolic[ ] Diastolic[ ] Combined Systolic and Diastolic[ ]  CAD[ ] CABG[ ] PCI[ ]  DEVICES[ ] PPM[ ] ICD[ ] ILR[ ]  ATRIAL FIBRILLATION[ ] Paroxysmal[ ] Permanent[ ] CHADS2-[  ]  DEVIN[ ] CKD1[ ] CKD2[ ] CKD3[ ] CKD4[ ] ESRD[ ]  COPD[ ] HTN[ ]   DM[ ] Type1[ ] Type 2[ ]   CVA[ ] Paresis[ ]    AMBULATION: Assisted[ ] Cane/walker[ ] Independent[ ]    MEDICATIONS  (STANDING):  HYDROmorphone  Injectable 0.3 milliGRAM(s) IV Push every 6 hours    MEDICATIONS  (PRN):  acetaminophen  Suppository .. 650 milliGRAM(s) Rectal every 6 hours PRN Temp greater or equal to 38C (100.4F), Mild Pain (1 - 3)  bisacodyl Suppository 10 milliGRAM(s) Rectal daily PRN Constipation  glycopyrrolate Injectable 0.4 milliGRAM(s) IV Push every 6 hours PRN secretions  HYDROmorphone  Injectable 0.3 milliGRAM(s) IV Push every 2 hours PRN Moderate Pain (4 - 6)            REVIEW OF SYSTEMS:  Constitutional: [ ] fever, [ ]weight loss,  [ ]fatigue [ ]weight gain  Eyes: [ ] visual changes  Respiratory: [ ]shortness of breath;  [ ] cough, [ ]wheezing, [ ]chills, [ ]hemoptysis  Cardiovascular: [ ] chest pain, [ ]palpitations, [ ]dizziness,  [ ]leg swelling[ ]orthopnea[ ]PND  Gastrointestinal: [ ] abdominal pain, [ ]nausea, [ ]vomiting,  [ ]diarrhea [ ]Constipation [ ]Melena  Genitourinary: [ ] dysuria, [ ] hematuria [ ]Crook  Neurologic: [ ] headaches [ ] tremors[ ]weakness [ ]Paralysis Right[ ] Left[ ]  Skin: [ ] itching, [ ]burning, [ ] rashes  Endocrine: [ ] heat or cold intolerance  Musculoskeletal: [ ] joint pain or swelling; [ ] muscle, back, or extremity pain  Psychiatric: [ ] depression, [ ]anxiety, [ ]mood swings, or [ ]difficulty sleeping  Hematologic: [ ] easy bruising, [ ] bleeding gums    [ ] All remaining systems negative except as per above.   [ ]Unable to obtain.  [x] No change in ROS since admission      Vital Signs Last 24 Hrs  T(C): 36.8 (13 Feb 2024 20:07), Max: 37 (12 Feb 2024 23:43)  T(F): 98.2 (13 Feb 2024 20:07), Max: 98.6 (12 Feb 2024 23:43)  HR: 70 (13 Feb 2024 20:07) (65 - 75)  BP: 128/54 (13 Feb 2024 20:07) (98/50 - 128/54)  BP(mean): 68 (13 Feb 2024 11:11) (65 - 68)  RR: 17 (13 Feb 2024 20:07) (16 - 18)  SpO2: 95% (13 Feb 2024 20:07) (82% - 96%)    Parameters below as of 13 Feb 2024 20:07  Patient On (Oxygen Delivery Method): nasal cannula  O2 Flow (L/min): 5    I&O's Summary      PHYSICAL EXAM:  General: No acute distress BMI-  HEENT: EOMI, PERRL  Neck: Supple, [ ] JVD  Lungs: Equal air entry bilaterally; [ ] rales [ ] wheezing [ ] rhonchi  Heart: Regular rate and rhythm; [x ] murmur   2/6 [ x] systolic [ ] diastolic [ ] radiation[ ] rubs [ ]  gallops  Abdomen: Nontender, bowel sounds present  Extremities: No clubbing, cyanosis, [ ] edema [ ]Pulses  equal and intact  Nervous system:  Alert & Oriented X3, no focal deficits  Psychiatric: Normal affect  Skin: No rashes or lesions    LABS:        Creatinine Trend: 1.66<--, 1.69<--, 2.04<--, 2.09<--

## 2024-02-13 NOTE — PROGRESS NOTE ADULT - TIME BILLING
- Review of records, telemetry, vital signs and daily labs.   - General and cardiovascular physical examination.  - Generation of cardiovascular treatment plan.  - Coordination of care.      Patient was seen and examined by me on 2/12/24,interim events noted,labs and radiology studies reviewed.  Carlos Escobedo MD,FACC.  [address] [place], [place] [id number].  [phone]
- Review of records, telemetry, vital signs and daily labs.   - General and cardiovascular physical examination.  - Generation of cardiovascular treatment plan.  - Coordination of care.      Patient was seen and examined by me on 2/13/24,interim events noted,labs and radiology studies reviewed.  Carlos Escobedo MD,FACC.  [address] [place], [place] [id number].  [phone]

## 2024-02-13 NOTE — PROGRESS NOTE ADULT - ASSESSMENT
88F, with PMHx dementia (baseline AAOx1), presents to ED from assisted living for failure to thrive, with drowsiness for about 2 weeks, altered mental status, not eating or drinking anything for several days. Spoke to son Brenton who states he was informed that patient was minimally interactive for the past 3 weeks, not eating or drinking. Found to have Na 179, WBC 11k, and UA+. Admitted for hypernatremia, failure to thrive, and UTI. Now Comfort Care, MEWS exempt. As per family request, continuing IVF only. Family inquiring about hospice, will follow up with Palliative.

## 2024-02-13 NOTE — PROGRESS NOTE ADULT - SUBJECTIVE AND OBJECTIVE BOX
follow up on:  complex medical decision making related to goals of care    [facility] Geriatric and Palliative Consult Service:  Debra Araujo DO: cell ([phone])  Samy Daniels MD: cell ([phone])  Ronald Cates NP: cell ([phone])   El Anaya LMSW: cell ([phone])   Gillian Vaughan NP: via [employer] Teams    Can contact any Palliative Team member via [employer] Teams for consults and questions      OVERNIGHT EVENTS:  None.  Patient remains on ATC IV Dilaudid Q 6 hrs.    Patient examined this afternoon with son Brenton at bedside.  Remains alert and oriented x zero, minimally responsive.  Appears to be in less respiratory distress, no overt pain or SOB noted currently.  No other complaints reported by family or bedside nursing staff.    Present Symptoms: Mild, Moderate, Severe  Pain:  Unable to verbalize, CPOT 0      Review of Systems:  Unable to obtain due to poor mentation/lethargy/dementia    CPOT:  0  https://ccs-st.org/resources/Documents/[place]%20Analgesia%20Delirium%20in%20CC/CCS%20STH%20Guideline%20template%20CPOT.pdf  PAIN AD Score: 1  [email or web address]/painad.pdf (press ctrl +  left click to view)      MEDICATIONS  (STANDING):  HYDROmorphone  Injectable 0.3 milliGRAM(s) IV Push every 6 hours    MEDICATIONS  (PRN):  acetaminophen  Suppository .. 650 milliGRAM(s) Rectal every 6 hours PRN Temp greater or equal to 38C (100.4F), Mild Pain (1 - 3)  bisacodyl Suppository 10 milliGRAM(s) Rectal daily PRN Constipation  glycopyrrolate Injectable 0.4 milliGRAM(s) IV Push every 6 hours PRN secretions  HYDROmorphone  Injectable 0.3 milliGRAM(s) IV Push every 4 hours PRN Tachypnea (RR > 22) or labored breathing  HYDROmorphone  Injectable 0.3 milliGRAM(s) IV Push every 2 hours PRN Moderate Pain (4 - 6)      PHYSICAL EXAM:  Vital Signs Last 24 Hrs  T(C): 36.8 (13 Feb 2024 11:11), Max: 37 (12 Feb 2024 23:43)  T(F): 98.2 (13 Feb 2024 11:11), Max: 98.6 (12 Feb 2024 23:43)  HR: 75 (13 Feb 2024 11:11) (65 - 75)  BP: 104/53 (13 Feb 2024 11:11) (98/50 - 122/58)  BP(mean): 68 (13 Feb 2024 11:11) (65 - 68)  RR: 17 (13 Feb 2024 11:11) (17 - 18)  SpO2: 82% (13 Feb 2024 11:11) (82% - 96%)    Parameters below as of 13 Feb 2024 11:11  Patient On (Oxygen Delivery Method): nasal cannula  O2 Flow (L/min): 5      General: alert  oriented x __0__    lethargic  grossly cachectic with severe temporal/chest wall wasting, NAD    Palliative Performance Scale/Karnofsky Score:  10%  http://npcrc.org/files/news/palliative_performance_scale_ppsv2.pdf    HEENT:  anicteric, pharynx clear, MM minimally dry  Lungs:  overall clear to auscultation, respirations occ labored, minimal congestion noted  CV: RSR, normal S1 and S2, no murmur  GI: soft non distended non tender   + bowel sounds  : incontinent    Musculoskeletal: weakness x4  in all extremities, bedbound, no edema noted  Skin:  + Kevin terminal ulcer to L ear and bilateral heels, + Kevin terminal ulcer to sacrococcygeal area with epidermal separation, slough, and drainage, + poor skin turgor  + cold hands/UE bilaterally  Neuro: no  focal deficits, + severe cognitive impairment  + dysphagia  Oral intake ability: unable/mouth care only      LABS:    Albumin: 2.1 g/dL (02.08.24 @ 14:30)            RADIOLOGY & ADDITIONAL STUDIES:

## 2024-02-13 NOTE — PROGRESS NOTE ADULT - CONVERSATION DETAILS
Additional face to face family meeting held with son Brenton x 20 minutes to discuss prognosis, ACP, goals of care, and treatment preferences.  [facility] SW joined in the conversation, as did other son Bashir by phone.  Reviewed changes/events of last 24 hours, including that patient developed increased labored breathing requiring discontinuation of IV fluids and initiation of IV Dilaudid with ATC dosing.  Discussed that patient's BP is now declining, and that she is starting to become hypoxic intermittently despite O2 supplementation.  Reinforced that patient is actively dying, now with likely prognosis of days; discussed that given her terminal respiratory failure and new dependence on standing IV opioids, it likely would be difficult to get patient safely home to her assisted living facility for a comfortable death, and that patient's symptoms would be best managed in an inpatient/IPU setting.  After further discussion, both sons voiced agreement with IPU referral, various options for IPU/inpatient hospice discussed.  Family reiterated current plan to continue comfort measures only, with no further IV fluids, antibiotics, or any other meds/interventions not required for comfort.   Sons were appreciative of the conversation, and all of their questions were answered.
Additional face to face family meeting held with yohana Farris at bedside x 16 minutes (as separately identifiable service) to discuss prognosis, ACP, goals of care, and treatment preferences.  Patient has remained relatively comfortable over the weekend, with no recent PRN medication use.  Son now states they wish to bring the patient back to [facility] to die in the peaceful surroundings of her home ([facility]  and [facility] already on board).  Advised son we will do our best to get the patient home, that her vitals signs are currently stable for transfer and that her symptoms are presently managed, but also warned him that patient may decline at any time, which would necessitate her being managed in an inpatient setting for EOL.  IPU criteria explained, son counseled that patient does not meet IPU criteria at this time.  Briefly discussed possible inpatient hospice options in case of further decline, son does not want to consider possible transfer to [place] (too far  for the family).  Also reviewed risks/benefits/burdens of IV hydration, reinforced to son that continued IV hydration is likely doing little at this point except to prolong the dying process, that IV hydration will not be continued in the home/[facility] setting, and that patient's appetite and thirst are shutting down naturally as part of the dying process.  Son voiced understanding of the above, will also discuss with his other siblings.  For now, son in agreement to continue gentle IV hydration in hopes of keeping patient stable for discharge to [facility] with home hospice (hopefully within next 24-48 hours).  Emotional support provided. Son was appreciative of the conversation, and all of his questions were answered.

## 2024-02-13 NOTE — PROGRESS NOTE ADULT - ATTENDING COMMENTS
discussed management plan with house staff
discussed management plan with house staff
discussed management plan with house staff  renal f/u , monitor sodium closely
discussed management plan with house staff
discussed management plan with house staff

## 2024-02-13 NOTE — PROGRESS NOTE ADULT - SUBJECTIVE AND OBJECTIVE BOX
PGY-1 Progress Note discussed with attending.    PAGER #: [[phone]] TILL 5:00 PM  PLEASE CONTACT ON CALL TEAM:  - On Call Team (Please refer to Lisa) FROM 5:00 PM - 8:30PM  - Nightfloat Team FROM 8:30 -7:30 AM      INTERVAL HPI/OVERNIGHT EVENTS: Patient seen and examined at bedside. Resting comfortably. No acute overnight events.      REVIEW OF SYSTEMS:  +UNABLE TO OBTAIN DUE TO PATIENT'S MENTAL STATUS    Vital Signs Last 24 Hrs  T(C): 36.6 (13 Feb 2024 08:07), Max: 37 (12 Feb 2024 11:53)  T(F): 97.9 (13 Feb 2024 08:07), Max: 98.6 (12 Feb 2024 11:53)  HR: 67 (13 Feb 2024 08:07) (65 - 69)  BP: 98/50 (13 Feb 2024 08:07) (98/50 - 127/56)  BP(mean): 65 (13 Feb 2024 08:07) (65 - 65)  RR: 18 (13 Feb 2024 08:07) (17 - 18)  SpO2: 94% (13 Feb 2024 08:07) (90% - 96%)    Parameters below as of 13 Feb 2024 08:07  Patient On (Oxygen Delivery Method): nasal cannula  O2 Flow (L/min): 3      PHYSICAL EXAMINATION:  GENERAL: somnolent   HEAD:  Atraumatic, Normocephalic.  EYES:  Conjunctiva and sclera clear.  NECK: Supple, No JVD, Normal thyroid.  CHEST/LUNG: Clear to auscultation. Clear to percussion bilaterally. No rales, rhonchi, wheezing, or rubs.  HEART: Regular rate and rhythm. No murmurs, rubs, or gallops.  ABDOMEN: Soft, Nontender, Nondistended. Bowel sounds present.  NERVOUS SYSTEM:  Alert & Oriented X0, responsive only to painful stimuli.  EXTREMITIES:  2+ Peripheral Pulses. No clubbing, cyanosis, or edema.  SKIN: Warm, dry.                      CAPILLARY BLOOD GLUCOSE:      RADIOLOGY & ADDITIONAL TESTS:

## 2024-02-14 ENCOUNTER — TRANSCRIPTION ENCOUNTER (OUTPATIENT)
Age: 89
End: 2024-02-14

## 2024-02-14 VITALS
DIASTOLIC BLOOD PRESSURE: 48 MMHG | OXYGEN SATURATION: 95 % | SYSTOLIC BLOOD PRESSURE: 113 MMHG | RESPIRATION RATE: 16 BRPM | HEART RATE: 70 BPM | TEMPERATURE: 98 F

## 2024-02-14 PROCEDURE — 87077 CULTURE AEROBIC IDENTIFY: CPT

## 2024-02-14 PROCEDURE — 80053 COMPREHEN METABOLIC PANEL: CPT

## 2024-02-14 PROCEDURE — 99285 EMERGENCY DEPT VISIT HI MDM: CPT | Mod: 25

## 2024-02-14 PROCEDURE — 85025 COMPLETE CBC W/AUTO DIFF WBC: CPT

## 2024-02-14 PROCEDURE — 83735 ASSAY OF MAGNESIUM: CPT

## 2024-02-14 PROCEDURE — 80048 BASIC METABOLIC PNL TOTAL CA: CPT

## 2024-02-14 PROCEDURE — 93005 ELECTROCARDIOGRAM TRACING: CPT

## 2024-02-14 PROCEDURE — 87186 SC STD MICRODIL/AGAR DIL: CPT

## 2024-02-14 PROCEDURE — 87086 URINE CULTURE/COLONY COUNT: CPT

## 2024-02-14 PROCEDURE — 84100 ASSAY OF PHOSPHORUS: CPT

## 2024-02-14 PROCEDURE — 36415 COLL VENOUS BLD VENIPUNCTURE: CPT

## 2024-02-14 PROCEDURE — 99233 SBSQ HOSP IP/OBS HIGH 50: CPT

## 2024-02-14 PROCEDURE — 81001 URINALYSIS AUTO W/SCOPE: CPT

## 2024-02-14 RX ORDER — ACETAMINOPHEN 500 MG
1 TABLET ORAL
Qty: 0 | Refills: 0 | DISCHARGE
Start: 2024-02-14

## 2024-02-14 RX ORDER — ROBINUL 0.2 MG/ML
0.4 INJECTION INTRAMUSCULAR; INTRAVENOUS
Qty: 0 | Refills: 0 | DISCHARGE
Start: 2024-02-14

## 2024-02-14 RX ORDER — HYDROMORPHONE HYDROCHLORIDE 2 MG/ML
0.5 INJECTION INTRAMUSCULAR; INTRAVENOUS; SUBCUTANEOUS EVERY 4 HOURS
Refills: 0 | Status: DISCONTINUED | OUTPATIENT
Start: 2024-02-14 | End: 2024-02-14

## 2024-02-14 RX ORDER — HYDROMORPHONE HYDROCHLORIDE 2 MG/ML
0.3 INJECTION INTRAMUSCULAR; INTRAVENOUS; SUBCUTANEOUS
Qty: 0 | Refills: 0 | DISCHARGE
Start: 2024-02-14

## 2024-02-14 RX ORDER — SENNA PLUS 8.6 MG/1
1 TABLET ORAL
Refills: 0 | DISCHARGE

## 2024-02-14 RX ORDER — HYDROMORPHONE HYDROCHLORIDE 2 MG/ML
0.5 INJECTION INTRAMUSCULAR; INTRAVENOUS; SUBCUTANEOUS
Refills: 0 | Status: DISCONTINUED | OUTPATIENT
Start: 2024-02-14 | End: 2024-02-14

## 2024-02-14 RX ORDER — OMEPRAZOLE 10 MG/1
1 CAPSULE, DELAYED RELEASE ORAL
Refills: 0 | DISCHARGE

## 2024-02-14 RX ORDER — MEMANTINE HYDROCHLORIDE 10 MG/1
1 TABLET ORAL
Refills: 0 | DISCHARGE

## 2024-02-14 RX ADMIN — HYDROMORPHONE HYDROCHLORIDE 0.3 MILLIGRAM(S): 2 INJECTION INTRAMUSCULAR; INTRAVENOUS; SUBCUTANEOUS at 00:01

## 2024-02-14 RX ADMIN — HYDROMORPHONE HYDROCHLORIDE 0.3 MILLIGRAM(S): 2 INJECTION INTRAMUSCULAR; INTRAVENOUS; SUBCUTANEOUS at 06:08

## 2024-02-14 RX ADMIN — HYDROMORPHONE HYDROCHLORIDE 0.3 MILLIGRAM(S): 2 INJECTION INTRAMUSCULAR; INTRAVENOUS; SUBCUTANEOUS at 00:50

## 2024-02-14 RX ADMIN — HYDROMORPHONE HYDROCHLORIDE 0.3 MILLIGRAM(S): 2 INJECTION INTRAMUSCULAR; INTRAVENOUS; SUBCUTANEOUS at 07:08

## 2024-02-14 NOTE — PROGRESS NOTE ADULT - PROBLEM SELECTOR PLAN 5
UA+  - s/p ceftriaxone (2/9).  - f/u UCx.  - ALL ABX DISCONTINUED. COMFORT CARE, MEWS EXEMPT. No lab draws.
UA+  - s/p ceftriaxone (2/9).  - f/u UCx.  - ALL ABX DISCONTINUED. COMFORT CARE, MEWS EXEMPT. No lab draws.
Unstageable DU of sacrum/coccyx  Wound care consult appreciated--mayte Brown terminal ulcers of heels, L ear, and sacrum/coccyx    Continue present wound care orders (silver calcium alginate recommended)  Now on IV Dilaudid 0.3 mg Q 6 hrs ATC with 0.3 mg Q2 hrs PRN breakthrough pain/dyspnea    Recommend:  Offloading  Turning and repositioning Q 2 hours as tolerated.
UA+  - s/p ceftriaxone (2/9).  - f/u UCx.  - ALL ABX DISCONTINUED. COMFORT CARE, MEWS EXEMPT. No lab draws.
Unstageable DU of sacrum/coccyx  Wound care consult appreciated--mayte Brown terminal ulcers of heels, L ear, and sacrum/coccyx    Continue present wound care orders (silver calcium alginate recommended)  Will increase standing and PRN Dilaudid for pain management as above    Recommend:  Offloading  Turning and repositioning Q 2 hours as tolerated.
UA+  - s/p ceftriaxone (2/9).  - f/u UCx.  - ALL ABX DISCONTINUED. COMFORT CARE, MEWS EXEMPT. No lab draws.
UA+  - s/p ceftriaxone (2/9).  - f/u UCx.  - ALL ABX DISCONTINUED. COMFORT CARE, MEWS EXEMPT. No lab draws.
Unstageable DU of sacrum/coccyx  Wound care consult appreciated--has Kevin terminal ulcers of heels, L ear, and sacrum/coccyx    Continue present wound care orders (silver calcium alginate recommended)  IV Dilaudid 0.3 mg Q 4 hrs PRN pain/SOB as above--may be converted to oral Roxanol if patient goes home  Offloading  Turning and repositioning Q 2 hours as tolerated.

## 2024-02-14 NOTE — PROGRESS NOTE ADULT - PROBLEM SELECTOR PROBLEM 4
Pressure ulcer, buttock
Terminal respiratory secretions
Terminal respiratory secretions
Pressure ulcer, buttock
Terminal respiratory secretions

## 2024-02-14 NOTE — PROGRESS NOTE ADULT - SUBJECTIVE AND OBJECTIVE BOX
follow up on:  complex medical decision making related to goals of care    [facility] Geriatric and Palliative Consult Service:  Debra Araujo DO: cell ([phone])  Samy Daniels MD: cell ([phone])  Ronald Cates NP: cell ([phone])   El Anaya LMSW: cell ([phone])   Gillian Vaughan NP: via [employer]    Can contact any Palliative Team member via [employer] for consults and questions      OVERNIGHT EVENTS:  None.  No PRN meds given within last 24 hours    Patient examined this morning with son and daughter at bedside.  Remains alert and oriented x zero, minimally responsive.  ++ increased moaning/grimacing/motor restlessness noted, appears more uncomfortable and in more pain compared to yesterday.  No overt signs of SOB.  No other acute complaints reported.      Present Symptoms: Mild, Moderate, Severe  Pain:  Unable to verbalize, CPOT 4                       Review of Systems:   Unable to obtain due to poor mentation/lethargy/dementia    CPOT:  4  https://ccs-sth.org/resources/Documents/[place]%20Analgesia%20Delirium%20in%20CC/CCS%20STH%20Guideline%20template%20CPOT.pdf  PAIN AD Score:  5  [email or web address]/[email or web address].pdf (press ctrl +  left click to view)    MEDICATIONS  (STANDING):  HYDROmorphone  Injectable 0.5 milliGRAM(s) IV Push every 4 hours    MEDICATIONS  (PRN):  acetaminophen  Suppository .. 650 milliGRAM(s) Rectal every 6 hours PRN Temp greater or equal to 38C (100.4F), Mild Pain (1 - 3)  bisacodyl Suppository 10 milliGRAM(s) Rectal daily PRN Constipation  glycopyrrolate Injectable 0.4 milliGRAM(s) IV Push every 6 hours PRN secretions  HYDROmorphone  Injectable 0.5 milliGRAM(s) IV Push every 2 hours PRN Moderate Pain (4 - 6)  HYDROmorphone  Injectable 0.5 milliGRAM(s) IV Push every 2 hours PRN Tachypnea (RR > 22) or labored breathing      PHYSICAL EXAM:  Vital Signs Last 24 Hrs  T(C): 36.9 (14 Feb 2024 08:04), Max: 36.9 (14 Feb 2024 08:04)  T(F): 98.4 (14 Feb 2024 08:04), Max: 98.4 (14 Feb 2024 08:04)  HR: 58 (14 Feb 2024 08:04) (58 - 75)  BP: 99/49 (14 Feb 2024 08:04) (99/49 - 128/54)  BP(mean): 68 (13 Feb 2024 11:11) (68 - 68)  RR: 17 (14 Feb 2024 08:04) (16 - 18)  SpO2: 99% (14 Feb 2024 08:04) (82% - 99%)    Parameters below as of 14 Feb 2024 08:04  Patient On (Oxygen Delivery Method): nasal cannula  O2 Flow (L/min): 5      General: alert  oriented x _0__    lethargic + cachectic with severe temporal wasting, in obvious discomfort 2/2 pain    Palliative Performance Scale/Karnofsky Score:  10%  http://npcrc.org/files/news/palliative_performance_scale_ppsv2.pdf    HEENT:  anicteric, pharynx clear, MM minimally dry  Lungs:  overall clear to auscultation, respirations occ labored, minimal congestion noted  CV: RSR, normal S1 and S2, no murmur  GI: soft non distended non tender   + bowel sounds  : incontinent    Musculoskeletal: weakness x4  in all extremities, bedbound, no edema noted  Skin:  + Kevin terminal ulcer to L ear and bilateral heels, + Kevin terminal ulcer to sacrococcygeal area with epidermal separation, slough, and drainage, + poor skin turgor  + cold hands/UE bilaterally  Neuro: no  focal deficits, + severe cognitive impairment  + dysphagia  Oral intake ability: unable/mouth care only      LABS:    Albumin: 2.1 g/dL (02.08.24 @ 14:30)        RADIOLOGY & ADDITIONAL STUDIES:

## 2024-02-14 NOTE — PROGRESS NOTE ADULT - PROBLEM SELECTOR PLAN 4
Continue IV glycopyrrolate 0.4 mg Q 6 hrs PRN.
un-stageable pressure ulcer.  - wound care consult.
Continue IV glycopyrrolate 0.4 mg Q 6 hrs PRN.
un-stageable pressure ulcer.  - wound care consult.
un-stageable pressure ulcer.  - wound care consult.
Continue IV glycopyrrolate 0.4 mg Q 6 hrs PRN.
un-stageable pressure ulcer.  - wound care consult.

## 2024-02-14 NOTE — PROGRESS NOTE ADULT - NUTRITIONAL ASSESSMENT
This patient has been assessed with a concern for Malnutrition and has been determined to have a diagnosis/diagnoses of Severe protein-calorie malnutrition and Underweight (BMI < 19).    This patient is being managed with:   Diet NPO-  Entered: Feb 9 2024  2:40AM  

## 2024-02-14 NOTE — PROGRESS NOTE ADULT - PROBLEM SELECTOR PROBLEM 3
FTT (failure to thrive) in adult
Shortness of breath
FTT (failure to thrive) in adult
Shortness of breath
Shortness of breath

## 2024-02-14 NOTE — PROGRESS NOTE ADULT - PROBLEM SELECTOR PROBLEM 2
DEVIN (acute kidney injury)
Hypernatremia
Hypernatremia
DEVIN (acute kidney injury)
Hypernatremia
DEVIN (acute kidney injury)

## 2024-02-14 NOTE — DISCHARGE NOTE NURSING/CASE MANAGEMENT/SOCIAL WORK - PATIENT PORTAL LINK FT
You can access the FollowMyHealth Patient Portal offered by [facility] by registering at the following website: [email or web address]. By joining [employer]’s FollowMyHealth portal, you will also be able to view your health information using other applications (apps) compatible with our system.

## 2024-02-14 NOTE — PROGRESS NOTE ADULT - PROBLEM SELECTOR PLAN 6
-COMFORT CARE, MEWS EXEMPT
Clinical evidence indicates that the patient has Severe protein calorie malnutrition/ 3rd degree    In context of   Chronic Illness (>1 month)--end stage dementia, as evidenced by    Energy/Food intake <50% of estimated energy requirement >5 days  Weight loss: Moderate - severe (lbs lost recently)  Body Fat loss: Severe   grossly cachectic with severe temporal and chest wall wasting, + muscle atrophy)  Muscle mass loss: Severe  (++ Kevin ulcer DU of coccyx/sacrum, albumin 2.1)   Strength: weakened severe (bedbound)    Recommend:   Patient actively dying, currently NPO - maintain strict aspiration precautions, careful hand-feeding, teaching to caregivers  Can consider pleasure feeds as tolerated if mentation improves (unlikely)    No feeding tube per family (living will on chart).
Clinical evidence indicates that the patient has Severe protein calorie malnutrition/ 3rd degree    In context of   Chronic Illness (>1 month)--end stage dementia, as evidenced by    Energy/Food intake <50% of estimated energy requirement >5 days  Weight loss: Moderate - severe (lbs lost recently)  Body Fat loss: Severe   grossly cachectic with severe temporal and chest wall wasting, + muscle atrophy)  Muscle mass loss: Severe  (++ Kevin ulcer DU of coccyx/sacrum, albumin 2.1)   Strength: weakened severe (bedbound)    Recommend:   Patient actively dying, currently NPO - maintain strict aspiration precautions, careful hand-feeding, teaching to caregivers  Can consider pleasure feeds as tolerated if mentation improves (unlikely)    No feeding tube per family (living will on chart).
- heparin SQ.
-COMFORT CARE, MEWS EXEMPT
Clinical evidence indicates that the patient has Severe protein calorie malnutrition/ 3rd degree    In context of   Chronic Illness (>1 month)--end stage dementia, as evidenced by    Energy/Food intake <50% of estimated energy requirement >5 days  Weight loss: Moderate - severe (lbs lost recently)  Body Fat loss: Severe   grossly cachectic with severe temporal and chest wall wasting, + muscle atrophy)  Muscle mass loss: Severe  (++ Kevin ulcer DU of coccyx/sacrum, albumin 2.1)   Strength: weakened severe (bedbound)    Recommend:   Patient actively dying, currently NPO - maintain strict aspiration precautions, careful hand-feeding, teaching to caregivers  Can consider pleasure feeds as tolerated if mentation improves (unlikely)    No feeding tube per family (living will on chart).

## 2024-02-14 NOTE — PROGRESS NOTE ADULT - PROBLEM SELECTOR PROBLEM 1
Hypernatremia
Hypernatremia
Advanced dementia
Hypernatremia
Advanced dementia
Advanced dementia
Hypernatremia
Hypernatremia

## 2024-02-14 NOTE — PROGRESS NOTE ADULT - REASON FOR ADMISSION
FTT, hypernatremia, UTI

## 2024-02-14 NOTE — PROGRESS NOTE ADULT - PROVIDER SPECIALTY LIST ADULT
Internal Medicine
Nephrology
Cardiology
Internal Medicine
Palliative Care
Internal Medicine
Cardiology
Palliative Care
Internal Medicine
Palliative Care

## 2024-02-14 NOTE — PROGRESS NOTE ADULT - PROBLEM SELECTOR PLAN 2
SCr 2.09 on admission  Baseline SCr -normal   - pre-renal 2/2 decreased PO intake   - D5 IVF for now as hypernatremic.   - COMFORT CARE, MEWS EXEMPT. No lab draws.
Likely due to free water deficit in setting of advanced dementia, poor prognostic sign  Patient is now comfort measures only, no further blood draws  Developed labored breathing, now in terminal respiratory failure as of 2/12, IV fluids discontinued
SCr 2.09 on admission  Baseline SCr -normal   - pre-renal 2/2 decreased PO intake   - D5 IVF for now as hypernatremic.   - COMFORT CARE, MEWS EXEMPT. No lab draws.
SCr 2.09 on admission  Baseline SCr -normal   - pre-renal 2/2 decreased PO intake   - D5 IVF for now as hypernatremic.   - COMFORT CARE, MEWS EXEMPT. No lab draws.
Likely due to free water deficit in setting of advanced dementia, poor prognostic sign  Patient is now comfort measures only  Will decrease D5W to 30 ml/hr, risks/benefits of continued IV hydration discussed with family.  They are aware IV fluids will need to be discontinued if symptoms of respiratory congestion or increased fluid retention develop.    No further blood draws.
SCr 2.09 on admission  Baseline SCr -normal   - pre-renal 2/2 decreased PO intake   - D5 IVF for now as hypernatremic.   - COMFORT CARE, MEWS EXEMPT. No lab draws.
Likely due to free water deficit in setting of advanced dementia, poor prognostic sign  Patient is now comfort measures only, no further blood draws  Developed labored breathing, now in terminal respiratory failure as of 2/12, IV fluids discontinued

## 2024-02-14 NOTE — PROGRESS NOTE ADULT - PROBLEM SELECTOR PLAN 7
As above.  89 yo female with end stage dementia, rapid functional decline, FAST 7F, PPS 10%,  hospice appropriate with likely prognosis of days.  Now in terminal respiratory failure over last 24 hours, IPU/inpatient hospice appropriate.  IPU level of care indicated for management of terminal respiratory failure and generalized pain likely to require titration of IV opioids.  Patient is unable to be cared for in any other setting.    See GOC discussion from 2/9 and above, family in agreement with DNR, DNI, and transition to comfort measures only.  IV antibiotics/IV fluids discontinued.  Family now in agreement with IPU/inpatient hospice, exploring options.      Remainder of management as per primary medical team  IV Dilaudid to be titrated to 0.5 mg Q 4 hrs ATC and 0.5 mg Q2 PRN as above, likely to require further dose titration  IV glycopyrrolate for secretions as above  CMO/MEWS exempt/no further blood draws  Orders for DNR and DNI in place  Discussed with medical team and Dr. Leon in detail, ongoing collaboration with Pall Care team ANDREZ and ALEXIS clinicians for possible IPU transfer  EOL education and counseling given to family/caregivers at bedside  Palliative Care team will continue to follow.
As above.  87 yo female with end stage dementia, rapid functional decline, FAST 7F, PPS 10%,  hospice appropriate with likely prognosis of days to < 1 week.  Remains low threshold for IPU (technically does not meet IPU criteria at this time).    See GOC discussion from 2/9 and above, family in agreement with DNR, DNI, and transition to comfort measures only.  IV antibiotics discontinued.  They now wish for patient to return to [facility] for end of life care (son states that family will stay with her at night)    Remainder of management as per primary medical team  PRN IV Dilaudid and glycopyrrolate for symptom management as above  CMO/MEWS exempt/no further blood draws  Family wishes to continue IV fluids for now, D5W decreased to 30 ml/hr  Orders for DNR and DNI in place  Discussed with medical team and Dr. Leon in detail, will collaborate with floor SW/case management   EOL education and counseling given to family/caregivers at bedside  Pallaitive Care team will continue to follow.
As above.  87 yo female with end stage dementia, rapid functional decline, FAST 7F, PPS 10%,  hospice appropriate with likely prognosis of days.  Now in terminal respiratory failure over last 24 hours, IPU/inpatient hospice appropriate.  IPU level of care indicated for management of terminal respiratory failure likely to require titration of IV opioids (already on standing IV Dilaudid Q 6).  Patient is unable to be cared for in any other setting.    See GOC discussion from 2/9 and above, family in agreement with DNR, DNI, and transition to comfort measures only.  IV antibiotics/IV fluids discontinued.  Family now in agreement with IPU/inpatient hospice, exploring options.      Remainder of management as per primary medical team  IV Dilaudid Q 6 hrs ATC/PRN and glycopyrrolate for symptom management as above  CMO/MEWS exempt/no further blood draws  Family wishes to continue IV fluids for now  Orders for DNR and DNI in place  Discussed with medical team and Dr. Leon in detail  EOL education and counseling given to family/caregivers at bedside  Palliative Care team will continue to follow.

## 2024-02-14 NOTE — PROGRESS NOTE ADULT - PROBLEM SELECTOR PROBLEM 6
Severe protein-calorie malnutrition
Prophylactic measure
Severe protein-calorie malnutrition
Prophylactic measure
Severe protein-calorie malnutrition

## 2024-02-14 NOTE — PROGRESS NOTE ADULT - ASSESSMENT
88-year-old woman, with PMHx of advanced dementia, presented to [facility] ED yesterday 2/8 from [facility] with failure to thrive, with drowsiness for about 2 weeks, altered mental status, not eating or drinking anything for several days.   Found to have Na of 179 in ER.  Patient admitted for severe hypernatremia, DEVIN, FTT, and presumed UTI in the setting of advanced dementia.  Started on IV Rocephin and gentle IV hydration with D5W.  Palliative Care consultation requested for medical decision making and additional GOC discussions.    Patient now appears to be actively dying, family has agreed to DNR, DNI, and comfort measures only.  Now agreeable to inpatient hospice/IPU setting for EOL care.

## 2024-02-14 NOTE — PROGRESS NOTE ADULT - PROBLEM/PLAN-1
DISPLAY PLAN FREE TEXT
never

## 2024-02-14 NOTE — PROGRESS NOTE ADULT - SUBJECTIVE AND OBJECTIVE BOX
PGY-1 Progress Note discussed with attending.    PAGER #: [[phone]] TILL 5:00 PM  PLEASE CONTACT ON CALL TEAM:  - On Call Team (Please refer to Lisa) FROM 5:00 PM - 8:30PM  - Nightfloat Team FROM 8:30 -7:30 AM      INTERVAL HPI/OVERNIGHT EVENTS: Patient seen and examined at bedside. Resting comfortably. No acute overnight events.      REVIEW OF SYSTEMS:  +UNABLE TO OBTAIN DUE TO PATIENT'S MENTAL STATUS    Vital Signs Last 24 Hrs  T(C): 36.9 (14 Feb 2024 08:04), Max: 36.9 (14 Feb 2024 08:04)  T(F): 98.4 (14 Feb 2024 08:04), Max: 98.4 (14 Feb 2024 08:04)  HR: 58 (14 Feb 2024 08:04) (58 - 75)  BP: 99/49 (14 Feb 2024 08:04) (99/49 - 128/54)  BP(mean): 68 (13 Feb 2024 11:11) (68 - 68)  RR: 17 (14 Feb 2024 08:04) (16 - 18)  SpO2: 99% (14 Feb 2024 08:04) (82% - 99%)    Parameters below as of 14 Feb 2024 08:04  Patient On (Oxygen Delivery Method): nasal cannula  O2 Flow (L/min): 5      PHYSICAL EXAMINATION:  GENERAL: somnolent   HEAD:  Atraumatic, Normocephalic.  EYES:  Conjunctiva and sclera clear.  NECK: Supple, No JVD, Normal thyroid.  CHEST/LUNG: Clear to auscultation. Clear to percussion bilaterally. No rales, rhonchi, wheezing, or rubs.  HEART: Regular rate and rhythm. No murmurs, rubs, or gallops.  ABDOMEN: Soft, Nontender, Nondistended. Bowel sounds present.  NERVOUS SYSTEM:  Alert & Oriented X0, responsive only to painful stimuli.  EXTREMITIES:  2+ Peripheral Pulses. No clubbing, cyanosis, or edema.  SKIN: Warm, dry.                      CAPILLARY BLOOD GLUCOSE:      RADIOLOGY & ADDITIONAL TESTS:

## 2024-02-14 NOTE — PROGRESS NOTE ADULT - PROBLEM SELECTOR PLAN 3
advanced dementia  decreased po intake  - Palliative care Dr Daniels following.   - COMFORT CARE, MEWS EXEMPT. No lab draws.
Continue IV Dilaudid 0.3 mg Q 4 hrs PRN, titrate to effect--has only required 1 dose since 2/9  Dulcolax supp PRN added for bowel regimen.
advanced dementia  decreased po intake  - Palliative care Dr Daniels following.   - COMFORT CARE, MEWS EXEMPT. No lab draws.
Now in terminal respiratory failure, in the setting of end stage dementia with severe hypernatremia, and multiple Kevin terminal uclers, actively dying, likely prognosis of days    Increase IV Dilaudid to 0.5 mg Q 4 hrs ATC and 0.5 mg Q2 PRN pain/dyspnea  Likely to require further titration of IV opioids  Dulcolax supp PRN in place for bowel regimen
Now in terminal respiratory failure, in the setting of end stage dementia with severe hypernatremia, and multiple Kevin terminal uclers, actively dying, likely prognosis of days    Continue IV Dilaudid 0.3 mg Q 6 hrs ATC with 0.3 mg Q2 hours PRN pain/dyspnea  Likely to require further titration of IV opioids  Dulcolax supp PRN in place for bowel regimen

## 2024-02-14 NOTE — PROGRESS NOTE ADULT - PROBLEM SELECTOR PLAN 1
Patient with rapid functional/clinical decline over last 4 weeks, now bedbound, minimally verbal, no longer eating/swallowing, total care in ADLs.  Patient is equivalent to FAST stage 7F with a PPS score of 10%.  She is hospice appropriate with likely prognosis of days to < 1 week.      2/13--Now in terminal respiratory failure, requiring standing IV opioids for relief of dyspnea, now IPU appropriate with likely prognosis of days, family in agreement, exploring options    2/14--appears to be in increased pain/discomfort today, CPOT 4/PAIN-AD 5 (both increased from previous)  Will change IV Dilaudid to 0.5 mg Q 4 hrs standing, with 0.5 mg Q 2 hrs PRN  Likely to require ongoing titration of IV opioids for symptom relief
p/w Na 179   s/p 1LNS in ED  - c/w D5 IVF   - COMFORT CARE, MEWS EXEMPT >> will be continuing fluids only. No lab draws.
Patient with rapid functional/clinical decline over last 4 weeks, now bedbound, minimally verbal, no longer eating/swallowing, total care in ADLs.  Patient is equivalent to FAST stage 7F with a PPS score of 10%.  She is hospice appropriate with likely prognosis of days to < 1 week.  Has remained stable over last 48 hours, family now wishes for patient to go back to care home, remains low threshold for IPU/inpatient hospice    See GOC discussion from 2/9 and above--family in agreement with DNR, DNI, and transition to comfort measures only  No further antibiotics, they wish to continue low rate IV fluids at this time  Primary team aware, continue supportive care.
Patient with rapid functional/clinical decline over last 4 weeks, now bedbound, minimally verbal, no longer eating/swallowing, total care in ADLs.  Patient is equivalent to FAST stage 7F with a PPS score of 10%.  She is hospice appropriate with likely prognosis of days to < 1 week.      2/13--Now in terminal respiratory failure, requiring standing IV opioids for relief of dyspnea, now IPU appropriate with likely prognosis of days    See GOC discussion from 2/9 and above--family in agreement with DNR, DNI, and transition to comfort measures only  No further antibiotics, IV fluids discontinued  Continue supportive care.
p/w Na 179   s/p 1LNS in ED  - c/w D5 IVF   - COMFORT CARE, MEWS EXEMPT >> will be continuing fluids only. No lab draws.

## 2024-02-14 NOTE — PROGRESS NOTE ADULT - PROBLEM SELECTOR PROBLEM 5
Acute UTI
Pressure ulcer, buttock
Acute UTI
Pressure ulcer, buttock
Acute UTI
Pressure ulcer, buttock
Acute UTI

## 2024-12-18 NOTE — ED PROVIDER NOTE - BIRTH SEX
Discussed locking up/removing dangerous items from home, including but not limited to weapons, knives, prescription and non prescription medications etc.  Female

## 2025-04-18 NOTE — H&P ADULT - ASSESSMENT
Patient is 83 year old female has past medical hx significant for celiac disease was brought in by daughter after pt was shaking last night.     Per daughter pt was in her usual health until last night when she woke up at midnight pt started to have shaking shivering pt was brought in to ED pt was code sepsis for fever of 103, pt had CT angio chest which was neg for PE, pt was given Rocephin and Zithromax for concern for pneumonia.   pt is very poor historian complaining of dry cough for a week, epigastric pain for several days, severe pain, non radiating, improves with rubbing, unable to provide much information, per daughter they were told pt has acid reflux, pt is having dizziness and upper back pain. Pt denies headache, sob, palpitation, abdominal pain, n/v/d, hematuria, dysuria hemoptysis, rash. Home